# Patient Record
Sex: MALE | URBAN - METROPOLITAN AREA
[De-identification: names, ages, dates, MRNs, and addresses within clinical notes are randomized per-mention and may not be internally consistent; named-entity substitution may affect disease eponyms.]

---

## 2024-06-26 ENCOUNTER — TELEPHONE (OUTPATIENT)
Dept: HEMATOLOGY/ONCOLOGY | Facility: CLINIC | Age: 69
End: 2024-06-26

## 2024-06-26 NOTE — TELEPHONE ENCOUNTER
Received outside referral on 6/25, faxed on 6/21. Nurse contacted pt on 6/26, explained role in scheduling new pt appt with next available provider. Offered the first next available. Pt voiced having upcoming appts for scans and chemo. Nurse voiced understanding. Scheduled for 7/8 1030 with Darwin @ . Provided appt date, time, location, provider, and address. Informed pt to bring ID and insurance info. Pt verbalized understanding and confirmed appt. All questions and concerns addressed. Nurse provided clinic number if pt has any questions, concerns or needs rescheduling. Pt will need assistance setting up [facility].

## 2024-07-08 ENCOUNTER — OFFICE VISIT (OUTPATIENT)
Dept: HEMATOLOGY/ONCOLOGY | Facility: CLINIC | Age: 69
End: 2024-07-08
Payer: MEDICARE

## 2024-07-08 VITALS
HEART RATE: 103 BPM | BODY MASS INDEX: 25.02 KG/M2 | WEIGHT: 184.75 LBS | RESPIRATION RATE: 20 BRPM | HEIGHT: 72 IN | TEMPERATURE: 99 F | SYSTOLIC BLOOD PRESSURE: 128 MMHG | OXYGEN SATURATION: 97 % | DIASTOLIC BLOOD PRESSURE: 82 MMHG

## 2024-07-08 DIAGNOSIS — C79.51 SECONDARY MALIGNANT NEOPLASM OF BONE: ICD-10-CM

## 2024-07-08 DIAGNOSIS — C34.32 ADENOCARCINOMA OF LOWER LOBE OF LEFT LUNG: Primary | ICD-10-CM

## 2024-07-08 DIAGNOSIS — M34.9 SCLERODERMA: ICD-10-CM

## 2024-07-08 DIAGNOSIS — C77.1 SECONDARY MALIGNANT NEOPLASM OF INTRATHORACIC LYMPH NODES: ICD-10-CM

## 2024-07-08 PROCEDURE — 99205 OFFICE O/P NEW HI 60 MIN: CPT | Mod: S$PBB,,, | Performed by: HOSPITALIST

## 2024-07-08 PROCEDURE — 99999 PR PBB SHADOW E&M-EST. PATIENT-LVL IV: CPT | Mod: PBBFAC,,, | Performed by: HOSPITALIST

## 2024-07-08 PROCEDURE — 99214 OFFICE O/P EST MOD 30 MIN: CPT | Mod: PBBFAC | Performed by: HOSPITALIST

## 2024-07-08 RX ORDER — CEPHALEXIN 250 MG
180 CAPSULE ORAL
COMMUNITY

## 2024-07-08 RX ORDER — FERROUS SULFATE 325(65) MG
325 TABLET ORAL
COMMUNITY

## 2024-07-08 RX ORDER — SILDENAFIL CITRATE 20 MG/1
1 TABLET ORAL 3 TIMES DAILY
COMMUNITY

## 2024-07-08 RX ORDER — AMLODIPINE BESYLATE 5 MG/1
1 TABLET ORAL DAILY
COMMUNITY
Start: 2023-11-21

## 2024-07-08 RX ORDER — LISINOPRIL 10 MG/1
2 TABLET ORAL DAILY
COMMUNITY

## 2024-07-08 RX ORDER — MUPIROCIN 20 MG/G
1 OINTMENT TOPICAL
COMMUNITY

## 2024-07-08 RX ORDER — ONDANSETRON HYDROCHLORIDE 8 MG/1
8 TABLET, FILM COATED ORAL EVERY 8 HOURS PRN
COMMUNITY
Start: 2024-06-26 | End: 2024-09-24

## 2024-07-08 RX ORDER — ATORVASTATIN CALCIUM 40 MG/1
1 TABLET, FILM COATED ORAL DAILY
COMMUNITY

## 2024-07-08 RX ORDER — PHENAZOPYRIDINE HYDROCHLORIDE 200 MG/1
200 TABLET, FILM COATED ORAL 3 TIMES DAILY PRN
COMMUNITY

## 2024-07-08 RX ORDER — CHOLECALCIFEROL (VITAMIN D3) 25 MCG
TABLET ORAL DAILY
COMMUNITY

## 2024-07-08 RX ORDER — PREDNISONE
7.5 POWDER (GRAM) MISCELLANEOUS
COMMUNITY

## 2024-07-08 RX ORDER — HYDROCODONE BITARTRATE AND ACETAMINOPHEN 5; 325 MG/1; MG/1
1 TABLET ORAL EVERY 6 HOURS PRN
COMMUNITY

## 2024-07-08 RX ORDER — FLUTICASONE PROPIONATE 50 MCG
1 SPRAY, SUSPENSION (ML) NASAL DAILY
COMMUNITY

## 2024-07-08 RX ORDER — DICLOFENAC SODIUM 25 MG/1
25 TABLET, DELAYED RELEASE ORAL
COMMUNITY

## 2024-07-08 RX ORDER — RALOXIFENE HYDROCHLORIDE 60 MG/1
1 TABLET, FILM COATED ORAL DAILY
COMMUNITY

## 2024-07-08 RX ORDER — GLUCOSAM/CHONDRO/HERB 149/HYAL 750-100 MG
1000 TABLET ORAL
COMMUNITY

## 2024-07-08 RX ORDER — FOLIC ACID 100 %
666 POWDER (GRAM) MISCELLANEOUS
COMMUNITY

## 2024-07-08 RX ORDER — TRAMADOL HYDROCHLORIDE 50 MG/1
1 TABLET ORAL EVERY 6 HOURS PRN
COMMUNITY

## 2024-07-08 RX ORDER — VITAMIN E MIXED 400 UNIT
1 CAPSULE ORAL EVERY MORNING
COMMUNITY

## 2024-07-08 NOTE — PROGRESS NOTES
[facility] MEDICAL ONCOLOGY - NEW PATIENT VISIT    Reason for visit: Second opinion, Stage IV Adenocarcinoma of the Lung      Oncology History   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   5/15/2024 Imaging Significant Findings    CT C, Non-Con (f/u after imaging for scleroderma clinical trial workup)  - 2.5 x 2.4 x 1.8 cm LLL nodule (previously 2.2 x 2.2 x 1.8 cm, 3/25/24)  - Patchy ground-glass and nodular opacities adjacent to LLL nodule  - 1.4 cm left hilar lymph node  - Right lower paratracheal lymphadenopathy  - Lytic lesion with erosive changes in T2 vertebral body, new lytic lesions in manubrium, right fourth rib, T3, T7, and T11 vertebral bodies with pathologic fracture of superior endplate of T3 vertebral body     5/15/2024 Procedure    Bronch w/ EBUS  LLL TBBx  - Adenocarcinoma (Positive: TTF-1; Negative: p40)    LLL, Core Biopsy  - Adenocarcinoma    LN  - Positive: Station 11L  - Negative: Station 4R, 7, 4L    Caris NGS / PD-L1      - KEAP1, TP53, STK11 mutated  - ERBB2 negative / 1+  - TMB 7  - TORIE       6/5/2024 Imaging Significant Findings    PET CT  - 2.2 cm LLL mass, SUV 4.8  - 1.6 cm left hilar node, SUV 6.8  - 1.3 cm right lower paratracheal node, SUV 2.2  - Hypermetabolic lytic lesions in multiple bones (C7 transverse process, multiple thoracic, lumbar, and sacral segments, sternum, right scapular body, left scapular angle, several ribs, left ilium, left pubic body, bilateral ischia)     7/5/2024 Imaging Significant Findings    MRI Brain  - LANNY in brain or dura  - Mild enhancement within clivus, cannot rule out bony met     7/8/2024 Initial Diagnosis    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        Previous Workup:   - 6/5/24:  Medical oncology (Dr. Erik Clay), discussed carboplatin/paclitaxel, plan to proceed  - 6/5/24: C1D1 carboplatin/paclitaxel  - 6/26/24: C2D1 carboplatin/paclitaxel        HPI:     Ernie Walton is a 68 y.o. 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     Pt states that his chemotherapy is "making me weak". He describes chiefly fatigue. He describes stable SOB which he ascribes to his scleroderma (still able to walk the entirety of a [employer]). He is taking mucinex nightly for sinus and nighttime cough, but denies a daytime cough. He is able to complete all ADLs and iADLs without assistance; he notes that he has stopped weed eating since starting chemotherapy (has 4 acres).     Living Situation: He lives in [place], [place] with his wife, daughter, grandchild, and 96 year old mother in law. He does not have stairs in his house.     Tobacco Use: He smoked from age 19 until age 64. He smoked 1 pack daily at his heaviest.     EtOH Use: None.     Employment / Exposure History: He is retired. 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     Family Cancer History: A niece had leukemia, but otherwise denies a family history of cancer.     History has been obtained by chart review and discussion with the patient.      ROS:   As per HPI.       Physical Exam:       /82   Pulse 103   Temp 98.9 °F (37.2 °C) (Temporal)   Resp 20   Ht 6' (1.829 m)   Wt 83.8 kg (184 lb 11.9 oz)   SpO2 97%   BMI 25.06 kg/m²                Physical Exam      Labs:   No results found for this or any previous " visit (from the past 48 hour(s)).     Imaging:    See oncologic history above.     Path:  See oncologic history above.      Assessment and Plan:     Ernie Walton is a 68 y.o. male with pmh significant for systemic sclerosis/scleroderma (dx'd 2020, on mycophenolate mofetil, prednisone, and nintendinab), HTN, HLD, BPH, h/o nephrolithiasis w/ recurrent UTI, DDD, and Stage IVB ([id number]) adenocarcinoma of the LLL w/ diffuse osseous disease (PD-L1 TPS 1%, no targetable mutations), initially dx'd 5/15/24, currently on carboplatin/paclitaxel (6/5/24 - present), who presents for a second opinion.     Stage IVB Adenocarcinoma of the LLL. PD-L1 1%, No Targetable Mutations  ECOG PS 1.  Patient presents today as a second opinion.  Oncologic history as above, however briefly, patient was being evaluated for clinical trial for scleroderma when chest imaging demonstrated a LLL nodule.  Bronchoscopy revealed an adenocarcinoma, and a PET-CT unfortunately demonstrated multiple hypermetabolic lytic lesions throughout the axial skeleton.  NGS revealed no targetable mutations though did demonstrate a TP53, KEAP1, and STK11 mutation.  PD-L1 is 1%.  I discussed the patient's diagnosis, reviewed the reports of his imaging (images not available), and discussed his stage.  We discussed that treatment for stage IV lung cancer is systemic in nature and palliative in intent.  We discussed that, in the absence of targetable mutations and in the presence of actively treated scleroderma, treatment options are limited to chemotherapy alone.  He is currently on carboplatin/paclitaxel (may have also considered carboplatin/pemetrexed for maintenance per the PARAMOUNT trial), now mid-C2 and tolerating relatively well (c/b fatigue only).  At his request, we discussed second-line therapy options, including standard of care docetaxel +/- ramucirumab, clinical trial (noting that his scleroderma may disqualify him from this), or even rechallenge with platinum  based on time from last dose to progression.  The pt endorsed his understanding, and was provided with a written summary of the discussion.  He states that he would like to follow at [facility] after completion of his 6 planned cycles of carboplatin/paclitaxel.  Patient will reach out during cycle 5 or cycle 6 to arrange follow up.    PLAN:   -- Agree with cytotoxic therapy alone (currently on carboplatin/paclitaxel)  -- Mid-treatment imaging (including MRI spine) already scheduled for 8/9/24  -- Will request outside scans be uploaded into chart  -- Pt prefers f/u at [facility] as he lives in [place], [place], will reach out towards end of chemotherapy to arrange      Bone Mets  Patient with multiple hypermetabolic lytic lesions throughout the axial skeleton.  These have not been biopsy-proven.  Favor that this represents the non-small-cell lung cancer, however notably there is a relatively low burden of pulmonary disease and so will confirm that PSA testing has been previously sent noting patient has history of BPH. Pt states he is on a q6m injection for bone strength.   -- Agree with osteoclast inhibition, recommend zometa q12w or denosumab q4w  -- Message sent to navigation regarding PSA results      Scleroderma  Currently under the care of her rheumatologist in [place], on mycophenolate mofetil and prednisone.  Active treatment precludes safe utilization of immunotherapy.      The above information has been reviewed with the patient and all questions have been answered to their apparent satisfaction.  They understand that they can call the clinic with any questions.    Rudi Granados MD  Hematology/Oncology  [facility]      Route Chart for Scheduling    Med Onc Chart Routing      Follow up with physician . Pt will reach out to schedule f/u.   Follow up with ADRIANNA    Infusion scheduling note    Injection scheduling note    Labs    Imaging    Pharmacy appointment    Other referrals                       Disclaimer: This note was prepared using voice recognition system and is likely to have sound alike errors.  Please call me with any questions.

## 2024-07-08 NOTE — Clinical Note
Hi!  This patient came for a second opinion. Couple of things:   1) Possible to get the actual images of the PET, CT, and MRI brain uploaded into our system?   2) Can you see if he's every had a PSA drawn? I don't see it in the Care Everywhere, but may be missing it!  3) Is there a way to route the note to his primary oncologist? I have done this before and it usually generates a fax, just wanted to make sure that's the best way to do this?   Thanks! Lasha

## 2024-07-10 DIAGNOSIS — N40.0 BENIGN PROSTATIC HYPERPLASIA, UNSPECIFIED WHETHER LOWER URINARY TRACT SYMPTOMS PRESENT: Primary | ICD-10-CM

## 2024-07-12 ENCOUNTER — LAB VISIT (OUTPATIENT)
Dept: LAB | Facility: HOSPITAL | Age: 69
End: 2024-07-12
Attending: HOSPITALIST
Payer: MEDICARE

## 2024-07-12 DIAGNOSIS — N40.0 BENIGN PROSTATIC HYPERPLASIA, UNSPECIFIED WHETHER LOWER URINARY TRACT SYMPTOMS PRESENT: ICD-10-CM

## 2024-07-12 PROCEDURE — 84153 ASSAY OF PSA TOTAL: CPT | Performed by: HOSPITALIST

## 2024-07-12 PROCEDURE — 36415 COLL VENOUS BLD VENIPUNCTURE: CPT | Mod: PO | Performed by: HOSPITALIST

## 2024-07-13 LAB — COMPLEXED PSA SERPL-MCNC: 5.7 NG/ML (ref 0–4)

## 2024-08-21 ENCOUNTER — PATIENT MESSAGE (OUTPATIENT)
Dept: HEMATOLOGY/ONCOLOGY | Facility: CLINIC | Age: 69
End: 2024-08-21
Payer: MEDICARE

## 2024-09-23 ENCOUNTER — PATIENT MESSAGE (OUTPATIENT)
Dept: HEMATOLOGY/ONCOLOGY | Facility: CLINIC | Age: 69
End: 2024-09-23
Payer: MEDICARE

## 2024-10-07 ENCOUNTER — PATIENT MESSAGE (OUTPATIENT)
Dept: HEMATOLOGY/ONCOLOGY | Facility: CLINIC | Age: 69
End: 2024-10-07
Payer: MEDICARE

## 2024-10-28 ENCOUNTER — TELEPHONE (OUTPATIENT)
Dept: HEMATOLOGY/ONCOLOGY | Facility: CLINIC | Age: 69
End: 2024-10-28
Payer: MEDICARE

## 2024-11-11 ENCOUNTER — OFFICE VISIT (OUTPATIENT)
Dept: HEMATOLOGY/ONCOLOGY | Facility: CLINIC | Age: 69
End: 2024-11-11
Payer: MEDICARE

## 2024-11-11 VITALS
TEMPERATURE: 98 F | WEIGHT: 186.94 LBS | HEART RATE: 109 BPM | SYSTOLIC BLOOD PRESSURE: 129 MMHG | HEIGHT: 71 IN | BODY MASS INDEX: 26.17 KG/M2 | DIASTOLIC BLOOD PRESSURE: 73 MMHG

## 2024-11-11 DIAGNOSIS — C34.32 ADENOCARCINOMA OF LOWER LOBE OF LEFT LUNG: Primary | ICD-10-CM

## 2024-11-11 DIAGNOSIS — M34.9 SCLERODERMA: ICD-10-CM

## 2024-11-11 DIAGNOSIS — G62.0 CHEMOTHERAPY-INDUCED PERIPHERAL NEUROPATHY: ICD-10-CM

## 2024-11-11 DIAGNOSIS — C79.51 SECONDARY MALIGNANT NEOPLASM OF BONE: ICD-10-CM

## 2024-11-11 DIAGNOSIS — T45.1X5A CHEMOTHERAPY-INDUCED PERIPHERAL NEUROPATHY: ICD-10-CM

## 2024-11-11 DIAGNOSIS — C77.1 SECONDARY MALIGNANT NEOPLASM OF INTRATHORACIC LYMPH NODES: ICD-10-CM

## 2024-11-11 PROCEDURE — 99999 PR PBB SHADOW E&M-EST. PATIENT-LVL IV: CPT | Mod: PBBFAC,,, | Performed by: HOSPITALIST

## 2024-11-11 PROCEDURE — G2211 COMPLEX E/M VISIT ADD ON: HCPCS | Mod: S$PBB,,, | Performed by: HOSPITALIST

## 2024-11-11 PROCEDURE — 99215 OFFICE O/P EST HI 40 MIN: CPT | Mod: S$PBB,,, | Performed by: HOSPITALIST

## 2024-11-11 PROCEDURE — 99214 OFFICE O/P EST MOD 30 MIN: CPT | Mod: PBBFAC | Performed by: HOSPITALIST

## 2024-11-11 RX ORDER — PANTOPRAZOLE SODIUM 40 MG/1
40 TABLET, DELAYED RELEASE ORAL
COMMUNITY

## 2024-11-11 RX ORDER — TAMSULOSIN HYDROCHLORIDE 0.4 MG/1
1 CAPSULE ORAL DAILY
COMMUNITY

## 2024-11-11 RX ORDER — PREGABALIN 75 MG/1
75 CAPSULE ORAL 2 TIMES DAILY
COMMUNITY
Start: 2024-10-30

## 2024-11-11 RX ORDER — SENNOSIDES 25 MG/1
TABLET, FILM COATED ORAL
COMMUNITY
Start: 2024-10-30

## 2024-11-11 RX ORDER — ONDANSETRON 4 MG/1
4 TABLET, ORALLY DISINTEGRATING ORAL
COMMUNITY
Start: 2024-10-30

## 2024-11-11 NOTE — Clinical Note
Hi!  Can we get the Pet scan from 9/27/24 at [facility] for review? Also, the most recent notes from 9/17/24 from Dr. Erik Clay?

## 2024-11-11 NOTE — PROGRESS NOTES
[facility] MEDICAL ONCOLOGY - FOLLOW UP VISIT    Reason for visit: Second opinion, Stage IV Adenocarcinoma of the Lung      Oncology History   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   5/15/2024 Imaging Significant Findings    CT C, Non-Con (f/u after imaging for scleroderma clinical trial workup)  - 2.5 x 2.4 x 1.8 cm LLL nodule (previously 2.2 x 2.2 x 1.8 cm, 3/25/24)  - Patchy ground-glass and nodular opacities adjacent to LLL nodule  - 1.4 cm left hilar lymph node  - Right lower paratracheal lymphadenopathy  - Lytic lesion with erosive changes in T2 vertebral body, new lytic lesions in manubrium, right fourth rib, T3, T7, and T11 vertebral bodies with pathologic fracture of superior endplate of T3 vertebral body     5/15/2024 Procedure    Bronch w/ EBUS  LLL TBBx  - Adenocarcinoma (Positive: TTF-1; Negative: p40)    LLL, Core Biopsy  - Adenocarcinoma    LN  - Positive: Station 11L  - Negative: Station 4R, 7, 4L    Caris NGS / PD-L1      - KEAP1, TP53, STK11 mutated  - ERBB2 negative / 1+  - TMB 7  - TORIE       6/5/2024 Imaging Significant Findings    PET CT  - 2.2 cm LLL mass, SUV 4.8  - 1.6 cm left hilar node, SUV 6.8  - 1.3 cm right lower paratracheal node, SUV 2.2  - Hypermetabolic lytic lesions in multiple bones (C7 transverse process, multiple thoracic, lumbar, and sacral segments, sternum, right scapular body, left scapular angle, several ribs, left ilium, left pubic body, bilateral ischia)     7/5/2024 Imaging Significant Findings    MRI Brain  - LANNY in brain or dura  - Mild enhancement within clivus, cannot rule out bony met     7/8/2024 Initial Diagnosis    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     7/8/2024 Cancer Staged    Staging form: Lung, AJCC 8th Edition  - Clinical: Stage IVB (cT2, cN1, pM1c)     9/27/2024 Imaging Significant Findings    PET CT (compared against PET CT from 6/5/24)  1.4 x 1.0 cm LLL nodule (previously 2.0 x 1.3 cm), SUV 6.8  (similar)  New fairly defined opacity adjacent to the primary mass measuring up 1.8. The activity overlying the primary tumor is now confluent with the new opacity, leading to increased extent of activity.   1.2 x 2.0 cm L hilar LN (previously 1.4 x 2.2 cm), SUV 5.2  Numerous FDG-avid bone metastases are noted. Although previously visualized lesions show some areas of improvement, there are multiple new active bone metastases. The pattern suggests initial response to treatment with subsequent progressive disease         Previous Workup:   - 6/5/24:  Medical oncology (Dr. Erik Clay), discussed carboplatin/paclitaxel, plan to proceed  - 6/5/24: C1D1 carboplatin/paclitaxel  - 6/26/24: C2D1 carboplatin/paclitaxel    Oncology History    HPI:     Ernie Walton is a 69 y.o. male with pmh significant for systemic sclerosis/scleroderma (dx'd 2020, on mycophenolate mofetil, prednisone, and nintendinab), HTN, HLD, BPH, h/o nephrolithiasis w/ recurrent UTI, and DDD who presents for f/u of the below lung cancer. His primary oncologist has been Dr. Erik Clay, and he has established care with Dr. Holley ([facility]).    1)  Stage IVB ([id number]) adenocarcinoma of the LLL (PD-L1 TPS 1%, no targetable mutations, STK11/TP53/KEAP1 co-mutations) w/ diffuse osseous disease , initially dx'd 5/15/24, s/p carboplatin/paclitaxel (6/5/24 - 9/17/24), and on supportive denosumab (last dose 9/17/24)    Last clinic 7/8/24, new patient visit, already on carboplatin/paclitaxel and tolerating relatively well.  Patient would like to follow up after completion of 6 planned cycles of carboplatin/paclitaxel.  Outside scans requested.  Patient prefers follow up at [facility].  Recommend Zometa Q 12 weeks or denosumab q.4 weeks.    Interval History:  9/17/24: C6 carboplatin/paclitaxel  9/17/24: Last bone strengthening medicine was on 9/17/24 9/17/24: Last visit with oncology  9/23/24: [facility] initial visit (Dr. Holley), plan for PET CT. Recommend to continue  "pemetrexed maintenance. RTC in 3 months. Continue either zometa or denosumab.   9/27/24: PET CT, as above    The pt states that he has been feeling unwell since his PET scan on 9/27/24, which he relates to the "radioactive contrast". He says that he developed nausea/vomiting, and says that he was in bed for a week afterwards. He says that his peripheral neuropathy has significantly worsened since then. He says this is worse in his feet. He says they are cold sensitive. He describes pins and needles with walking. He says that he has been working with a podiatrist. He has since started on pregabalin and lidocaine cream. He feels that his energy is quite low.     He feels that all of his symptoms are related to his FDG contrast.       ROS:   As per HPI.       Physical Exam:       /73   Pulse 109   Temp 97.9 °F (36.6 °C) (Temporal)   Ht 5' 11" (1.803 m)   Wt 84.8 kg (186 lb 15.2 oz)   BMI 26.07 kg/m²                Physical Exam      Labs:   No results found for this or any previous visit (from the past 48 hours).     Imaging:    See oncologic history above.     Path:  See oncologic history above.      Assessment and Plan:     Ernie Walton is a 69 y.o. male with pmh significant for systemic sclerosis/scleroderma (dx'd 2020, on mycophenolate mofetil, prednisone, and nintendinab), HTN, HLD, BPH, h/o nephrolithiasis w/ recurrent UTI, and DDD who presents for f/u of the below lung cancer. His primary oncologist has been Dr. Erik Clay, and he has established care with Dr. Holley ([facility]).    1)  Stage IVB ([id number]) adenocarcinoma of the LLL (PD-L1 TPS 1%, no targetable mutations, STK11/TP53/KEAP1 co-mutations) w/ diffuse osseous disease , initially dx'd 5/15/24, s/p carboplatin/paclitaxel (6/5/24 - 9/17/24), and on supportive denosumab (last dose 9/17/24)    Stage IVB Adenocarcinoma of the LLL. PD-L1 1%, No Targetable Mutations  ECOG PS 1.  Patient presents for follow up.  Since last visit, he has completed his " "sixth and final cycle of carboplatin/paclitaxel (9/17/24), noting this is also in his last date of receipt of denosumab.  He establish care with [facility], where recommendation was to "continue pemetrexed maintenance"; of note, pt has not received pemetrexed, though switch maintenance dosing is a viable option as previously discussed with the pt (see note from 7/8/24 for considerations).  He received a PET-CT on 9/27/24 following completion of chemotherapy (on discussion with Dr. Clay's staff, it does not appear that he received mid-treatment imaging); this scan demonstrated decrement in the size of the primary LLL mass but with a new adjacent opacity, mild decrement in size of the left hilar lymph node, and mixed response in the bones (some areas of improvement with multiple new active bone metastases).  Per the patient, this result has not been discussed with him previously.  He also denies any further follow up at either Dr. Clay's or Dr. Holley's offices.     Today (11/11/24), we discussed that these images are concerning for disease progression despite recent cytotoxic chemotherapy.  Should this be the case, patient would be to transition to immunotherapy-sparing second-line therapy. As has been previously discussed with him, this would include either standard of care docetaxel/ramucirumab versus possibly clinical trial (noting that his actively treated scleroderma/systemic sclerosis may disqualify him for clinical trials). Should there not be clear evidence of progression (less favored based on radiology report), may consider switch maintenance with pemetrexed.     Of note, confirmed by telephone that pt did not receive imaging mid-treatment and was never given pemetrexed.     PLAN:   Obtain PET images from 9/27/24 for review  RTC on 11/25/24 to discuss the above, anticipating need to start new treatment (and new baseline imaging prior to this) pending the above      Bone Mets  Pt w/ diffuse osseous disease " involving the C/T/L/S spine, sternum, R scapular body, L scapular angle, several ribs, L ilium, L pubic body, and b/l ischia. Pt was previously on xgeva, last dose on 9/17/24. Given distance from [facility], will  transition to zometa q12w, noting pt is overdue by ~2 months.   Zometa ordered, will schedule w/ f/u      Peripheral Neuropathy  See HPI for description. Given timeline, most consistent with chemotherapy induced PN. Pt currently on pregabalin to minimal effect. Will transition to duloxetine.   Discuss pregabalin taper over 1 week and duloxetine start at next visit      Scleroderma  Currently under the care of her rheumatologist in [place], on mycophenolate mofetil and prednisone.  Active treatment precludes safe utilization of immunotherapy.      The above information has been reviewed with the patient and all questions have been answered to their apparent satisfaction.  They understand that they can call the clinic with any questions.    Rudi Granados MD  Hematology/Oncology  [facility]      Route Chart for Scheduling    Med Onc Chart Routing      Follow up with physician . RTC on 11/25/24, please arrange dose of zometa same day   Follow up with ADRIANNA    Infusion scheduling note    Injection scheduling note    Labs    Imaging    Pharmacy appointment    Other referrals                  Disclaimer: This note was prepared using voice recognition system and is likely to have sound alike errors.  Please call me with any questions.

## 2024-11-12 ENCOUNTER — PATIENT MESSAGE (OUTPATIENT)
Dept: HEMATOLOGY/ONCOLOGY | Facility: CLINIC | Age: 69
End: 2024-11-12
Payer: MEDICARE

## 2024-11-25 ENCOUNTER — OFFICE VISIT (OUTPATIENT)
Dept: HEMATOLOGY/ONCOLOGY | Facility: CLINIC | Age: 69
End: 2024-11-25
Payer: MEDICARE

## 2024-11-25 ENCOUNTER — INFUSION (OUTPATIENT)
Dept: INFUSION THERAPY | Facility: HOSPITAL | Age: 69
End: 2024-11-25
Attending: HOSPITALIST
Payer: MEDICARE

## 2024-11-25 ENCOUNTER — HOSPITAL ENCOUNTER (OUTPATIENT)
Dept: RADIOLOGY | Facility: HOSPITAL | Age: 69
Discharge: HOME | End: 2024-11-25
Attending: HOSPITALIST
Payer: MEDICARE

## 2024-11-25 ENCOUNTER — PATIENT MESSAGE (OUTPATIENT)
Dept: HEMATOLOGY/ONCOLOGY | Facility: CLINIC | Age: 69
End: 2024-11-25

## 2024-11-25 VITALS
HEART RATE: 76 BPM | RESPIRATION RATE: 18 BRPM | TEMPERATURE: 98 F | DIASTOLIC BLOOD PRESSURE: 76 MMHG | SYSTOLIC BLOOD PRESSURE: 132 MMHG | OXYGEN SATURATION: 98 %

## 2024-11-25 DIAGNOSIS — C34.32 ADENOCARCINOMA OF LOWER LOBE OF LEFT LUNG: ICD-10-CM

## 2024-11-25 DIAGNOSIS — G62.0 CHEMOTHERAPY-INDUCED PERIPHERAL NEUROPATHY: ICD-10-CM

## 2024-11-25 DIAGNOSIS — C34.32 ADENOCARCINOMA OF LOWER LOBE OF LEFT LUNG: Primary | ICD-10-CM

## 2024-11-25 DIAGNOSIS — D63.0 ANEMIA IN NEOPLASTIC DISEASE: ICD-10-CM

## 2024-11-25 DIAGNOSIS — C79.51 SECONDARY MALIGNANT NEOPLASM OF BONE: ICD-10-CM

## 2024-11-25 DIAGNOSIS — M34.9 SCLERODERMA: ICD-10-CM

## 2024-11-25 DIAGNOSIS — T45.1X5A CHEMOTHERAPY-INDUCED PERIPHERAL NEUROPATHY: ICD-10-CM

## 2024-11-25 DIAGNOSIS — C77.1 SECONDARY MALIGNANT NEOPLASM OF INTRATHORACIC LYMPH NODES: ICD-10-CM

## 2024-11-25 PROCEDURE — 71260 CT THORAX DX C+: CPT | Mod: TC

## 2024-11-25 PROCEDURE — 25500020 PHARM REV CODE 255: Performed by: HOSPITALIST

## 2024-11-25 PROCEDURE — 99999 PR PBB SHADOW E&M-EST. PATIENT-LVL II: CPT | Mod: PBBFAC,,, | Performed by: HOSPITALIST

## 2024-11-25 PROCEDURE — 99212 OFFICE O/P EST SF 10 MIN: CPT | Mod: PBBFAC,25 | Performed by: HOSPITALIST

## 2024-11-25 PROCEDURE — G2211 COMPLEX E/M VISIT ADD ON: HCPCS | Mod: S$PBB,,, | Performed by: HOSPITALIST

## 2024-11-25 PROCEDURE — 99215 OFFICE O/P EST HI 40 MIN: CPT | Mod: S$PBB,,, | Performed by: HOSPITALIST

## 2024-11-25 PROCEDURE — 74177 CT ABD & PELVIS W/CONTRAST: CPT | Mod: 26,,, | Performed by: RADIOLOGY

## 2024-11-25 PROCEDURE — 71260 CT THORAX DX C+: CPT | Mod: 26,,, | Performed by: RADIOLOGY

## 2024-11-25 RX ORDER — DULOXETIN HYDROCHLORIDE 30 MG/1
CAPSULE, DELAYED RELEASE ORAL
Qty: 67 CAPSULE | Refills: 0 | Status: SHIPPED | OUTPATIENT
Start: 2024-11-25 | End: 2025-01-01

## 2024-11-25 RX ADMIN — IOHEXOL 100 ML: 350 INJECTION, SOLUTION INTRAVENOUS at 01:11

## 2024-11-25 RX ADMIN — IOHEXOL 30 ML: 350 INJECTION, SOLUTION INTRAVENOUS at 11:11

## 2024-11-25 NOTE — PROGRESS NOTES
[facility] MEDICAL ONCOLOGY - FOLLOW UP VISIT    Reason for visit: Second opinion, Stage IV Adenocarcinoma of the Lung      Oncology History   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   5/15/2024 Imaging Significant Findings    CT C, Non-Con (f/u after imaging for scleroderma clinical trial workup)  - 2.5 x 2.4 x 1.8 cm LLL nodule (previously 2.2 x 2.2 x 1.8 cm, 3/25/24)  - Patchy ground-glass and nodular opacities adjacent to LLL nodule  - 1.4 cm left hilar lymph node  - Right lower paratracheal lymphadenopathy  - Lytic lesion with erosive changes in T2 vertebral body, new lytic lesions in manubrium, right fourth rib, T3, T7, and T11 vertebral bodies with pathologic fracture of superior endplate of T3 vertebral body     5/15/2024 Procedure    Bronch w/ EBUS  LLL TBBx  - Adenocarcinoma (Positive: TTF-1; Negative: p40)    LLL, Core Biopsy  - Adenocarcinoma    LN  - Positive: Station 11L  - Negative: Station 4R, 7, 4L    Caris NGS / PD-L1      - KEAP1, TP53, STK11 mutated  - ERBB2 negative / 1+  - TMB 7  - TORIE       6/5/2024 Imaging Significant Findings    PET CT  - 2.2 cm LLL mass, SUV 4.8  - 1.6 cm left hilar node, SUV 6.8  - 1.3 cm right lower paratracheal node, SUV 2.2  - Hypermetabolic lytic lesions in multiple bones (C7 transverse process, multiple thoracic, lumbar, and sacral segments, sternum, right scapular body, left scapular angle, several ribs, left ilium, left pubic body, bilateral ischia)     7/5/2024 Imaging Significant Findings    MRI Brain  - LANNY in brain or dura  - Mild enhancement within clivus, cannot rule out bony met     7/8/2024 Initial Diagnosis    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     7/8/2024 Cancer Staged    Staging form: Lung, AJCC 8th Edition  - Clinical: Stage IVB (cT2, cN1, pM1c)     8/9/2024 Imaging Significant Findings    MRI C/T/L Spine  Diffuse osseous metastasis throughout the visualized spine without any evidence of significant  compression fracture or retropulsion with involvement of the vertebral bodies and posterior elements at multiple levels.   No evidence of cervical/thoracic spinal cord compression with impingement of the ventral aspect of the cord from adjacent disc disease at multiple levels as discussed above.   Discal disease with foraminal stenosis at multiple levels throughout the spine with most prominent involvement seen at L4-5 and L5-S1 levels mostly degenerative in nature.   No abnormal intraspinal enhancement with no epidural enhancement noted        9/27/2024 Imaging Significant Findings    PET CT (compared against PET CT from 6/5/24)  1.4 x 1.0 cm LLL nodule (previously 2.0 x 1.3 cm), SUV 6.8 (similar)  New fairly defined opacity adjacent to the primary mass measuring up 1.8. The activity overlying the primary tumor is now confluent with the new opacity, leading to increased extent of activity.   1.2 x 2.0 cm L hilar LN (previously 1.4 x 2.2 cm), SUV 5.2  Numerous FDG-avid bone metastases are noted. Although previously visualized lesions show some areas of improvement, there are multiple new active bone metastases (L4, T8). The pattern suggests initial response to treatment with subsequent progressive disease         Previous Workup:   - 6/5/24:  Medical oncology (Dr. Erik Clay), discussed carboplatin/paclitaxel, plan to proceed  - 6/5/24: C1D1 carboplatin/paclitaxel  - 6/26/24: C2D1 carboplatin/paclitaxel    Oncology History    HPI:     Ernie Walton is a 69 y.o. male with pmh significant for systemic sclerosis/scleroderma (dx'd 2020, on mycophenolate mofetil, prednisone, and nintendinab), HTN, HLD, BPH, h/o nephrolithiasis w/ recurrent UTI, and DDD who presents for f/u of the below lung cancer. His primary oncologist has been Dr. Erik Clay, and he has established care with Dr. Holley ([facility]).    1)  Stage IVB (H2I4G3f) adenocarcinoma of the LLL (PD-L1 TPS 1%, no targetable mutations, STK11/TP53/KEAP1 co-mutations)  w/ diffuse osseous disease , initially dx'd 5/15/24, s/p carboplatin/paclitaxel (6/5/24 - 9/17/24), and on supportive denosumab (last dose 9/17/24)    Last clinic 11/11/24, obtain PET images from 9/27/24 for review, RTC on 11/25 to discuss, anticipating need to start new treatment.  Zometa ordered, schedule a follow up.  Discuss pregabalin taper over 1 week and duloxetine started next visit.    Interval History:  PET images from 9/27/24 uploaded    The patient denies any new or worsening symptoms today.  He presents today with his sister.    ROS:   As per HPI.       Physical Exam:       There were no vitals taken for this visit.               Physical Exam  Constitutional:       Appearance: Normal appearance.   HENT:      Head: Normocephalic and atraumatic.   Eyes:      Extraocular Movements: Extraocular movements intact.      Conjunctiva/sclera: Conjunctivae normal.      Pupils: Pupils are equal, round, and reactive to light.   Cardiovascular:      Rate and Rhythm: Normal rate and regular rhythm.      Heart sounds: No murmur heard.     No friction rub. No gallop.   Pulmonary:      Effort: Pulmonary effort is normal.      Breath sounds: No wheezing, rhonchi or rales.   Musculoskeletal:         General: Normal range of motion.      Right lower leg: No edema.      Left lower leg: No edema.      Comments: Sclerodactyly evident   Skin:     General: Skin is warm and dry.   Neurological:      Mental Status: He is alert and oriented to person, place, and time.   Psychiatric:         Mood and Affect: Mood normal.         Thought Content: Thought content normal.         Judgment: Judgment normal.           Labs:   No results found for this or any previous visit (from the past 48 hours).     Imaging:    See oncologic history above.     Path:  See oncologic history above.      Assessment and Plan:     Ernie Walton is a 69 y.o. male with pmh significant for systemic sclerosis/scleroderma (dx'd 2020, on mycophenolate mofetil,  prednisone, and nintendinab), HTN, HLD, BPH, h/o nephrolithiasis w/ recurrent UTI, and DDD who presents for f/u of the below lung cancer. His primary oncologist has been Dr. Erik Clay, and he has established care with Dr. Holley ([facility]).    1)  Stage IVB (S1M4R8w) adenocarcinoma of the LLL (PD-L1 TPS 1%, no targetable mutations, STK11/TP53/KEAP1 co-mutations) w/ diffuse osseous disease , initially dx'd 5/15/24, s/p carboplatin/paclitaxel (6/5/24 - 9/17/24), and on supportive denosumab (last dose 9/17/24)    Stage IVB Adenocarcinoma of the LLL. PD-L1 1%, No Targetable Mutations  ECOG PS 1.  Patient presents for follow up.  Since last visit, the PET imaging from 9/27/24 was uploaded.  Today (11/25/24), I reviewed these images as well as those from 6/5/24 with the patient (noting no mid-treatment imaging was obtained, as well as noting that the patient stated nobody had discussed the most recent PET imaging with him previously).  We reviewed that this scan demonstrated decrement in the size of the primary LLL mass but with worsening adjacent peripheral disease, mild decrement in size of the left hilar lymph node, and mixed response in the bones (some areas of improvement with multiple new active bone metastases). Given this, we discussed the need to resume active therapy.  Given his last dose of carboplatin/paclitaxel within the past 3 months, will proceed with second-line treatment in the form of docetaxel/ramucirumab (see note from 11/11/24).  We again reviewed the rationale for foregoing immunotherapy in the setting of actively treated scleroderma, however will discuss with the patient's rheumatologist to confirm inability to receive immunotherapy before treatment start. The pt confirms that he will be transitioning his care to [facility].     PLAN:   Plan to start docetaxel + ramucirumab, will discuss with patient's rheumatologist Dr. Yazan Cordero in advance of the ramucirumab (noting scelorderma) and regarding  immunotherapy in general  Baseline CBC and CMP today (11/25/24) notable for hgb 9.1 (see below)  New baseline CT C/A/P today (11/25/24). Pt states that he is NOT allergic to iodinated contrast, but rather felt poorly after FDG contrast  Pt provided handouts on docetaxel and ramucirumab, will also plan for a chemotherapy education session in advance of treatment start  Will enroll in [facility]  at next visit  Labs and RTC w/ C1D1, consent at this visit      Bone Mets  Pt w/ diffuse osseous disease involving the C/T/L/S spine, sternum, R scapular body, L scapular angle, several ribs, L ilium, L pubic body, and b/l ischia. Pt was previously on xgeva, last dose on 9/17/24. On discussion with Dr. Clay's staff, the patient did NOT receive dental clearance in advance of this. The pt confirms this, and notes that he is planning to undergo a dental extraction in 1/2025. Discussed need to hold on further osteoclast inhibition in advance of this, and the pt was provided with a dental clearance form. Upon dental clearance, will transition to zometa q12w due to distance from [place].   Dental clearance pending      Peripheral Neuropathy  See previous note for description. Given timeline, most consistent with chemotherapy induced PN. Pt currently on pregabalin to minimal effect. Will transition to duloxetine.   Decrease to pregabalin daily x4 days, then every other day x4 days, then off  Once pregabalin stopped, start duloxetine (30 mg daily x7 days, then 60 mg daily thereafter)  The above taper was sent to the patient via the patient portal      Anemia  Hgn 9.1 today (11/25/24), from 10 on 9/23/24, from 11/1 on 8/27/24. May still be some residual marrow suppression from cytotoxic therapy (though would anticipate recovery by this time) as well as anemia of chronic inflammation.   Repeat labs w/ next visit      Scleroderma  Currently under the care of her rheumatologist in [place], on mycophenolate mofetil and  prednisone.  Active treatment precludes safe utilization of immunotherapy.  Will contact Dr. Cordero as above in advance of ramucirumab (do not anticipate difficulties with this) and immunotherapy in genera      The above information has been reviewed with the patient and all questions have been answered to their apparent satisfaction.  They understand that they can call the clinic with any questions.    Rudi Granados MD  Hematology/Oncology  [facility]      Route Chart for Scheduling    Med Onc Chart Routing      Follow up with physician . New treatment start, will need chemo class (pt lives far away). Labs (CBC, CMP, UA) and RTC w/ C1D1.   Follow up with ADRIANNA    Infusion scheduling note    Injection scheduling note    Labs CBC, CMP and urinalysis   Scheduling:  Preferred lab:  Lab interval:     Imaging    Pharmacy appointment    Other referrals                      Disclaimer: This note was prepared using voice recognition system and is likely to have sound alike errors.  Please call me with any questions.

## 2024-11-25 NOTE — PLAN OF CARE
Discussed plan of care with pt. Addressed any and ongoing concerns. Pt denies    Problem: Adult Inpatient Plan of Care  Goal: Plan of Care Review  Outcome: Progressing  Flowsheets (Taken 11/25/2024 1333)  Plan of Care Reviewed With: patient  Goal: Absence of Hospital-Acquired Illness or Injury  Outcome: Progressing  Intervention: Identify and Manage Fall Risk  Flowsheets (Taken 11/25/2024 1334)  Safety Promotion/Fall Prevention:   room near unit station   in recliner, wheels locked   nonskid shoes/socks when out of bed  Intervention: Prevent Infection  Flowsheets (Taken 11/25/2024 1334)  Infection Prevention:   hand hygiene promoted   equipment surfaces disinfected  Goal: Optimal Comfort and Wellbeing  Outcome: Progressing  Intervention: Monitor Pain and Promote Comfort  Flowsheets (Taken 11/25/2024 1334)  Pain Management Interventions:   quiet environment facilitated   warm blanket provided   relaxation techniques promoted  Intervention: Provide Person-Centered Care  Flowsheets (Taken 11/25/2024 1334)  Trust Relationship/Rapport:   care explained   reassurance provided   thoughts/feelings acknowledged   choices provided   emotional support provided   empathic listening provided   questions answered   questions encouraged

## 2024-11-30 ENCOUNTER — PATIENT MESSAGE (OUTPATIENT)
Dept: HEMATOLOGY/ONCOLOGY | Facility: CLINIC | Age: 69
End: 2024-11-30
Payer: MEDICARE

## 2024-12-03 ENCOUNTER — TELEPHONE (OUTPATIENT)
Dept: HEMATOLOGY/ONCOLOGY | Facility: CLINIC | Age: 69
End: 2024-12-03
Payer: MEDICARE

## 2024-12-03 NOTE — TELEPHONE ENCOUNTER
"Call placed to pt to discuss/schedule needed appts to begin treatment plan per Dr. Granados; I introduced myself and my roll in pt's care and gave my contact information via [employer] message per pt request. All needed appts were scheduled with pt agreeable. Pt reports he is scheduled for a dental extraction in Jan 2025; per Dr. Granados ok to begin chemo/immunotherapy now, will hold Zometa until post extraction/dental clearance, pt agreed. He denies any further needs at this time though I encouraged him to reach out with any needs/concerns.       Oncology Navigation   Intake      Treatment  Current Status: Active       Medical Oncologist: Darwin  Chemotherapy: Planned  Chemotherapy Regimen: plan change to Docetaxel  Immunotherapy: Planned  Immunotherapy Name: ramucirumab  Start Date: 12/16/24                   Support Systems: Spouse/significant other; Family members  Barriers of Care: Barriers to Care "Assessment completed-no barriers noted"     Acuity      Follow Up  No follow-ups on file.       "

## 2024-12-05 DIAGNOSIS — M79.18 MUSCULOSKELETAL PAIN: Primary | ICD-10-CM

## 2024-12-05 RX ORDER — IBUPROFEN 800 MG/1
800 TABLET ORAL EVERY 8 HOURS PRN
Qty: 60 TABLET | Refills: 2 | Status: SHIPPED | OUTPATIENT
Start: 2024-12-05 | End: 2025-12-05

## 2024-12-11 ENCOUNTER — TELEPHONE (OUTPATIENT)
Dept: HEMATOLOGY/ONCOLOGY | Facility: CLINIC | Age: 69
End: 2024-12-11
Payer: MEDICARE

## 2024-12-11 NOTE — NURSING
HELENA Torres phoned patient to discuss benefits of acupuncture therapy for back pain. Patient declines but will contact me should his decision change, HELENA Nick.

## 2024-12-12 ENCOUNTER — TELEPHONE (OUTPATIENT)
Dept: HEMATOLOGY/ONCOLOGY | Facility: CLINIC | Age: 69
End: 2024-12-12
Payer: MEDICARE

## 2024-12-12 ENCOUNTER — CLINICAL SUPPORT (OUTPATIENT)
Dept: HEMATOLOGY/ONCOLOGY | Facility: CLINIC | Age: 69
End: 2024-12-12
Payer: MEDICARE

## 2024-12-12 ENCOUNTER — LAB VISIT (OUTPATIENT)
Dept: LAB | Facility: HOSPITAL | Age: 69
End: 2024-12-12
Attending: INTERNAL MEDICINE
Payer: MEDICARE

## 2024-12-12 DIAGNOSIS — C34.32 ADENOCARCINOMA OF LOWER LOBE OF LEFT LUNG: ICD-10-CM

## 2024-12-12 DIAGNOSIS — C34.32 ADENOCARCINOMA OF LOWER LOBE OF LEFT LUNG: Primary | ICD-10-CM

## 2024-12-12 LAB
ALBUMIN SERPL BCP-MCNC: 3 G/DL (ref 3.5–5.2)
ALP SERPL-CCNC: 93 U/L (ref 40–150)
ALT SERPL W/O P-5'-P-CCNC: 18 U/L (ref 10–44)
ANION GAP SERPL CALC-SCNC: 12 MMOL/L (ref 8–16)
AST SERPL-CCNC: 18 U/L (ref 10–40)
BILIRUB SERPL-MCNC: 0.4 MG/DL (ref 0.1–1)
BUN SERPL-MCNC: 10 MG/DL (ref 8–23)
CALCIUM SERPL-MCNC: 9.7 MG/DL (ref 8.7–10.5)
CHLORIDE SERPL-SCNC: 102 MMOL/L (ref 95–110)
CO2 SERPL-SCNC: 25 MMOL/L (ref 23–29)
CREAT SERPL-MCNC: 0.8 MG/DL (ref 0.5–1.4)
ERYTHROCYTE [DISTWIDTH] IN BLOOD BY AUTOMATED COUNT: 15.9 % (ref 11.5–14.5)
EST. GFR  (NO RACE VARIABLE): >60 ML/MIN/1.73 M^2
GLUCOSE SERPL-MCNC: 138 MG/DL (ref 70–110)
HCT VFR BLD AUTO: 31.7 % (ref 40–54)
HGB BLD-MCNC: 9.7 G/DL (ref 14–18)
IMM GRANULOCYTES # BLD AUTO: 0.02 K/UL (ref 0–0.04)
MCH RBC QN AUTO: 28.7 PG (ref 27–31)
MCHC RBC AUTO-ENTMCNC: 30.6 G/DL (ref 32–36)
MCV RBC AUTO: 94 FL (ref 82–98)
NEUTROPHILS # BLD AUTO: 5.7 K/UL (ref 1.8–7.7)
PLATELET # BLD AUTO: 259 K/UL (ref 150–450)
PMV BLD AUTO: 9 FL (ref 9.2–12.9)
POTASSIUM SERPL-SCNC: 3.4 MMOL/L (ref 3.5–5.1)
PROT SERPL-MCNC: 8.6 G/DL (ref 6–8.4)
RBC # BLD AUTO: 3.38 M/UL (ref 4.6–6.2)
SODIUM SERPL-SCNC: 139 MMOL/L (ref 136–145)
WBC # BLD AUTO: 7.46 K/UL (ref 3.9–12.7)

## 2024-12-12 PROCEDURE — 36415 COLL VENOUS BLD VENIPUNCTURE: CPT | Performed by: HOSPITALIST

## 2024-12-12 PROCEDURE — 99999 PR PBB SHADOW E&M-EST. PATIENT-LVL III: CPT | Mod: PBBFAC,,,

## 2024-12-12 PROCEDURE — 80053 COMPREHEN METABOLIC PANEL: CPT | Performed by: HOSPITALIST

## 2024-12-12 PROCEDURE — 99213 OFFICE O/P EST LOW 20 MIN: CPT | Mod: PBBFAC

## 2024-12-12 PROCEDURE — 85027 COMPLETE CBC AUTOMATED: CPT | Performed by: HOSPITALIST

## 2024-12-12 NOTE — PROGRESS NOTES
Met with patient in person_ to discuss upcoming systemic therapy and immunotherapy initiation. Discussed patient diagnosis including staging information. Also discussed that therapy regimen prescribed involves the following drugs: Docetaxel, Cyramza and Compazine prn , and timeline of therapy administration. Went over what to expect on first day of treatment, including what to bring with you, visitor policy, and infusion suite guidelines. Also discussed supportive and shared services available to patient, including social work, financial counseling, oncology nutrition, and oncology psychology.      Pt states that he is taking Ofev (Nintedinib) 100 mg BID which is not listed on his medication record.  I have notified Dr. Granados of this and am awaiting response as to whether pt should continue taking this medication .  Will follow up .    Reviewed dental information at length as pt is to have a tooth extracted in January 2025.  Pt is aware that he will need blood work done prior.        Covered with patient potential side effects and symptom management. Reviewed supportive medications that will be given before, during, and after treatment. Also stressed that other side effects are possible outside of those listed. Spent additional time on signs of infection, infection prevention, and proper nutrition/hydration.    Education provided to patient via (_chemotherapy education binder___). Also provided contact information for clinic and information related to [facility] communication. Discussed communication process for after-hours needs and some common scenarios in which patient should call provider for guidance vs. immediately report to the emergency room.     Finally, patient was given  contact information and role of oncology navigator was discussed. Encouraged patient to call with any questions, concerns, or needs. Patient verbalized understanding of all above information.

## 2024-12-12 NOTE — TELEPHONE ENCOUNTER
Added to medication list       ----- Message from Rudi Granados MD sent at 12/12/2024 11:54 AM CST -----  Regarding: RE: Nintedinib  Hi!    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 I cede to them regarding its use - if he reports he is currently using it, then I would have him continue on that.     Thanks!  ----- Message -----  From: Nicolle Cooper RN  Sent: 12/12/2024  11:08 AM CST  To: Rudi Granados IV, MD  Subject: Nintedinib                                       Hi Dr. Granados,    I just had an education session with this patient and he brought up the medication Ofev (Nintedinib).  It is not on his medication record , however I do see that you indicated he was on it since 2020 in your note.      Is he to continue this?  He is currently taking 100 mg BID     If so , I will add it to his medication list.     Thanks!  Nicolle

## 2024-12-16 ENCOUNTER — OFFICE VISIT (OUTPATIENT)
Dept: HEMATOLOGY/ONCOLOGY | Facility: CLINIC | Age: 69
End: 2024-12-16
Payer: MEDICARE

## 2024-12-16 ENCOUNTER — TELEPHONE (OUTPATIENT)
Dept: RADIOLOGY | Facility: HOSPITAL | Age: 69
End: 2024-12-16
Payer: MEDICARE

## 2024-12-16 VITALS
HEART RATE: 123 BPM | OXYGEN SATURATION: 97 % | HEIGHT: 71 IN | RESPIRATION RATE: 20 BRPM | BODY MASS INDEX: 25.28 KG/M2 | WEIGHT: 180.56 LBS | SYSTOLIC BLOOD PRESSURE: 121 MMHG | TEMPERATURE: 98 F | DIASTOLIC BLOOD PRESSURE: 82 MMHG

## 2024-12-16 DIAGNOSIS — C77.1 SECONDARY MALIGNANT NEOPLASM OF INTRATHORACIC LYMPH NODES: ICD-10-CM

## 2024-12-16 DIAGNOSIS — G62.0 CHEMOTHERAPY-INDUCED PERIPHERAL NEUROPATHY: ICD-10-CM

## 2024-12-16 DIAGNOSIS — T45.1X5A CHEMOTHERAPY-INDUCED PERIPHERAL NEUROPATHY: ICD-10-CM

## 2024-12-16 DIAGNOSIS — C34.32 ADENOCARCINOMA OF LOWER LOBE OF LEFT LUNG: Primary | ICD-10-CM

## 2024-12-16 DIAGNOSIS — C79.51 SECONDARY MALIGNANT NEOPLASM OF BONE: ICD-10-CM

## 2024-12-16 DIAGNOSIS — M34.9 SCLERODERMA: ICD-10-CM

## 2024-12-16 PROCEDURE — 99999 PR PBB SHADOW E&M-EST. PATIENT-LVL V: CPT | Mod: PBBFAC,,, | Performed by: HOSPITALIST

## 2024-12-16 PROCEDURE — 99215 OFFICE O/P EST HI 40 MIN: CPT | Mod: PBBFAC | Performed by: HOSPITALIST

## 2024-12-16 PROCEDURE — G2211 COMPLEX E/M VISIT ADD ON: HCPCS | Mod: S$PBB,,, | Performed by: HOSPITALIST

## 2024-12-16 PROCEDURE — 99215 OFFICE O/P EST HI 40 MIN: CPT | Mod: S$PBB,,, | Performed by: HOSPITALIST

## 2024-12-16 NOTE — PROGRESS NOTES
[facility] MEDICAL ONCOLOGY - FOLLOW UP VISIT    Reason for visit: Second opinion, Stage IV Adenocarcinoma of the Lung      Oncology History   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   5/15/2024 Imaging Significant Findings    CT C, Non-Con (f/u after imaging for scleroderma clinical trial workup)  - 2.5 x 2.4 x 1.8 cm LLL nodule (previously 2.2 x 2.2 x 1.8 cm, 3/25/24)  - Patchy ground-glass and nodular opacities adjacent to LLL nodule  - 1.4 cm left hilar lymph node  - Right lower paratracheal lymphadenopathy  - Lytic lesion with erosive changes in T2 vertebral body, new lytic lesions in manubrium, right fourth rib, T3, T7, and T11 vertebral bodies with pathologic fracture of superior endplate of T3 vertebral body     5/15/2024 Procedure    Bronch w/ EBUS  LLL TBBx  - Adenocarcinoma (Positive: TTF-1; Negative: p40)    LLL, Core Biopsy  - Adenocarcinoma    LN  - Positive: Station 11L  - Negative: Station 4R, 7, 4L    Caris NGS / PD-L1      - KEAP1, TP53, STK11 mutated  - ERBB2 negative / 1+  - TMB 7  - TORIE       6/5/2024 Imaging Significant Findings    PET CT  - 2.2 cm LLL mass, SUV 4.8  - 1.6 cm left hilar node, SUV 6.8  - 1.3 cm right lower paratracheal node, SUV 2.2  - Hypermetabolic lytic lesions in multiple bones (C7 transverse process, multiple thoracic, lumbar, and sacral segments, sternum, right scapular body, left scapular angle, several ribs, left ilium, left pubic body, bilateral ischia)     7/5/2024 Imaging Significant Findings    MRI Brain  - LANNY in brain or dura  - Mild enhancement within clivus, cannot rule out bony met     7/8/2024 Initial Diagnosis    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     7/8/2024 Cancer Staged    Staging form: Lung, AJCC 8th Edition  - Clinical: Stage IVB (cT2, cN1, pM1c)     8/9/2024 Imaging Significant Findings    MRI C/T/L Spine  Diffuse osseous metastasis throughout the visualized spine without any evidence of significant  compression fracture or retropulsion with involvement of the vertebral bodies and posterior elements at multiple levels.   No evidence of cervical/thoracic spinal cord compression with impingement of the ventral aspect of the cord from adjacent disc disease at multiple levels as discussed above.   Discal disease with foraminal stenosis at multiple levels throughout the spine with most prominent involvement seen at L4-5 and L5-S1 levels mostly degenerative in nature.   No abnormal intraspinal enhancement with no epidural enhancement noted        9/27/2024 Imaging Significant Findings    PET CT (compared against PET CT from 6/5/24)  1.4 x 1.0 cm LLL nodule (previously 2.0 x 1.3 cm), SUV 6.8 (similar)  New fairly defined opacity adjacent to the primary mass measuring up 1.8. The activity overlying the primary tumor is now confluent with the new opacity, leading to increased extent of activity.   1.2 x 2.0 cm L hilar LN (previously 1.4 x 2.2 cm), SUV 5.2  Numerous FDG-avid bone metastases are noted. Although previously visualized lesions show some areas of improvement, there are multiple new active bone metastases (L4, T8). The pattern suggests initial response to treatment with subsequent progressive disease      12/16/2024 -  Chemotherapy    Treatment Summary   Plan Name: OP NSCLC ramucirumab DOCEtaxel Q3W   Treatment Goal: Palliative  Status: Active  Start Date: 12/16/2024 (Planned)  End Date: 8/4/2025 (Planned)  Provider: Rudi Granados IV, MD  Chemotherapy: DOCEtaxel (TAXOTERE) 75 mg/m2 = 152 mg in 0.9% NaCl 257.6 mL chemo infusion, 75 mg/m2, Intravenous, Clinic/HOD 1 time, 0 of 12 cycles  ramucirumab (CYRAMZA) 819 mg in 0.9% NaCl 250 mL chemo infusion, 10 mg/kg, Intravenous, Clinic/HOD 1 time, 0 of 12 cycles      Chemotherapy    Treatment Summary   Plan Name: OP NSCLC ramucirumab DOCEtaxel Q3W   Treatment Goal: Palliative  Status: Active  Start Date: 12/2/2024 (Planned)  End Date: 7/21/2025 (Planned)  Provider:  "Rudi Granados IV, MD  Chemotherapy: DOCEtaxel (TAXOTERE) 75 mg/m2 = 154 mg in 0.9% NaCl 257.7 mL chemo infusion, 75 mg/m2 = 154 mg, Intravenous, Clinic/HOD 1 time, 0 of 12 cycles    ramucirumab (CYRAMZA) 848 mg in 0.9% NaCl 250 mL chemo infusion, 10 mg/kg = 848 mg, Intravenous, Clinic/HOD 1 time, 0 of 12 cycles          Previous Workup:   - 6/5/24:  Medical oncology (Dr. Erik Clay), discussed carboplatin/paclitaxel, plan to proceed  - 6/5/24: C1D1 carboplatin/paclitaxel  - 6/26/24: C2D1 carboplatin/paclitaxel    Oncology History    HPI:     Ernie Walton is a 69 y.o. male with pmh significant for systemic sclerosis/scleroderma (dx'd 2020, on mycophenolate mofetil, prednisone, and nintendinab), HTN, HLD, BPH, h/o nephrolithiasis w/ recurrent UTI, and DDD who presents for f/u of the below lung cancer. His primary oncologist has been Dr. Erik Clay, and he has established care with Dr. Holley ([facility]).    1)  Stage IVB ([id number]) adenocarcinoma of the LLL (PD-L1 TPS 1%, no targetable mutations, STK11/TP53/KEAP1 co-mutations) w/ diffuse osseous disease , initially dx'd 5/15/24, s/p carboplatin/paclitaxel (6/5/24 - 9/17/24), and on supportive denosumab (last dose 9/17/24)    Last clinic 11/25/24, plan to start docetaxel plus ramucirumab, baseline CT C/A/P pending.  Enroll in chemocare  at next visit.  Consent with C1D1.    Interval History:  11/25/24: CT C/A/P, as above  12/12/24: Chemotherapy education    The pt states he is taking 800 mg ibuprofen twice daily, for his "skeletal pain". He feels this is managing his pain well.     Besides the pain, he says "I'm functional, I guess. I don't have much energy. My appetite is poor. I'm short of breath a lot".     Regarding his appetite, he says that he simply does not have much of one. He says that he doesn't have the taste for food. He says that milkshakes and instant breakfast work, but he has a hard time keeping food down if he's forcing to eat.     Regarding " "his SOB, he feels that it is stable and relates this to his scleroderma.     The pt states that he is interested with acupuncture and says that this is currently being arranged. He hopes to address loss of appetite, nausea, and bone pain.     The pt states that he had night sweats, but attributes this to sleeping with heavy blankets because his feet have been cold. He attributes the cold feet to his peripheral neuropathy.     He confirms that he received the chemotherapy class on 12/12/24, and says that he does not have any questions.     The patient denies any new or worsening symptoms today.  He presents today with his sister.    ROS:   As per HPI.       Physical Exam:       /82   Pulse (!) 123   Temp 98.1 °F (36.7 °C) (Temporal)   Resp 20   Ht 5' 11" (1.803 m)   Wt 81.9 kg (180 lb 8.9 oz)   SpO2 97%   BMI 25.18 kg/m²                Physical Exam  Constitutional:       Appearance: Normal appearance.   HENT:      Head: Normocephalic and atraumatic.   Eyes:      Extraocular Movements: Extraocular movements intact.      Conjunctiva/sclera: Conjunctivae normal.      Pupils: Pupils are equal, round, and reactive to light.   Cardiovascular:      Rate and Rhythm: Normal rate and regular rhythm.      Heart sounds: No murmur heard.     No friction rub. No gallop.   Pulmonary:      Effort: Pulmonary effort is normal.      Breath sounds: No wheezing, rhonchi or rales.   Musculoskeletal:         General: Normal range of motion.      Right lower leg: No edema.      Left lower leg: No edema.      Comments: Sclerodactyly evident   Skin:     General: Skin is warm and dry.   Neurological:      Mental Status: He is alert and oriented to person, place, and time.   Psychiatric:         Mood and Affect: Mood normal.         Thought Content: Thought content normal.         Judgment: Judgment normal.           Labs:   No results found for this or any previous visit (from the past 48 hours).     Imaging:    See oncologic " history above.     Path:  See oncologic history above.      Assessment and Plan:     Ernie Walton is a 69 y.o. male with pmh significant for systemic sclerosis/scleroderma (dx'd 2020, on mycophenolate mofetil, prednisone, and nintendinab), HTN, HLD, BPH, h/o nephrolithiasis w/ recurrent UTI, and DDD who presents for f/u of the below lung cancer. His primary oncologist has been Dr. Erik Clay, and he has established care with Dr. Holley ([facility]).    1)  Stage IVB (Q9P7H2m) adenocarcinoma of the LLL (PD-L1 TPS 1%, no targetable mutations, STK11/TP53/KEAP1 co-mutations) w/ diffuse osseous disease , initially dx'd 5/15/24, s/p carboplatin/paclitaxel (6/5/24 - 9/17/24), and on supportive denosumab (last dose 9/17/24)    Stage IVB Adenocarcinoma of the LLL. PD-L1 1%, No Targetable Mutations  ECOG PS 1.  Patient presents in advance of C1D1 docetaxel/ramucirumab. Most recent imaging (CT C/A/P, 11/25/24) demonstrates the known LLL mass, L hilar adenopathy, and extensive osseous metastatic disease (scattered lytic and sclerotic lesions). This is compared against a PET CT from 9/27/24 which demonstrated decrement in the size of the primary LLL mass but with worsening adjacent peripheral disease, mild decrement in size of the left hilar lymph node, and mixed response in the bones (some areas of improvement with multiple new active bone metastases).  Today (12/16/24), I reiterated the general logistics, mechanism of action, and potential side effects (up to and including death) of docetaxel/ramucirumab.  We also discussed that treatment may be complicated by/exacerbate the patient's underlying scleroderma.  Pt endorsed his understanding and signed consent today (12/16/24). Given prior difficulty with IV access as well as scleroderma, and given that docetaxel is an irritant with vesicant like properties, discussed option to proceed with cycle 1 without port and close monitoring with port placement prior to cycle 2, versus obtaining  a port prior to cycle 1.  Patient prefers the latter, which will be placed tomorrow (12/17/24).    PLAN:   Port placement on 12/17/24  Labs, RTC w/ ADRIANNA, and C1D1 docetaxel/ramucirumab week of 12/23/24, consent signed. Discuss chemocare  at this visit.   Repeat images 12 weeks from last  Awaiting call back from patient's rheumatologist (Dr. Yazan Cordero) regarding both ramucirumab as well as immunotherapy in general      Bone Mets  Pt w/ diffuse osseous disease involving the C/T/L/S spine, sternum, R scapular body, L scapular angle, several ribs, L ilium, L pubic body, and b/l ischia. Pt was previously on xgeva, last dose on 9/17/24. On discussion with Dr. Clay's staff, the patient did NOT receive dental clearance in advance of this. The pt confirms this, and notes that he is planning to undergo a dental extraction in 1/2025. Discussed need to hold on further osteoclast inhibition in advance of this, and the pt was provided with a dental clearance form. Upon dental clearance, will transition to zometa q12w due to distance from [place].   Dental clearance pending      Peripheral Neuropathy  See previous note for description. Given timeline, most consistent with chemotherapy induced PN. Pt previously on pregabalin to minimal effect, transitioned to duloxetine.   Continue duloxetine 60 mg daily      Anemia  Hgn 9.1 (11/25/24), from 10 on 9/23/24, from 11.1 on 8/27/24. May still be some residual marrow suppression from cytotoxic therapy (though would anticipate recovery by this time) as well as anemia of chronic inflammation.   Repeat labs w/ next visit      Scleroderma  Currently under the care of her rheumatologist in [place], on mycophenolate mofetil, nintendinab, and prednisone.  Active treatment precludes safe utilization of immunotherapy.  Awaiting response from Dr. Cordero as above in advance of ramucirumab (do not anticipate difficulties with this) and immunotherapy in general      The above information  has been reviewed with the patient and all questions have been answered to their apparent satisfaction.  They understand that they can call the clinic with any questions.    Rudi Granados MD  Hematology/Oncology  [facility] [place]      Route Chart for Scheduling    Med Onc Chart Routing      Follow up with physician . Labs, RTC w/ ADRIANNA, and C1D1 docetaxel/ramucirumab week of 12/23/24   Follow up with ADRIANNA    Infusion scheduling note    Injection scheduling note    Labs Urinalysis, CBC and CMP   Scheduling:  Preferred lab:  Lab interval:     Imaging    Pharmacy appointment    Other referrals                      Disclaimer: This note was prepared using voice recognition system and is likely to have sound alike errors.  Please call me with any questions.

## 2024-12-16 NOTE — TELEPHONE ENCOUNTER
Interventional Radiology  Scheduled 1:00PM Mediport placement for 12/17/24 yara/[place] at Dr. Wood' office and [place] at Dr. Granados's office.  Instructed that pt. should arrive by 11:30AM to the hospital ([address]/ Entr[address]) off [address] in [place] and check in at Patient Registration on the first floor.  Informed that pt. must have a ride and be NPO after 4:00AM the day of procedure.  Pt. does not take blood thinners that need to be stopped prior to this procedure.

## 2024-12-16 NOTE — H&P (VIEW-ONLY)
[facility] MEDICAL ONCOLOGY - FOLLOW UP VISIT    Reason for visit: Second opinion, Stage IV Adenocarcinoma of the Lung      Oncology History   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   5/15/2024 Imaging Significant Findings    CT C, Non-Con (f/u after imaging for scleroderma clinical trial workup)  - 2.5 x 2.4 x 1.8 cm LLL nodule (previously 2.2 x 2.2 x 1.8 cm, 3/25/24)  - Patchy ground-glass and nodular opacities adjacent to LLL nodule  - 1.4 cm left hilar lymph node  - Right lower paratracheal lymphadenopathy  - Lytic lesion with erosive changes in T2 vertebral body, new lytic lesions in manubrium, right fourth rib, T3, T7, and T11 vertebral bodies with pathologic fracture of superior endplate of T3 vertebral body     5/15/2024 Procedure    Bronch w/ EBUS  LLL TBBx  - Adenocarcinoma (Positive: TTF-1; Negative: p40)    LLL, Core Biopsy  - Adenocarcinoma    LN  - Positive: Station 11L  - Negative: Station 4R, 7, 4L    Caris NGS / PD-L1      - KEAP1, TP53, STK11 mutated  - ERBB2 negative / 1+  - TMB 7  - TORIE       6/5/2024 Imaging Significant Findings    PET CT  - 2.2 cm LLL mass, SUV 4.8  - 1.6 cm left hilar node, SUV 6.8  - 1.3 cm right lower paratracheal node, SUV 2.2  - Hypermetabolic lytic lesions in multiple bones (C7 transverse process, multiple thoracic, lumbar, and sacral segments, sternum, right scapular body, left scapular angle, several ribs, left ilium, left pubic body, bilateral ischia)     7/5/2024 Imaging Significant Findings    MRI Brain  - LANNY in brain or dura  - Mild enhancement within clivus, cannot rule out bony met     7/8/2024 Initial Diagnosis    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     7/8/2024 Cancer Staged    Staging form: Lung, AJCC 8th Edition  - Clinical: Stage IVB (cT2, cN1, pM1c)     8/9/2024 Imaging Significant Findings    MRI C/T/L Spine  Diffuse osseous metastasis throughout the visualized spine without any evidence of significant  compression fracture or retropulsion with involvement of the vertebral bodies and posterior elements at multiple levels.   No evidence of cervical/thoracic spinal cord compression with impingement of the ventral aspect of the cord from adjacent disc disease at multiple levels as discussed above.   Discal disease with foraminal stenosis at multiple levels throughout the spine with most prominent involvement seen at L4-5 and L5-S1 levels mostly degenerative in nature.   No abnormal intraspinal enhancement with no epidural enhancement noted        9/27/2024 Imaging Significant Findings    PET CT (compared against PET CT from 6/5/24)  1.4 x 1.0 cm LLL nodule (previously 2.0 x 1.3 cm), SUV 6.8 (similar)  New fairly defined opacity adjacent to the primary mass measuring up 1.8. The activity overlying the primary tumor is now confluent with the new opacity, leading to increased extent of activity.   1.2 x 2.0 cm L hilar LN (previously 1.4 x 2.2 cm), SUV 5.2  Numerous FDG-avid bone metastases are noted. Although previously visualized lesions show some areas of improvement, there are multiple new active bone metastases (L4, T8). The pattern suggests initial response to treatment with subsequent progressive disease      12/16/2024 -  Chemotherapy    Treatment Summary   Plan Name: OP NSCLC ramucirumab DOCEtaxel Q3W   Treatment Goal: Palliative  Status: Active  Start Date: 12/16/2024 (Planned)  End Date: 8/4/2025 (Planned)  Provider: Rudi Granados IV, MD  Chemotherapy: DOCEtaxel (TAXOTERE) 75 mg/m2 = 152 mg in 0.9% NaCl 257.6 mL chemo infusion, 75 mg/m2, Intravenous, Clinic/HOD 1 time, 0 of 12 cycles  ramucirumab (CYRAMZA) 819 mg in 0.9% NaCl 250 mL chemo infusion, 10 mg/kg, Intravenous, Clinic/HOD 1 time, 0 of 12 cycles      Chemotherapy    Treatment Summary   Plan Name: OP NSCLC ramucirumab DOCEtaxel Q3W   Treatment Goal: Palliative  Status: Active  Start Date: 12/2/2024 (Planned)  End Date: 7/21/2025 (Planned)  Provider:  "Rudi Granados IV, MD  Chemotherapy: DOCEtaxel (TAXOTERE) 75 mg/m2 = 154 mg in 0.9% NaCl 257.7 mL chemo infusion, 75 mg/m2 = 154 mg, Intravenous, Clinic/HOD 1 time, 0 of 12 cycles    ramucirumab (CYRAMZA) 848 mg in 0.9% NaCl 250 mL chemo infusion, 10 mg/kg = 848 mg, Intravenous, Clinic/HOD 1 time, 0 of 12 cycles          Previous Workup:   - 6/5/24:  Medical oncology (Dr. Erik Clay), discussed carboplatin/paclitaxel, plan to proceed  - 6/5/24: C1D1 carboplatin/paclitaxel  - 6/26/24: C2D1 carboplatin/paclitaxel    Oncology History    HPI:     Ernie Walton is a 69 y.o. male with pmh significant for systemic sclerosis/scleroderma (dx'd 2020, on mycophenolate mofetil, prednisone, and nintendinab), HTN, HLD, BPH, h/o nephrolithiasis w/ recurrent UTI, and DDD who presents for f/u of the below lung cancer. His primary oncologist has been Dr. Erik Clay, and he has established care with Dr. Holley ([facility]).    1)  Stage IVB (L6S1F1y) adenocarcinoma of the LLL (PD-L1 TPS 1%, no targetable mutations, STK11/TP53/KEAP1 co-mutations) w/ diffuse osseous disease , initially dx'd 5/15/24, s/p carboplatin/paclitaxel (6/5/24 - 9/17/24), and on supportive denosumab (last dose 9/17/24)    Last clinic 11/25/24, plan to start docetaxel plus ramucirumab, baseline CT C/A/P pending.  Enroll in chemocare  at next visit.  Consent with C1D1.    Interval History:  11/25/24: CT C/A/P, as above  12/12/24: Chemotherapy education    The pt states he is taking 800 mg ibuprofen twice daily, for his "skeletal pain". He feels this is managing his pain well.     Besides the pain, he says "I'm functional, I guess. I don't have much energy. My appetite is poor. I'm short of breath a lot".     Regarding his appetite, he says that he simply does not have much of one. He says that he doesn't have the taste for food. He says that milkshakes and instant breakfast work, but he has a hard time keeping food down if he's forcing to eat.     Regarding " "his SOB, he feels that it is stable and relates this to his scleroderma.     The pt states that he is interested with acupuncture and says that this is currently being arranged. He hopes to address loss of appetite, nausea, and bone pain.     The pt states that he had night sweats, but attributes this to sleeping with heavy blankets because his feet have been cold. He attributes the cold feet to his peripheral neuropathy.     He confirms that he received the chemotherapy class on 12/12/24, and says that he does not have any questions.     The patient denies any new or worsening symptoms today.  He presents today with his sister.    ROS:   As per HPI.       Physical Exam:       /82   Pulse (!) 123   Temp 98.1 °F (36.7 °C) (Temporal)   Resp 20   Ht 5' 11" (1.803 m)   Wt 81.9 kg (180 lb 8.9 oz)   SpO2 97%   BMI 25.18 kg/m²                Physical Exam  Constitutional:       Appearance: Normal appearance.   HENT:      Head: Normocephalic and atraumatic.   Eyes:      Extraocular Movements: Extraocular movements intact.      Conjunctiva/sclera: Conjunctivae normal.      Pupils: Pupils are equal, round, and reactive to light.   Cardiovascular:      Rate and Rhythm: Normal rate and regular rhythm.      Heart sounds: No murmur heard.     No friction rub. No gallop.   Pulmonary:      Effort: Pulmonary effort is normal.      Breath sounds: No wheezing, rhonchi or rales.   Musculoskeletal:         General: Normal range of motion.      Right lower leg: No edema.      Left lower leg: No edema.      Comments: Sclerodactyly evident   Skin:     General: Skin is warm and dry.   Neurological:      Mental Status: He is alert and oriented to person, place, and time.   Psychiatric:         Mood and Affect: Mood normal.         Thought Content: Thought content normal.         Judgment: Judgment normal.           Labs:   No results found for this or any previous visit (from the past 48 hours).     Imaging:    See oncologic " history above.     Path:  See oncologic history above.      Assessment and Plan:     Ernie Walton is a 69 y.o. male with pmh significant for systemic sclerosis/scleroderma (dx'd 2020, on mycophenolate mofetil, prednisone, and nintendinab), HTN, HLD, BPH, h/o nephrolithiasis w/ recurrent UTI, and DDD who presents for f/u of the below lung cancer. His primary oncologist has been Dr. Erik Clay, and he has established care with Dr. Holley ([facility] [place] [facility]).    1)  Stage IVB ([id number]) adenocarcinoma of the LLL (PD-L1 TPS 1%, no targetable mutations, STK11/TP53/KEAP1 co-mutations) w/ diffuse osseous disease , initially dx'd 5/15/24, s/p carboplatin/paclitaxel (6/5/24 - 9/17/24), and on supportive denosumab (last dose 9/17/24)    Stage IVB Adenocarcinoma of the LLL. PD-L1 1%, No Targetable Mutations  ECOG PS 1.  Patient presents in advance of C1D1 docetaxel/ramucirumab. Most recent imaging (CT C/A/P, 11/25/24) demonstrates the known LLL mass, L hilar adenopathy, and extensive osseous metastatic disease (scattered lytic and sclerotic lesions). This is compared against a PET CT from 9/27/24 which demonstrated decrement in the size of the primary LLL mass but with worsening adjacent peripheral disease, mild decrement in size of the left hilar lymph node, and mixed response in the bones (some areas of improvement with multiple new active bone metastases).  Today (12/16/24), I reiterated the general logistics, mechanism of action, and potential side effects (up to and including death) of docetaxel/ramucirumab.  We also discussed that treatment may be complicated by/exacerbate the patient's underlying scleroderma.  Pt endorsed his understanding and signed consent today (12/16/24). Given prior difficulty with IV access as well as scleroderma, and given that docetaxel is an irritant with vesicant like properties, discussed option to proceed with cycle 1 without port and close monitoring with port placement prior to cycle 2, versus obtaining  a port prior to cycle 1.  Patient prefers the latter, which will be placed tomorrow (12/17/24).    PLAN:   Port placement on 12/17/24  Labs, RTC w/ ADRIANNA, and C1D1 docetaxel/ramucirumab week of 12/23/24, consent signed. Discuss chemocare  at this visit.   Repeat images 12 weeks from last  Awaiting call back from patient's rheumatologist (Dr. Yazan Cordero) regarding both ramucirumab as well as immunotherapy in general      Bone Mets  Pt w/ diffuse osseous disease involving the C/T/L/S spine, sternum, R scapular body, L scapular angle, several ribs, L ilium, L pubic body, and b/l ischia. Pt was previously on xgeva, last dose on 9/17/24. On discussion with Dr. Clay's staff, the patient did NOT receive dental clearance in advance of this. The pt confirms this, and notes that he is planning to undergo a dental extraction in 1/2025. Discussed need to hold on further osteoclast inhibition in advance of this, and the pt was provided with a dental clearance form. Upon dental clearance, will transition to zometa q12w due to distance from [place].   Dental clearance pending      Peripheral Neuropathy  See previous note for description. Given timeline, most consistent with chemotherapy induced PN. Pt previously on pregabalin to minimal effect, transitioned to duloxetine.   Continue duloxetine 60 mg daily      Anemia  Hgn 9.1 (11/25/24), from 10 on 9/23/24, from 11.1 on 8/27/24. May still be some residual marrow suppression from cytotoxic therapy (though would anticipate recovery by this time) as well as anemia of chronic inflammation.   Repeat labs w/ next visit      Scleroderma  Currently under the care of her rheumatologist in [place], on mycophenolate mofetil, nintendinab, and prednisone.  Active treatment precludes safe utilization of immunotherapy.  Awaiting response from Dr. Cordero as above in advance of ramucirumab (do not anticipate difficulties with this) and immunotherapy in general      The above information  has been reviewed with the patient and all questions have been answered to their apparent satisfaction.  They understand that they can call the clinic with any questions.    Rudi Granados MD  Hematology/Oncology  [facility] [place]      Route Chart for Scheduling    Med Onc Chart Routing      Follow up with physician . Labs, RTC w/ ADRIANNA, and C1D1 docetaxel/ramucirumab week of 12/23/24   Follow up with ADRIANNA    Infusion scheduling note    Injection scheduling note    Labs Urinalysis, CBC and CMP   Scheduling:  Preferred lab:  Lab interval:     Imaging    Pharmacy appointment    Other referrals                      Disclaimer: This note was prepared using voice recognition system and is likely to have sound alike errors.  Please call me with any questions.

## 2024-12-17 ENCOUNTER — HOSPITAL ENCOUNTER (OUTPATIENT)
Dept: RADIOLOGY | Facility: HOSPITAL | Age: 69
Discharge: HOME | End: 2024-12-17
Attending: PHYSICIAN ASSISTANT
Payer: MEDICARE

## 2024-12-17 VITALS
RESPIRATION RATE: 16 BRPM | DIASTOLIC BLOOD PRESSURE: 68 MMHG | WEIGHT: 180 LBS | HEART RATE: 83 BPM | OXYGEN SATURATION: 99 % | BODY MASS INDEX: 25.2 KG/M2 | HEIGHT: 71 IN | SYSTOLIC BLOOD PRESSURE: 111 MMHG

## 2024-12-17 DIAGNOSIS — C34.32 ADENOCARCINOMA OF LOWER LOBE OF LEFT LUNG: ICD-10-CM

## 2024-12-17 LAB
INR PPP: 1.1 (ref 0.8–1.2)
PROTHROMBIN TIME: 12.1 SEC (ref 9–12.5)

## 2024-12-17 PROCEDURE — 63600175 PHARM REV CODE 636 W HCPCS: Performed by: RADIOLOGY

## 2024-12-17 PROCEDURE — 99152 MOD SED SAME PHYS/QHP 5/>YRS: CPT | Mod: ,,, | Performed by: RADIOLOGY

## 2024-12-17 PROCEDURE — 36561 INSERT TUNNELED CV CATH: CPT | Mod: ,,, | Performed by: RADIOLOGY

## 2024-12-17 PROCEDURE — 76937 US GUIDE VASCULAR ACCESS: CPT | Mod: 26,,, | Performed by: RADIOLOGY

## 2024-12-17 PROCEDURE — 99153 MOD SED SAME PHYS/QHP EA: CPT | Performed by: RADIOLOGY

## 2024-12-17 PROCEDURE — 77001 FLUOROGUIDE FOR VEIN DEVICE: CPT | Mod: TC | Performed by: RADIOLOGY

## 2024-12-17 PROCEDURE — 99152 MOD SED SAME PHYS/QHP 5/>YRS: CPT | Performed by: RADIOLOGY

## 2024-12-17 PROCEDURE — C1894 INTRO/SHEATH, NON-LASER: HCPCS

## 2024-12-17 PROCEDURE — 76937 US GUIDE VASCULAR ACCESS: CPT | Mod: TC | Performed by: RADIOLOGY

## 2024-12-17 PROCEDURE — 85610 PROTHROMBIN TIME: CPT | Performed by: RADIOLOGY

## 2024-12-17 RX ORDER — CEFAZOLIN SODIUM 500 MG/2.2ML
INJECTION, POWDER, FOR SOLUTION INTRAMUSCULAR; INTRAVENOUS CODE/TRAUMA/SEDATION MEDICATION
Status: COMPLETED | OUTPATIENT
Start: 2024-12-17 | End: 2024-12-17

## 2024-12-17 RX ORDER — FENTANYL CITRATE 50 UG/ML
INJECTION, SOLUTION INTRAMUSCULAR; INTRAVENOUS CODE/TRAUMA/SEDATION MEDICATION
Status: COMPLETED | OUTPATIENT
Start: 2024-12-17 | End: 2024-12-17

## 2024-12-17 RX ORDER — HEPARIN 100 UNIT/ML
SYRINGE INTRAVENOUS CODE/TRAUMA/SEDATION MEDICATION
Status: COMPLETED | OUTPATIENT
Start: 2024-12-17 | End: 2024-12-17

## 2024-12-17 RX ORDER — LIDOCAINE HYDROCHLORIDE 20 MG/ML
INJECTION, SOLUTION EPIDURAL; INFILTRATION; INTRACAUDAL; PERINEURAL CODE/TRAUMA/SEDATION MEDICATION
Status: COMPLETED | OUTPATIENT
Start: 2024-12-17 | End: 2024-12-17

## 2024-12-17 RX ORDER — DIPHENHYDRAMINE HYDROCHLORIDE 50 MG/ML
INJECTION INTRAMUSCULAR; INTRAVENOUS CODE/TRAUMA/SEDATION MEDICATION
Status: COMPLETED | OUTPATIENT
Start: 2024-12-17 | End: 2024-12-17

## 2024-12-17 RX ADMIN — LIDOCAINE HYDROCHLORIDE 5 ML: 20 INJECTION, SOLUTION EPIDURAL; INFILTRATION; INTRACAUDAL; PERINEURAL at 01:12

## 2024-12-17 RX ADMIN — FENTANYL CITRATE 50 MCG: 50 INJECTION, SOLUTION INTRAMUSCULAR; INTRAVENOUS at 01:12

## 2024-12-17 RX ADMIN — DIPHENHYDRAMINE HYDROCHLORIDE 25 MG: 50 INJECTION INTRAMUSCULAR; INTRAVENOUS at 01:12

## 2024-12-17 RX ADMIN — CEFAZOLIN 2 G: 225 INJECTION, POWDER, FOR SOLUTION INTRAMUSCULAR; INTRAVENOUS at 01:12

## 2024-12-17 RX ADMIN — HEPARIN 300 UNITS: 100 SYRINGE at 01:12

## 2024-12-17 NOTE — PLAN OF CARE
Aquacel to right upper chest wall mediport insertion site C/D/I with no bleeding/redness/swelling noted. VSS, NADN, and pt meets criteria for discharge. Discharge instructions given to and reviewed with pt, and pt verbalized understanding of all. Pt discharged to home, taken out via wheelchair and driven home by brother.

## 2024-12-17 NOTE — DISCHARGE SUMMARY
[facility] Lab [facility] (Hospital)  Discharge Note  Short Stay    IR Tunneled Cath Placement With Port (XPD)      OUTCOME: Patient tolerated treatment/procedure well without complication and is now ready for discharge.    DISPOSITION: Home or Self Care    FINAL DIAGNOSIS:  <principal problem not specified>    FOLLOWUP: With primary care provider    DISCHARGE INSTRUCTIONS:  No discharge procedures on file.      Clinical Reference Documents Added to Patient Instructions         Document    [facility] DISCHARGE INSTRUCTIONS (ENGLISH)    PROCEDURAL SEDATION, ADULT ED (ENGLISH)            TIME SPENT ON DISCHARGE: 10 minutes

## 2024-12-17 NOTE — DISCHARGE INSTRUCTIONS
Please return to ER if any of these symptoms occur:  Fever over 101 degrees,  Any purulent drainage from site (pus, yellow or has foul odor), or any redness or swelling to site  Bleeding from the puncture site not controlled, If bleeding occurs at site hold pressure for 5 mins.  If bleeding continues go to ER  Pain not controlled with Aleve or Tylenol,     No driving for 24 hours after procedure due to sedation given during procedure.      Do not submerge in standing water for 2 days after biopsy but you may shower.     May change bandage if it becomes soiled and bandage may be removed in 2 days.     Rest for the next couple of days. Do not lift any thing heavier than a gallon of milk.  Increase activity as tolerated.     Resume home medications and diet     Please follow up with Dr. Granados with any other questions or concerns that you may have.

## 2024-12-20 ENCOUNTER — TELEPHONE (OUTPATIENT)
Dept: RADIOLOGY | Facility: HOSPITAL | Age: 69
End: 2024-12-20
Payer: MEDICARE

## 2024-12-23 ENCOUNTER — LAB VISIT (OUTPATIENT)
Dept: LAB | Facility: HOSPITAL | Age: 69
End: 2024-12-23
Attending: HOSPITALIST
Payer: MEDICARE

## 2024-12-23 ENCOUNTER — INFUSION (OUTPATIENT)
Dept: INFUSION THERAPY | Facility: HOSPITAL | Age: 69
End: 2024-12-23
Attending: HOSPITALIST
Payer: MEDICARE

## 2024-12-23 ENCOUNTER — OFFICE VISIT (OUTPATIENT)
Dept: HEMATOLOGY/ONCOLOGY | Facility: CLINIC | Age: 69
End: 2024-12-23
Payer: MEDICARE

## 2024-12-23 VITALS
DIASTOLIC BLOOD PRESSURE: 83 MMHG | TEMPERATURE: 98 F | OXYGEN SATURATION: 98 % | BODY MASS INDEX: 25.52 KG/M2 | WEIGHT: 183 LBS | HEART RATE: 104 BPM | SYSTOLIC BLOOD PRESSURE: 134 MMHG | RESPIRATION RATE: 18 BRPM

## 2024-12-23 DIAGNOSIS — C34.32 ADENOCARCINOMA OF LOWER LOBE OF LEFT LUNG: ICD-10-CM

## 2024-12-23 DIAGNOSIS — D53.9 NUTRITIONAL ANEMIA, UNSPECIFIED: ICD-10-CM

## 2024-12-23 DIAGNOSIS — D63.0 ANEMIA IN NEOPLASTIC DISEASE: ICD-10-CM

## 2024-12-23 DIAGNOSIS — C34.32 ADENOCARCINOMA OF LOWER LOBE OF LEFT LUNG: Primary | ICD-10-CM

## 2024-12-23 LAB
ALBUMIN SERPL BCP-MCNC: 2.7 G/DL (ref 3.5–5.2)
ALP SERPL-CCNC: 88 U/L (ref 40–150)
ALT SERPL W/O P-5'-P-CCNC: 23 U/L (ref 10–44)
ANION GAP SERPL CALC-SCNC: 15 MMOL/L (ref 8–16)
AST SERPL-CCNC: 21 U/L (ref 10–40)
BILIRUB SERPL-MCNC: 0.3 MG/DL (ref 0.1–1)
BUN SERPL-MCNC: 10 MG/DL (ref 8–23)
CALCIUM SERPL-MCNC: 9.8 MG/DL (ref 8.7–10.5)
CHLORIDE SERPL-SCNC: 101 MMOL/L (ref 95–110)
CO2 SERPL-SCNC: 23 MMOL/L (ref 23–29)
CREAT SERPL-MCNC: 0.8 MG/DL (ref 0.5–1.4)
ERYTHROCYTE [DISTWIDTH] IN BLOOD BY AUTOMATED COUNT: 16.2 % (ref 11.5–14.5)
EST. GFR  (NO RACE VARIABLE): >60 ML/MIN/1.73 M^2
FERRITIN SERPL-MCNC: 876 NG/ML (ref 20–300)
FOLATE SERPL-MCNC: 14.1 NG/ML (ref 4–24)
GLUCOSE SERPL-MCNC: 137 MG/DL (ref 70–110)
HCT VFR BLD AUTO: 28.9 % (ref 40–54)
HGB BLD-MCNC: 8.9 G/DL (ref 14–18)
IMM GRANULOCYTES # BLD AUTO: 0.02 K/UL (ref 0–0.04)
IRON SERPL-MCNC: 28 UG/DL (ref 45–160)
MCH RBC QN AUTO: 27.6 PG (ref 27–31)
MCHC RBC AUTO-ENTMCNC: 30.8 G/DL (ref 32–36)
MCV RBC AUTO: 90 FL (ref 82–98)
NEUTROPHILS # BLD AUTO: 5.8 K/UL (ref 1.8–7.7)
PLATELET # BLD AUTO: 334 K/UL (ref 150–450)
PMV BLD AUTO: 9.4 FL (ref 9.2–12.9)
POTASSIUM SERPL-SCNC: 3.4 MMOL/L (ref 3.5–5.1)
PROT SERPL-MCNC: 8.6 G/DL (ref 6–8.4)
RBC # BLD AUTO: 3.22 M/UL (ref 4.6–6.2)
SATURATED IRON: 12 % (ref 20–50)
SODIUM SERPL-SCNC: 139 MMOL/L (ref 136–145)
TOTAL IRON BINDING CAPACITY: 225 UG/DL (ref 250–450)
TRANSFERRIN SERPL-MCNC: 152 MG/DL (ref 200–375)
VIT B12 SERPL-MCNC: >2000 PG/ML (ref 210–950)
WBC # BLD AUTO: 7.66 K/UL (ref 3.9–12.7)

## 2024-12-23 PROCEDURE — 96417 CHEMO IV INFUS EACH ADDL SEQ: CPT

## 2024-12-23 PROCEDURE — 84466 ASSAY OF TRANSFERRIN: CPT | Performed by: NURSE PRACTITIONER

## 2024-12-23 PROCEDURE — 80053 COMPREHEN METABOLIC PANEL: CPT | Performed by: HOSPITALIST

## 2024-12-23 PROCEDURE — 96367 TX/PROPH/DG ADDL SEQ IV INF: CPT

## 2024-12-23 PROCEDURE — 25000003 PHARM REV CODE 250: Performed by: INTERNAL MEDICINE

## 2024-12-23 PROCEDURE — 85027 COMPLETE CBC AUTOMATED: CPT | Performed by: HOSPITALIST

## 2024-12-23 PROCEDURE — 96413 CHEMO IV INFUSION 1 HR: CPT

## 2024-12-23 PROCEDURE — 99215 OFFICE O/P EST HI 40 MIN: CPT | Mod: 95,,, | Performed by: NURSE PRACTITIONER

## 2024-12-23 PROCEDURE — 82728 ASSAY OF FERRITIN: CPT | Performed by: NURSE PRACTITIONER

## 2024-12-23 PROCEDURE — 36415 COLL VENOUS BLD VENIPUNCTURE: CPT | Performed by: NURSE PRACTITIONER

## 2024-12-23 PROCEDURE — A4216 STERILE WATER/SALINE, 10 ML: HCPCS | Performed by: INTERNAL MEDICINE

## 2024-12-23 PROCEDURE — 96375 TX/PRO/DX INJ NEW DRUG ADDON: CPT

## 2024-12-23 PROCEDURE — 82607 VITAMIN B-12: CPT | Performed by: NURSE PRACTITIONER

## 2024-12-23 PROCEDURE — 82746 ASSAY OF FOLIC ACID SERUM: CPT | Performed by: NURSE PRACTITIONER

## 2024-12-23 PROCEDURE — 63600175 PHARM REV CODE 636 W HCPCS: Mod: JZ,JG | Performed by: INTERNAL MEDICINE

## 2024-12-23 PROCEDURE — 36415 COLL VENOUS BLD VENIPUNCTURE: CPT | Performed by: HOSPITALIST

## 2024-12-23 RX ORDER — PROCHLORPERAZINE EDISYLATE 5 MG/ML
5 INJECTION INTRAMUSCULAR; INTRAVENOUS ONCE AS NEEDED
Status: DISCONTINUED | OUTPATIENT
Start: 2024-12-23 | End: 2024-12-23 | Stop reason: HOSPADM

## 2024-12-23 RX ORDER — SODIUM CHLORIDE 0.9 % (FLUSH) 0.9 %
10 SYRINGE (ML) INJECTION
Status: DISCONTINUED | OUTPATIENT
Start: 2024-12-23 | End: 2024-12-23 | Stop reason: HOSPADM

## 2024-12-23 RX ORDER — DIPHENHYDRAMINE HYDROCHLORIDE 50 MG/ML
50 INJECTION INTRAMUSCULAR; INTRAVENOUS ONCE AS NEEDED
Status: DISCONTINUED | OUTPATIENT
Start: 2024-12-23 | End: 2024-12-23 | Stop reason: HOSPADM

## 2024-12-23 RX ORDER — EPINEPHRINE 0.3 MG/.3ML
0.3 INJECTION SUBCUTANEOUS ONCE AS NEEDED
Status: DISCONTINUED | OUTPATIENT
Start: 2024-12-23 | End: 2024-12-23 | Stop reason: HOSPADM

## 2024-12-23 RX ORDER — DIPHENHYDRAMINE HYDROCHLORIDE 50 MG/ML
25 INJECTION INTRAMUSCULAR; INTRAVENOUS
Status: CANCELLED
Start: 2024-12-23

## 2024-12-23 RX ORDER — PROCHLORPERAZINE EDISYLATE 5 MG/ML
5 INJECTION INTRAMUSCULAR; INTRAVENOUS ONCE AS NEEDED
Status: CANCELLED | OUTPATIENT
Start: 2024-12-23

## 2024-12-23 RX ORDER — SODIUM CHLORIDE 0.9 % (FLUSH) 0.9 %
10 SYRINGE (ML) INJECTION
Status: CANCELLED | OUTPATIENT
Start: 2024-12-23

## 2024-12-23 RX ORDER — DIPHENHYDRAMINE HYDROCHLORIDE 50 MG/ML
50 INJECTION INTRAMUSCULAR; INTRAVENOUS ONCE AS NEEDED
Status: CANCELLED | OUTPATIENT
Start: 2024-12-23

## 2024-12-23 RX ORDER — EPINEPHRINE 0.3 MG/.3ML
0.3 INJECTION SUBCUTANEOUS ONCE AS NEEDED
Status: CANCELLED | OUTPATIENT
Start: 2024-12-23

## 2024-12-23 RX ORDER — HEPARIN 100 UNIT/ML
500 SYRINGE INTRAVENOUS
Status: CANCELLED | OUTPATIENT
Start: 2024-12-23

## 2024-12-23 RX ORDER — HEPARIN 100 UNIT/ML
500 SYRINGE INTRAVENOUS
Status: DISCONTINUED | OUTPATIENT
Start: 2024-12-23 | End: 2024-12-23 | Stop reason: HOSPADM

## 2024-12-23 RX ORDER — DIPHENHYDRAMINE HYDROCHLORIDE 50 MG/ML
25 INJECTION INTRAMUSCULAR; INTRAVENOUS
Status: COMPLETED | OUTPATIENT
Start: 2024-12-23 | End: 2024-12-23

## 2024-12-23 RX ADMIN — SODIUM CHLORIDE, PRESERVATIVE FREE 10 ML: 5 INJECTION INTRAVENOUS at 12:12

## 2024-12-23 RX ADMIN — DOCETAXEL 154 MG: 20 INJECTION, SOLUTION, CONCENTRATE INTRAVENOUS at 11:12

## 2024-12-23 RX ADMIN — SODIUM CHLORIDE 800 MG: 9 INJECTION, SOLUTION INTRAVENOUS at 10:12

## 2024-12-23 RX ADMIN — SODIUM CHLORIDE 20 MG: 9 INJECTION, SOLUTION INTRAVENOUS at 09:12

## 2024-12-23 RX ADMIN — SODIUM CHLORIDE: 9 INJECTION, SOLUTION INTRAVENOUS at 09:12

## 2024-12-23 RX ADMIN — HEPARIN 500 UNITS: 100 SYRINGE at 12:12

## 2024-12-23 RX ADMIN — DIPHENHYDRAMINE HYDROCHLORIDE 25 MG: 50 INJECTION INTRAMUSCULAR; INTRAVENOUS at 10:12

## 2024-12-23 NOTE — PROGRESS NOTES
Subjective:       Patient ID: Ernie Walton is a 69 y.o. male.    Chief Complaint: Review labs. Chemo     The patient location is: the [place]  The chief complaint leading to consultation is: chemo    Visit type: audiovisual    Face to Face time with patient: 10 minutes  30 minutes of total time spent on the encounter, which includes face to face time and non-face to face time preparing to see the patient (eg, review of tests), Obtaining and/or reviewing separately obtained history, Documenting clinical information in the electronic or other health record, Independently interpreting results (not separately reported) and communicating results to the patient/family/caregiver, or Care coordination (not separately reported).         Each patient to whom he or she provides medical services by telemedicine is:  (1) informed of the relationship between the physician and patient and the respective role of any other health care provider with respect to management of the patient; and (2) notified that he or she may decline to receive medical services by telemedicine and may withdraw from such care at any time.    Notes:       HPI: 69 y.o. male with systemic sclerosis/scleroderma (dx'd 2020, on mycophenolate mofetil, prednisone, and nintendinab), HTN, HLD, BPH, h/o nephrolithiasis w/ recurrent UTI, and DDD who presents for f/u of the below lung cancer. His primary oncologist has been Dr. Erik Clay, and he has established care with Dr. Holley ([facility]).     1)  Stage IVB (T4B7S4o) adenocarcinoma of the LLL (PD-L1 TPS 1%, no targetable mutations, STK11/TP53/KEAP1 co-mutations) w/ diffuse osseous disease , initially dx'd 5/15/24, s/p carboplatin/paclitaxel (6/5/24 - 9/17/24), and on supportive denosumab (last dose 9/17/24). Planning to switch to Zometa pending dental clearance. Dental work planned 1/2025 11/25/24: CT C/A/P with contrast---1.  Spiculated pulmonary nodule in the superior segment left lower lobe measuring 2.9 x  2.0 cm, presumably the patient's primary malignancy. 2. Metastatic left hilar lymphadenopathy. 3. Extensive osseous metastatic disease as detailed above. 4. No soft tissue metastatic disease in the abdomen or pelvis.    Today he presents for lab review and consideration of C1D1 Taxotere/Cyramza. Per discussions with patient's Rheumatologist and Dr. Bailey it was decided to hold immunotherapy. Patient notes feeling well overall today. Has been taking CBD supplement and notes resolution of N/V and increased appetite.   Social History     Socioeconomic History    Marital status:    Tobacco Use    Smoking status: Never     Passive exposure: Never    Smokeless tobacco: Never     Social Drivers of Health     Financial Resource Strain: Low Risk  (11/4/2024)    Overall Financial Resource Strain (CARDIA)     Difficulty of Paying Living Expenses: Not very hard   Food Insecurity: No Food Insecurity (11/4/2024)    Hunger Vital Sign     Worried About Running Out of Food in the Last Year: Never true     Ran Out of Food in the Last Year: Never true   Physical Activity: Inactive (11/4/2024)    Exercise Vital Sign     Days of Exercise per Week: 0 days     Minutes of Exercise per Session: 0 min   Stress: Stress Concern Present (11/4/2024)    [employer] [place] [employer] - Occupational Stress Questionnaire     Feeling of Stress : To some extent   Housing Stability: Unknown (11/4/2024)    Housing Stability Vital Sign     Unable to Pay for Housing in the Last Year: No       No past medical history on file.    No family history on file.    No past surgical history on file.    Review of Systems   Constitutional:  Positive for appetite change. Negative for chills, fatigue, fever and unexpected weight change.   HENT:  Negative for congestion, mouth sores, nosebleeds, sore throat, trouble swallowing and voice change.    Respiratory:  Negative for cough, chest tightness, shortness of breath and wheezing.    Cardiovascular:   Negative for chest pain and leg swelling.   Gastrointestinal:  Negative for abdominal distention, abdominal pain, blood in stool, constipation, diarrhea, nausea and vomiting.   Genitourinary:  Negative for difficulty urinating, dysuria and hematuria.   Musculoskeletal:  Negative for arthralgias, back pain and myalgias.   Skin:  Negative for pallor, rash and wound.   Neurological:  Negative for dizziness, syncope, weakness and headaches.   Hematological:  Negative for adenopathy. Does not bruise/bleed easily.   Psychiatric/Behavioral:  The patient is nervous/anxious.        Medication List with Changes/Refills   Current Medications    AMLODIPINE (NORVASC) 5 MG TABLET    Take 1 tablet by mouth once daily.    ATORVASTATIN (LIPITOR) 40 MG TABLET    Take 1 tablet by mouth once daily.    DULOXETINE (CYMBALTA) 30 MG CAPSULE    Take 1 capsule (30 mg total) by mouth once daily for 7 days, THEN 2 capsules (60 mg total) once daily.    FERROUS SULFATE (FEOSOL) 325 MG (65 MG IRON) TAB TABLET    Take 325 mg by mouth with breakfast.    FLUTICASONE PROPIONATE (FLONASE) 50 MCG/ACTUATION NASAL SPRAY    1 spray by Each Nostril route once daily.    FOLIC ACID, BULK, 100 % POWD    Take 666 mcg by mouth.    IBUPROFEN (ADVIL,MOTRIN) 800 MG TABLET    Take 1 tablet (800 mg total) by mouth every 8 (eight) hours as needed for Pain.    LACTOBACILLUS RHAMNOSUS GG (CULTURELLE) 10 BILLION CELL CAPSULE    Take 1 capsule by mouth every morning.    LIDOCAINE 5 % CREA    SMARTSIG:Sparingly Topical 3 Times Daily    LISINOPRIL 10 MG TABLET    Take 2 tablets by mouth once daily.    MUPIROCIN (BACTROBAN) 2 % OINTMENT    Apply 1 Application topically.    MYCOPHENOLATE SODIUM ORAL    Take 1,500 mg by mouth.    NINTEDANIB (OFEV) 100 MG CAP    Take 100 mg by mouth 2 (two) times daily.    OMEGA 3-DHA-EPA-FISH OIL 1,000 MG (120 MG-180 MG) CAP    Take 1,000 mg by mouth.    ONDANSETRON (ZOFRAN-ODT) 4 MG TBDL    Take 4 mg by mouth.    PANTOPRAZOLE (PROTONIX) 40  MG TABLET    Take 40 mg by mouth.    PHENAZOPYRIDINE (PYRIDIUM) 200 MG TABLET    Take 200 mg by mouth 3 (three) times daily as needed.    PREDNISONE, BULK, POWD    Take 7.5 mg by mouth.    PREGABALIN (LYRICA) 75 MG CAPSULE    Take 75 mg by mouth 2 (two) times daily.    RALOXIFENE (EVISTA) 60 MG TABLET    Take 1 tablet by mouth once daily.    SILDENAFIL (REVATIO) 20 MG TAB    Take 1 tablet by mouth 3 (three) times daily.    TAMSULOSIN (FLOMAX) 0.4 MG CAP    Take 1 capsule by mouth once daily.    TRAMADOL (ULTRAM) 50 MG TABLET    Take 1 tablet by mouth every 6 (six) hours as needed.    VITAMIN D (VITAMIN D3) 1000 UNITS TAB    Take by mouth once daily.    VITAMIN E 100 UNIT/0.25 ML DROP    Take 180 Units by mouth.    VITAMIN E, DL, ACETATE, 90 MG (200 UNIT) CAP    Take 1 capsule by mouth every morning.     Objective:   There were no vitals filed for this visit.    Physical Exam  Pulmonary:      Effort: Pulmonary effort is normal. No respiratory distress.   Neurological:      Mental Status: He is alert and oriented to person, place, and time.        Assessment:     Problem List Items Addressed This Visit          Oncology    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 - Primary      Cancer Staging   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  Staging form: Lung, AJCC 8th Edition  - Clinical: Stage IVB (cT2, cN1, pM1c) - Signed by Rudi Granados IV, MD on 7/8/2024    Labs reviewed stable. Note anemia. Will r/o nutritional deficiencies. K+3.4. encouraged potassium rich foods. He notes recently taking CBD supplement with increased appetite and resolution of N/V    Proceed with C1D1 Taxotere/Cyramza. F/u 7-10 days for labs/tolerance check. F/u 3 weeks with labs for consideration of C2D1 Taxotere/Cyramza         Relevant Orders    Urinalysis     Other Visit Diagnoses       Anemia in neoplastic disease        Relevant Orders    Folate    Ferritin    Iron and TIBC    Vitamin B12    Nutritional anemia, unspecified         Relevant Orders    Folate    Vitamin B12              Plan:     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  -     Urinalysis; Future; Expected date: 12/23/2024    Anemia in neoplastic disease  -     Folate; Future; Expected date: 12/23/2024  -     Ferritin; Future; Expected date: 12/23/2024  -     Iron and TIBC; Future; Expected date: 12/23/2024  -     Vitamin B12; Future; Expected date: 12/23/2024    Nutritional anemia, unspecified  -     Folate; Future; Expected date: 12/23/2024  -     Vitamin B12; Future; Expected date: 12/23/2024          Med Onc Chart Routing      Follow up with physician 3 weeks. Dr. Bailey cbc/cmp/mag/UA  chemo   Follow up with ADRIANNA Other. 7-10 days cbc cmp mag   Infusion scheduling note   Taxol/cyramza 3 weeks   Injection scheduling note    Labs CBC, CMP, magnesium and urinalysis   Scheduling:  Preferred lab:  Lab interval:     Imaging None      Pharmacy appointment No pharmacy appointment needed      Other referrals       No additional referrals needed           CLARITZA Nino

## 2024-12-23 NOTE — PLAN OF CARE
Pt tolerated all well. Will rtc in 1 wk. For lab check with np.  Pt instructed to call/contact office with any concerns at any time and aware of when to seek urgent/emergent care.

## 2024-12-23 NOTE — ASSESSMENT & PLAN NOTE
Cancer Staging   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  Staging form: Lung, AJCC 8th Edition  - Clinical: Stage IVB (cT2, cN1, pM1c) - Signed by Rudi Granados IV, MD on 7/8/2024    Labs reviewed stable. Note anemia. Will r/o nutritional deficiencies. K+3.4. encouraged potassium rich foods. He notes recently taking CBD supplement with increased appetite and resolution of N/V    Proceed with C1D1 Taxotere/Cyramza. F/u 7-10 days for labs/tolerance check. F/u 3 weeks with labs for consideration of C2D1 Taxotere/Cyramza

## 2024-12-26 ENCOUNTER — TELEPHONE (OUTPATIENT)
Dept: HEMATOLOGY/ONCOLOGY | Facility: CLINIC | Age: 69
End: 2024-12-26
Payer: MEDICARE

## 2024-12-26 NOTE — TELEPHONE ENCOUNTER
"Call placed to check in on pt post infusion, spoke to pt who reports he is doing well overall. He reports poor sleep the past 2 nights however reports that is his normal effects post chemo and he is not concerned. He reports fair oral intake and normal BMs with no other symptoms to report. Pt denies any further needs at this time though I encouraged him to reach out with any need/concerns. Reviewed upcoming appts with pt VU.     Oncology Navigation   Intake      Treatment  Current Status: Active       Medical Oncologist: Darwin  Chemotherapy: Planned  Chemotherapy Regimen: plan change to Docetaxel  Immunotherapy: Planned  Immunotherapy Name: ramucirumab  Start Date: 24                   Support Systems: Spouse/significant other; Family members  Barriers of Care: Barriers to Care "Assessment completed-no barriers noted"     Acuity  Stage: 2  Systemic Treatment - predicted or initiated: Chemotherapy Regimen with Multiple drugs (+1)  Treatment Tolerability: Minimal symptoms  ECO  Comorbidities in Medical History: 0  Hospitalization Within the Past Month: 0   Needed: 0  Support: 0  Verbalizes Financial Concerns: 0  Transportation: 0  History of noncompliance/frequent no shows and cancellations: 0  Verbalizes the need for more education: 0  Navigation Acuity: 2     Follow Up  No follow-ups on file.       "

## 2025-01-02 ENCOUNTER — OFFICE VISIT (OUTPATIENT)
Dept: HEMATOLOGY/ONCOLOGY | Facility: CLINIC | Age: 70
End: 2025-01-02
Payer: MEDICARE

## 2025-01-02 ENCOUNTER — DOCUMENTATION ONLY (OUTPATIENT)
Dept: HEMATOLOGY/ONCOLOGY | Facility: CLINIC | Age: 70
End: 2025-01-02
Payer: MEDICARE

## 2025-01-02 ENCOUNTER — LAB VISIT (OUTPATIENT)
Dept: LAB | Facility: HOSPITAL | Age: 70
End: 2025-01-02
Attending: HOSPITALIST
Payer: MEDICARE

## 2025-01-02 VITALS
HEART RATE: 123 BPM | BODY MASS INDEX: 24.61 KG/M2 | DIASTOLIC BLOOD PRESSURE: 73 MMHG | HEIGHT: 71 IN | TEMPERATURE: 98 F | SYSTOLIC BLOOD PRESSURE: 112 MMHG | OXYGEN SATURATION: 96 % | WEIGHT: 175.81 LBS

## 2025-01-02 DIAGNOSIS — C34.32 ADENOCARCINOMA OF LOWER LOBE OF LEFT LUNG: ICD-10-CM

## 2025-01-02 DIAGNOSIS — K12.30 MUCOSITIS: Primary | ICD-10-CM

## 2025-01-02 LAB
ALBUMIN SERPL BCP-MCNC: 2.6 G/DL (ref 3.5–5.2)
ALP SERPL-CCNC: 75 U/L (ref 40–150)
ALT SERPL W/O P-5'-P-CCNC: 16 U/L (ref 10–44)
ANION GAP SERPL CALC-SCNC: 12 MMOL/L (ref 8–16)
AST SERPL-CCNC: 18 U/L (ref 10–40)
BILIRUB SERPL-MCNC: 0.3 MG/DL (ref 0.1–1)
BUN SERPL-MCNC: 8 MG/DL (ref 8–23)
CALCIUM SERPL-MCNC: 9.7 MG/DL (ref 8.7–10.5)
CHLORIDE SERPL-SCNC: 102 MMOL/L (ref 95–110)
CO2 SERPL-SCNC: 25 MMOL/L (ref 23–29)
CREAT SERPL-MCNC: 0.8 MG/DL (ref 0.5–1.4)
ERYTHROCYTE [DISTWIDTH] IN BLOOD BY AUTOMATED COUNT: 16.5 % (ref 11.5–14.5)
EST. GFR  (NO RACE VARIABLE): >60 ML/MIN/1.73 M^2
GLUCOSE SERPL-MCNC: 134 MG/DL (ref 70–110)
HCT VFR BLD AUTO: 30.2 % (ref 40–54)
HGB BLD-MCNC: 8.9 G/DL (ref 14–18)
IMM GRANULOCYTES # BLD AUTO: 0.01 K/UL (ref 0–0.04)
MAGNESIUM SERPL-MCNC: 1.5 MG/DL (ref 1.6–2.6)
MCH RBC QN AUTO: 26.8 PG (ref 27–31)
MCHC RBC AUTO-ENTMCNC: 29.5 G/DL (ref 32–36)
MCV RBC AUTO: 91 FL (ref 82–98)
NEUTROPHILS # BLD AUTO: 1.1 K/UL (ref 1.8–7.7)
PLATELET # BLD AUTO: 320 K/UL (ref 150–450)
PMV BLD AUTO: 10.1 FL (ref 9.2–12.9)
POTASSIUM SERPL-SCNC: 3.3 MMOL/L (ref 3.5–5.1)
PROT SERPL-MCNC: 7.9 G/DL (ref 6–8.4)
RBC # BLD AUTO: 3.32 M/UL (ref 4.6–6.2)
SODIUM SERPL-SCNC: 139 MMOL/L (ref 136–145)
WBC # BLD AUTO: 3.05 K/UL (ref 3.9–12.7)

## 2025-01-02 PROCEDURE — 36415 COLL VENOUS BLD VENIPUNCTURE: CPT | Performed by: HOSPITALIST

## 2025-01-02 PROCEDURE — 80053 COMPREHEN METABOLIC PANEL: CPT | Performed by: HOSPITALIST

## 2025-01-02 PROCEDURE — 85027 COMPLETE CBC AUTOMATED: CPT | Performed by: HOSPITALIST

## 2025-01-02 PROCEDURE — 99999 PR PBB SHADOW E&M-EST. PATIENT-LVL III: CPT | Mod: PBBFAC,,, | Performed by: NURSE PRACTITIONER

## 2025-01-02 PROCEDURE — 99213 OFFICE O/P EST LOW 20 MIN: CPT | Mod: PBBFAC | Performed by: NURSE PRACTITIONER

## 2025-01-02 PROCEDURE — 99214 OFFICE O/P EST MOD 30 MIN: CPT | Mod: S$PBB,,, | Performed by: NURSE PRACTITIONER

## 2025-01-02 PROCEDURE — 83735 ASSAY OF MAGNESIUM: CPT | Performed by: HOSPITALIST

## 2025-01-02 RX ORDER — CYANOCOBALAMIN 1000 UG/ML
1000 INJECTION, SOLUTION INTRAMUSCULAR; SUBCUTANEOUS
COMMUNITY
Start: 2024-12-18

## 2025-01-02 NOTE — PROGRESS NOTES
CATHERINE provided pt with 1 case of chocolate glucose control Boost per provider/pt request. No other needs expressed. SW provided pt with contact info to call CATHERINE if future needs arise. SW will remain available.

## 2025-01-02 NOTE — ASSESSMENT & PLAN NOTE
Cancer Staging   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  Staging form: Lung, AJCC 8th Edition  - Clinical: Stage IVB (cT2, cN1, pM1c) - Signed by Rudi Granados IV, MD on 7/8/2024    Labs reviewed overall stable. Anemia unchanged from prior. Note K+ 3.3. Mag 1.5. Saturated iron 12%. He notes tolerating cycle 1 well overall but did develop mouth sores/tenderness improving. He notes fatigue    Patient taking OTC potassium/magnesium/iron supplement. Ok to continue    Encouraged healthy nutrition and adequate PO Hydration daily. Trial magic mouthwash. F/u as previously scheduled for cycle 2 chemotherapy     Note interval weight loss. He admits to limited appetite the few days following chemo but notes improvement in appetite. Boost given to patient. Monitor

## 2025-01-02 NOTE — PROGRESS NOTES
Subjective:       Patient ID: Ernie Walton is a 69 y.o. male.    Chief Complaint: Review labs. S/p Chemo f/u. Symptom/tolerance check     Cancer Staging   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  Staging form: Lung, AJCC 8th Edition  - Clinical: Stage IVB (cT2, cN1, pM1c) - Signed by Rudi Granados IV, MD on 7/8/2024    HPI: 69 y.o. male with systemic sclerosis/scleroderma (dx'd 2020, on mycophenolate mofetil, prednisone, and nintendinab), HTN, HLD, BPH, h/o nephrolithiasis w/ recurrent UTI, and DDD who presents for f/u of the below lung cancer. His primary oncologist has been Dr. Erik Clay, and he has established care with Dr. Holley ([facility]).     1)  Stage IVB (L4A0C3a) adenocarcinoma of the LLL (PD-L1 TPS 1%, no targetable mutations, STK11/TP53/KEAP1 co-mutations) w/ diffuse osseous disease , initially dx'd 5/15/24, s/p carboplatin/paclitaxel (6/5/24 - 9/17/24), and on supportive denosumab (last dose 9/17/24). Planning to switch to Zometa pending dental clearance. Dental work planned 1/2025 11/25/24: CT C/A/P with contrast---1.  Spiculated pulmonary nodule in the superior segment left lower lobe measuring 2.9 x 2.0 cm, presumably the patient's primary malignancy. 2. Metastatic left hilar lymphadenopathy. 3. Extensive osseous metastatic disease as detailed above. 4. No soft tissue metastatic disease in the abdomen or pelvis.    S/p C1D1 Taxotere/Cyramza 12/23/2024.    Presents today for symptom and tolerance check. He notes feeling better overall. He endorses fatigue and weakness predating chemotherapy. Feels fatigue somewhat better. He notes loss of appetite few days following chemotherapy but feels improving. He developed mouth sores and tenderness now improved with peroxide mouth washes. He has lost about 7 pounds but notes increasing appetite. Denies N/V/D, constipation, fever, or any other concerns.     Oncology History   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   5/15/2024 Imaging Significant  Findings    CT C, Non-Con (f/u after imaging for scleroderma clinical trial workup)  - 2.5 x 2.4 x 1.8 cm LLL nodule (previously 2.2 x 2.2 x 1.8 cm, 3/25/24)  - Patchy ground-glass and nodular opacities adjacent to LLL nodule  - 1.4 cm left hilar lymph node  - Right lower paratracheal lymphadenopathy  - Lytic lesion with erosive changes in T2 vertebral body, new lytic lesions in manubrium, right fourth rib, T3, T7, and T11 vertebral bodies with pathologic fracture of superior endplate of T3 vertebral body     5/15/2024 Procedure    Bronch w/ EBUS  LLL TBBx  - Adenocarcinoma (Positive: TTF-1; Negative: p40)    LLL, Core Biopsy  - Adenocarcinoma    LN  - Positive: Station 11L  - Negative: Station 4R, 7, 4L    Caris NGS / PD-L1      - KEAP1, TP53, STK11 mutated  - ERBB2 negative / 1+  - TMB 7  - TORIE       6/5/2024 Imaging Significant Findings    PET CT  - 2.2 cm LLL mass, SUV 4.8  - 1.6 cm left hilar node, SUV 6.8  - 1.3 cm right lower paratracheal node, SUV 2.2  - Hypermetabolic lytic lesions in multiple bones (C7 transverse process, multiple thoracic, lumbar, and sacral segments, sternum, right scapular body, left scapular angle, several ribs, left ilium, left pubic body, bilateral ischia)     7/5/2024 Imaging Significant Findings    MRI Brain  - LANNY in brain or dura  - Mild enhancement within clivus, cannot rule out bony met     7/8/2024 Initial Diagnosis    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     7/8/2024 Cancer Staged    Staging form: Lung, AJCC 8th Edition  - Clinical: Stage IVB (cT2, cN1, pM1c)     8/9/2024 Imaging Significant Findings    MRI C/T/L Spine  Diffuse osseous metastasis throughout the visualized spine without any evidence of significant compression fracture or retropulsion with involvement of the vertebral bodies and posterior elements at multiple levels.   No evidence of cervical/thoracic spinal cord compression with impingement of the ventral aspect of the cord from adjacent disc disease at  multiple levels as discussed above.   Discal disease with foraminal stenosis at multiple levels throughout the spine with most prominent involvement seen at L4-5 and L5-S1 levels mostly degenerative in nature.   No abnormal intraspinal enhancement with no epidural enhancement noted        9/27/2024 Imaging Significant Findings    PET CT (compared against PET CT from 6/5/24)  1.4 x 1.0 cm LLL nodule (previously 2.0 x 1.3 cm), SUV 6.8 (similar)  New fairly defined opacity adjacent to the primary mass measuring up 1.8. The activity overlying the primary tumor is now confluent with the new opacity, leading to increased extent of activity.   1.2 x 2.0 cm L hilar LN (previously 1.4 x 2.2 cm), SUV 5.2  Numerous FDG-avid bone metastases are noted. Although previously visualized lesions show some areas of improvement, there are multiple new active bone metastases (L4, T8). The pattern suggests initial response to treatment with subsequent progressive disease      11/25/2024 Imaging Significant Findings    11/25/24 -- CT C/A/P  2.9 x 2.0 cm spiculated LLL pulmonary nodule  2.2 x 1.9 cm left hilar lymph node  Dependent pulmonary infiltrates  Extensive osseous metastatic disease (scattered lytic and sclerotic lesions throughout thoracic spine, manubrium, left scapula, pelvis, sacrum, and lumbar spine)     12/23/2024 -  Chemotherapy    Treatment Summary   Plan Name: OP NSCLC ramucirumab DOCEtaxel Q3W   Treatment Goal: Palliative  Status: Active  Start Date: 12/23/2024  End Date: 8/4/2025 (Planned)  Provider: Rudi Granados IV, MD  Chemotherapy: DOCEtaxel (TAXOTERE) 75 mg/m2 = 154 mg in 0.9% NaCl 292.7 mL chemo infusion, 75 mg/m2 = 154 mg, Intravenous, Clinic/HOD 1 time, 1 of 12 cycles  Administration: 154 mg (12/23/2024)  ramucirumab (CYRAMZA) 800 mg in 0.9% NaCl 250 mL chemo infusion, 830 mg, Intravenous, Clinic/HOD 1 time, 1 of 12 cycles  Administration: 800 mg (12/23/2024)         Social History     Socioeconomic History     Marital status:    Tobacco Use    Smoking status: Never     Passive exposure: Never    Smokeless tobacco: Never     Social Drivers of Health     Financial Resource Strain: Low Risk  (11/4/2024)    Overall Financial Resource Strain (CARDIA)     Difficulty of Paying Living Expenses: Not very hard   Food Insecurity: No Food Insecurity (11/4/2024)    Hunger Vital Sign     Worried About Running Out of Food in the Last Year: Never true     Ran Out of Food in the Last Year: Never true   Physical Activity: Inactive (11/4/2024)    Exercise Vital Sign     Days of Exercise per Week: 0 days     Minutes of Exercise per Session: 0 min   Stress: Stress Concern Present (11/4/2024)    Citizen of the [place] [employer] - Occupational Stress Questionnaire     Feeling of Stress : To some extent   Housing Stability: Unknown (11/4/2024)    Housing Stability Vital Sign     Unable to Pay for Housing in the Last Year: No       No past medical history on file.    No family history on file.    No past surgical history on file.    Review of Systems   Constitutional:  Positive for appetite change and fatigue. Negative for chills, fever and unexpected weight change.   HENT:  Negative for congestion, mouth sores, nosebleeds, sore throat, trouble swallowing and voice change.    Respiratory:  Negative for cough, chest tightness, shortness of breath and wheezing.    Cardiovascular:  Negative for chest pain and leg swelling.   Gastrointestinal:  Negative for abdominal distention, abdominal pain, blood in stool, constipation, diarrhea, nausea and vomiting.   Genitourinary:  Negative for difficulty urinating, dysuria and hematuria.   Musculoskeletal:  Negative for arthralgias, back pain and myalgias.   Skin:  Negative for pallor, rash and wound.   Neurological:  Positive for weakness. Negative for dizziness, syncope and headaches.   Hematological:  Negative for adenopathy. Does not bruise/bleed easily.   Psychiatric/Behavioral:  The  patient is nervous/anxious.        Medication List with Changes/Refills   New Medications    DIPHENHYDRAMINE-ALUMINUM-MAGNESIUM HYDROXIDE-SIMETHICONE-LIDOCAINE VISCOUS HCL 2%    Swish and spit 15 mLs every 4 (four) hours as needed.    MAGIC MOUTHWASH DIPHEN/ANTAC/LIDOC    Swish and spit 15 mLs every 4 (four) hours as needed.   Current Medications    AMLODIPINE (NORVASC) 5 MG TABLET    Take 1 tablet by mouth once daily.    ATORVASTATIN (LIPITOR) 40 MG TABLET    Take 1 tablet by mouth once daily.    CYANOCOBALAMIN 1,000 MCG/ML INJECTION    Inject 1,000 mcg into the muscle every 30 days.    DULOXETINE (CYMBALTA) 30 MG CAPSULE    Take 1 capsule (30 mg total) by mouth once daily for 7 days, THEN 2 capsules (60 mg total) once daily.    FERROUS SULFATE (FEOSOL) 325 MG (65 MG IRON) TAB TABLET    Take 325 mg by mouth with breakfast.    FLUTICASONE PROPIONATE (FLONASE) 50 MCG/ACTUATION NASAL SPRAY    1 spray by Each Nostril route once daily.    FOLIC ACID, BULK, 100 % POWD    Take 666 mcg by mouth.    IBUPROFEN (ADVIL,MOTRIN) 800 MG TABLET    Take 1 tablet (800 mg total) by mouth every 8 (eight) hours as needed for Pain.    LACTOBACILLUS RHAMNOSUS GG (CULTURELLE) 10 BILLION CELL CAPSULE    Take 1 capsule by mouth every morning.    LIDOCAINE 5 % CREA    SMARTSIG:Sparingly Topical 3 Times Daily    LISINOPRIL 10 MG TABLET    Take 2 tablets by mouth once daily.    MUPIROCIN (BACTROBAN) 2 % OINTMENT    Apply 1 Application topically.    MYCOPHENOLATE SODIUM ORAL    Take 1,500 mg by mouth.    NINTEDANIB (OFEV) 100 MG CAP    Take 100 mg by mouth 2 (two) times daily.    OMEGA 3-DHA-EPA-FISH OIL 1,000 MG (120 MG-180 MG) CAP    Take 1,000 mg by mouth.    ONDANSETRON (ZOFRAN-ODT) 4 MG TBDL    Take 4 mg by mouth.    PANTOPRAZOLE (PROTONIX) 40 MG TABLET    Take 40 mg by mouth.    PHENAZOPYRIDINE (PYRIDIUM) 200 MG TABLET    Take 200 mg by mouth 3 (three) times daily as needed.    PREDNISONE, BULK, POWD    Take 7.5 mg by mouth.    PREGABALIN  (LYRICA) 75 MG CAPSULE    Take 75 mg by mouth 2 (two) times daily.    RALOXIFENE (EVISTA) 60 MG TABLET    Take 1 tablet by mouth once daily.    SILDENAFIL (REVATIO) 20 MG TAB    Take 1 tablet by mouth 3 (three) times daily.    TAMSULOSIN (FLOMAX) 0.4 MG CAP    Take 1 capsule by mouth once daily.    TRAMADOL (ULTRAM) 50 MG TABLET    Take 1 tablet by mouth every 6 (six) hours as needed.    VITAMIN D (VITAMIN D3) 1000 UNITS TAB    Take by mouth once daily.    VITAMIN E 100 UNIT/0.25 ML DROP    Take 180 Units by mouth.    VITAMIN E, DL, ACETATE, 90 MG (200 UNIT) CAP    Take 1 capsule by mouth every morning.     Objective:     Vitals:    01/02/25 0948   BP: 112/73   Pulse: (!) 123   Temp: 97.9 °F (36.6 °C)     Lab Results   Component Value Date    WBC 3.05 (L) 01/02/2025    HGB 8.9 (L) 01/02/2025    HCT 30.2 (L) 01/02/2025    MCV 91 01/02/2025     01/02/2025       BMP  Lab Results   Component Value Date     01/02/2025    K 3.3 (L) 01/02/2025     01/02/2025    CO2 25 01/02/2025    BUN 8 01/02/2025    CREATININE 0.8 01/02/2025    CALCIUM 9.7 01/02/2025    ANIONGAP 12 01/02/2025    EGFRNORACEVR >60 01/02/2025     Lab Results   Component Value Date    ALT 16 01/02/2025    AST 18 01/02/2025    ALKPHOS 75 01/02/2025    BILITOT 0.3 01/02/2025         Physical Exam  HENT:      Right Ear: External ear normal.      Left Ear: External ear normal.   Cardiovascular:      Rate and Rhythm: Tachycardia present.   Pulmonary:      Effort: Pulmonary effort is normal. No respiratory distress.      Breath sounds: Normal breath sounds.   Abdominal:      General: There is no distension.   Musculoskeletal:         General: Normal range of motion.   Neurological:      Mental Status: He is alert and oriented to person, place, and time.        Assessment:     Problem List Items Addressed This Visit          Oncology    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      Cancer Staging   Adenocarcinoma of lower lobe of left  lung  Staging form: Lung, AJCC 8th Edition  - Clinical: Stage IVB (cT2, cN1, pM1c) - Signed by Rudi Granados IV, MD on 7/8/2024    Labs reviewed overall stable. Anemia unchanged from prior. Note K+ 3.3. Mag 1.5. Saturated iron 12%. He notes tolerating cycle 1 well overall but did develop mouth sores/tenderness improving. He notes fatigue    Patient taking OTC potassium/magnesium/iron supplement. Ok to continue    Encouraged healthy nutrition and adequate PO Hydration daily. Trial magic mouthwash. F/u as previously scheduled for cycle 2 chemotherapy     Note interval weight loss. He admits to limited appetite the few days following chemo but notes improvement in appetite. Boost given to patient. Monitor           Other Visit Diagnoses       Mucositis    -  Primary    Relevant Medications    diphenhydrAMINE-aluminum-magnesium hydroxide-simethicone-LIDOcaine viscous HCl 2%              Plan:     Mucositis  -     diphenhydrAMINE-aluminum-magnesium hydroxide-simethicone-LIDOcaine viscous HCl 2%; Swish and spit 15 mLs every 4 (four) hours as needed.  Dispense: 1 each; Refill: 2    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          Med Onc Chart Routing      Follow up with physician    Follow up with ADRIANNA . Already scheduled   Infusion scheduling note    Injection scheduling note    Labs    Imaging None      Pharmacy appointment No pharmacy appointment needed      Other referrals       No additional referrals needed           CLARITZA Nino

## 2025-01-03 ENCOUNTER — TELEPHONE (OUTPATIENT)
Dept: NUTRITION | Facility: CLINIC | Age: 70
End: 2025-01-03
Payer: MEDICARE

## 2025-01-03 ENCOUNTER — PATIENT MESSAGE (OUTPATIENT)
Dept: NUTRITION | Facility: CLINIC | Age: 70
End: 2025-01-03
Payer: MEDICARE

## 2025-01-03 NOTE — TELEPHONE ENCOUNTER
Introduced myself to new oncology patient over the phone. Gave my phone number along with guidance on when to contact me about an appointment, for concerns like significant loss of appetite and weight loss.   Signature: Laura Shell, MPH, RD, LDN

## 2025-01-09 ENCOUNTER — TELEPHONE (OUTPATIENT)
Dept: HEMATOLOGY/ONCOLOGY | Facility: CLINIC | Age: 70
End: 2025-01-09
Payer: MEDICARE

## 2025-01-09 DIAGNOSIS — C34.32 ADENOCARCINOMA OF LOWER LOBE OF LEFT LUNG: ICD-10-CM

## 2025-01-09 DIAGNOSIS — M54.59 OTHER LOW BACK PAIN: Primary | ICD-10-CM

## 2025-01-09 NOTE — NURSING
Discussed Integrative Oncology referral including program goals, objectives and benefits to patient's clinical history. Reviewed the role of our ancillary support and group classes. Educated patient regarding acupuncture benefit. Desires to proceed. Virtual visit with Dr. Malik confirmed with access verified 2/14/25 at 1300, HELENA Nick.

## 2025-01-13 ENCOUNTER — OFFICE VISIT (OUTPATIENT)
Dept: HEMATOLOGY/ONCOLOGY | Facility: CLINIC | Age: 70
End: 2025-01-13
Payer: MEDICARE

## 2025-01-13 ENCOUNTER — INFUSION (OUTPATIENT)
Dept: INFUSION THERAPY | Facility: HOSPITAL | Age: 70
End: 2025-01-13
Attending: HOSPITALIST
Payer: MEDICARE

## 2025-01-13 ENCOUNTER — CLINICAL SUPPORT (OUTPATIENT)
Facility: HOSPITAL | Age: 70
End: 2025-01-13
Attending: INTERNAL MEDICINE
Payer: MEDICARE

## 2025-01-13 VITALS
HEIGHT: 71 IN | SYSTOLIC BLOOD PRESSURE: 106 MMHG | HEART RATE: 92 BPM | TEMPERATURE: 97 F | WEIGHT: 179.44 LBS | BODY MASS INDEX: 25.12 KG/M2 | OXYGEN SATURATION: 95 % | DIASTOLIC BLOOD PRESSURE: 68 MMHG | RESPIRATION RATE: 16 BRPM

## 2025-01-13 DIAGNOSIS — C34.32 ADENOCARCINOMA OF LOWER LOBE OF LEFT LUNG: Primary | ICD-10-CM

## 2025-01-13 DIAGNOSIS — M54.59 OTHER LOW BACK PAIN: Primary | ICD-10-CM

## 2025-01-13 DIAGNOSIS — C79.51 METASTASIS TO BONE: ICD-10-CM

## 2025-01-13 PROCEDURE — 97813 ACUP 1/> W/ESTIM 1ST 15 MIN: CPT

## 2025-01-13 PROCEDURE — 97814 ACUP 1/> W/ESTIM EA ADDL 15: CPT

## 2025-01-13 PROCEDURE — 96367 TX/PROPH/DG ADDL SEQ IV INF: CPT

## 2025-01-13 PROCEDURE — 96417 CHEMO IV INFUS EACH ADDL SEQ: CPT

## 2025-01-13 PROCEDURE — A4216 STERILE WATER/SALINE, 10 ML: HCPCS | Performed by: NURSE PRACTITIONER

## 2025-01-13 PROCEDURE — 63600175 PHARM REV CODE 636 W HCPCS: Performed by: NURSE PRACTITIONER

## 2025-01-13 PROCEDURE — 25000003 PHARM REV CODE 250: Performed by: NURSE PRACTITIONER

## 2025-01-13 PROCEDURE — 96375 TX/PRO/DX INJ NEW DRUG ADDON: CPT

## 2025-01-13 PROCEDURE — 96413 CHEMO IV INFUSION 1 HR: CPT

## 2025-01-13 PROCEDURE — 98007 SYNCH AUDIO-VIDEO EST HI 40: CPT | Mod: 95,,, | Performed by: NURSE PRACTITIONER

## 2025-01-13 RX ORDER — SODIUM CHLORIDE 0.9 % (FLUSH) 0.9 %
10 SYRINGE (ML) INJECTION
Status: DISCONTINUED | OUTPATIENT
Start: 2025-01-13 | End: 2025-01-13 | Stop reason: HOSPADM

## 2025-01-13 RX ORDER — SODIUM CHLORIDE 0.9 % (FLUSH) 0.9 %
10 SYRINGE (ML) INJECTION
Status: CANCELLED | OUTPATIENT
Start: 2025-01-13

## 2025-01-13 RX ORDER — HEPARIN 100 UNIT/ML
500 SYRINGE INTRAVENOUS
Status: CANCELLED | OUTPATIENT
Start: 2025-01-13

## 2025-01-13 RX ORDER — DIPHENHYDRAMINE HYDROCHLORIDE 50 MG/ML
50 INJECTION INTRAMUSCULAR; INTRAVENOUS ONCE AS NEEDED
Status: CANCELLED | OUTPATIENT
Start: 2025-01-13

## 2025-01-13 RX ORDER — HEPARIN 100 UNIT/ML
500 SYRINGE INTRAVENOUS
Status: DISCONTINUED | OUTPATIENT
Start: 2025-01-13 | End: 2025-01-13 | Stop reason: HOSPADM

## 2025-01-13 RX ORDER — PROCHLORPERAZINE EDISYLATE 5 MG/ML
5 INJECTION INTRAMUSCULAR; INTRAVENOUS ONCE AS NEEDED
Status: DISCONTINUED | OUTPATIENT
Start: 2025-01-13 | End: 2025-01-13 | Stop reason: HOSPADM

## 2025-01-13 RX ORDER — DIPHENHYDRAMINE HYDROCHLORIDE 50 MG/ML
25 INJECTION INTRAMUSCULAR; INTRAVENOUS
Status: CANCELLED
Start: 2025-01-13

## 2025-01-13 RX ORDER — DIPHENHYDRAMINE HYDROCHLORIDE 50 MG/ML
25 INJECTION INTRAMUSCULAR; INTRAVENOUS
Status: COMPLETED | OUTPATIENT
Start: 2025-01-13 | End: 2025-01-13

## 2025-01-13 RX ORDER — EPINEPHRINE 0.3 MG/.3ML
0.3 INJECTION SUBCUTANEOUS ONCE AS NEEDED
Status: CANCELLED | OUTPATIENT
Start: 2025-01-13

## 2025-01-13 RX ORDER — PROCHLORPERAZINE EDISYLATE 5 MG/ML
5 INJECTION INTRAMUSCULAR; INTRAVENOUS ONCE AS NEEDED
Status: CANCELLED | OUTPATIENT
Start: 2025-01-13

## 2025-01-13 RX ADMIN — SODIUM CHLORIDE: 9 INJECTION, SOLUTION INTRAVENOUS at 11:01

## 2025-01-13 RX ADMIN — HEPARIN 500 UNITS: 100 SYRINGE at 01:01

## 2025-01-13 RX ADMIN — SODIUM CHLORIDE 20 MG: 9 INJECTION, SOLUTION INTRAVENOUS at 11:01

## 2025-01-13 RX ADMIN — Medication 10 ML: at 01:01

## 2025-01-13 RX ADMIN — DOCETAXEL 152 MG: 20 INJECTION, SOLUTION, CONCENTRATE INTRAVENOUS at 12:01

## 2025-01-13 RX ADMIN — DIPHENHYDRAMINE HYDROCHLORIDE 25 MG: 50 INJECTION INTRAMUSCULAR; INTRAVENOUS at 11:01

## 2025-01-13 RX ADMIN — SODIUM CHLORIDE 800 MG: 9 INJECTION, SOLUTION INTRAVENOUS at 12:01

## 2025-01-13 NOTE — ASSESSMENT & PLAN NOTE
Cancer Staging   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  Staging form: Lung, AJCC 8th Edition  - Clinical: Stage IVB (cT2, cN1, pM1c) - Signed by Rudi Granados IV, MD on 7/8/2024    Labs reviewed overall stable. No concerning cytopenias. Patient denies interval clinical concerns. Initiated acupuncture today    Patient taking OTC potassium/magnesium/iron supplement. Ok to continue    Encouraged healthy nutrition and adequate PO Hydration daily. He notes improvement in appetite. Weight +4lbs    Proceed with cycle 2 chemo today. F/u 3 weeks for cycle 3 chemo    Per primary oncologist repeat imaging 12 weeks from last (due around 2/25/2025)

## 2025-01-13 NOTE — PROGRESS NOTES
Subjective:       Patient ID: Ernie Walton is a 69 y.o. male.    Chief Complaint: Review labs. Consideration of cycle 2 Taxotere/Cyramza    The patient location is: [facility]  The chief complaint leading to consultation is: Chemo    Visit type: audiovisual    Face to Face time with patient: 10 minutes  40 minutes of total time spent on the encounter, which includes face to face time and non-face to face time preparing to see the patient (eg, review of tests), Obtaining and/or reviewing separately obtained history, Documenting clinical information in the electronic or other health record, Independently interpreting results (not separately reported) and communicating results to the patient/family/caregiver, or Care coordination (not separately reported).         Each patient to whom he or she provides medical services by telemedicine is:  (1) informed of the relationship between the physician and patient and the respective role of any other health care provider with respect to management of the patient; and (2) notified that he or she may decline to receive medical services by telemedicine and may withdraw from such care at any time.    Notes:        Cancer Staging   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  Staging form: Lung, AJCC 8th Edition  - Clinical: Stage IVB (cT2, cN1, pM1c) - Signed by Rudi Granados IV, MD on 7/8/2024    HPI: 69 y.o. male with systemic sclerosis/scleroderma (dx'd 2020, on mycophenolate mofetil, prednisone, and nintendinab), HTN, HLD, BPH, h/o nephrolithiasis w/ recurrent UTI, and DDD who presents for f/u of the below lung cancer. His primary oncologist has been Dr. Erik Clay, and he has established care with Dr. Holley ([facility]).     1)  Stage IVB (K6D5S5d) adenocarcinoma of the LLL (PD-L1 TPS 1%, no targetable mutations, STK11/TP53/KEAP1 co-mutations) w/ diffuse osseous disease , initially dx'd 5/15/24, s/p carboplatin/paclitaxel (6/5/24 - 9/17/24), and on supportive denosumab (last dose  9/17/24). Planning to switch to Zometa pending dental clearance. Dental work planned 1/15/2025        11/25/24: CT C/A/P with contrast---1.  Spiculated pulmonary nodule in the superior segment left lower lobe measuring 2.9 x 2.0 cm, presumably the patient's primary malignancy. 2. Metastatic left hilar lymphadenopathy. 3. Extensive osseous metastatic disease as detailed above. 4. No soft tissue metastatic disease in the abdomen or pelvis.    S/p C1D1 Taxotere/Cyramza 12/23/2024.    Presents today for consideration of cycle 2 chemotherapy. He notes feeling well today. He notes increasing strength. Utilizing cane for walking. He notes increased appetite this past week. Initiated acupuncture today which he enjoyed. He denies interval clinical concerns.    Oncology History   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   5/15/2024 Imaging Significant Findings    CT C, Non-Con (f/u after imaging for scleroderma clinical trial workup)  - 2.5 x 2.4 x 1.8 cm LLL nodule (previously 2.2 x 2.2 x 1.8 cm, 3/25/24)  - Patchy ground-glass and nodular opacities adjacent to LLL nodule  - 1.4 cm left hilar lymph node  - Right lower paratracheal lymphadenopathy  - Lytic lesion with erosive changes in T2 vertebral body, new lytic lesions in manubrium, right fourth rib, T3, T7, and T11 vertebral bodies with pathologic fracture of superior endplate of T3 vertebral body     5/15/2024 Procedure    Bronch w/ EBUS  LLL TBBx  - Adenocarcinoma (Positive: TTF-1; Negative: p40)    LLL, Core Biopsy  - Adenocarcinoma    LN  - Positive: Station 11L  - Negative: Station 4R, 7, 4L    Caris NGS / PD-L1      - KEAP1, TP53, STK11 mutated  - ERBB2 negative / 1+  - TMB 7  - TORIE       6/5/2024 Imaging Significant Findings    PET CT  - 2.2 cm LLL mass, SUV 4.8  - 1.6 cm left hilar node, SUV 6.8  - 1.3 cm right lower paratracheal node, SUV 2.2  - Hypermetabolic lytic lesions in multiple bones (C7 transverse process, multiple thoracic, lumbar, and sacral  segments, sternum, right scapular body, left scapular angle, several ribs, left ilium, left pubic body, bilateral ischia)     7/5/2024 Imaging Significant Findings    MRI Brain  - LANNY in brain or dura  - Mild enhancement within clivus, cannot rule out bony met     7/8/2024 Initial Diagnosis    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     7/8/2024 Cancer Staged    Staging form: Lung, AJCC 8th Edition  - Clinical: Stage IVB (cT2, cN1, pM1c)     8/9/2024 Imaging Significant Findings    MRI C/T/L Spine  Diffuse osseous metastasis throughout the visualized spine without any evidence of significant compression fracture or retropulsion with involvement of the vertebral bodies and posterior elements at multiple levels.   No evidence of cervical/thoracic spinal cord compression with impingement of the ventral aspect of the cord from adjacent disc disease at multiple levels as discussed above.   Discal disease with foraminal stenosis at multiple levels throughout the spine with most prominent involvement seen at L4-5 and L5-S1 levels mostly degenerative in nature.   No abnormal intraspinal enhancement with no epidural enhancement noted        9/27/2024 Imaging Significant Findings    PET CT (compared against PET CT from 6/5/24)  1.4 x 1.0 cm LLL nodule (previously 2.0 x 1.3 cm), SUV 6.8 (similar)  New fairly defined opacity adjacent to the primary mass measuring up 1.8. The activity overlying the primary tumor is now confluent with the new opacity, leading to increased extent of activity.   1.2 x 2.0 cm L hilar LN (previously 1.4 x 2.2 cm), SUV 5.2  Numerous FDG-avid bone metastases are noted. Although previously visualized lesions show some areas of improvement, there are multiple new active bone metastases (L4, T8). The pattern suggests initial response to treatment with subsequent progressive disease      11/25/2024 Imaging Significant Findings    11/25/24 -- CT C/A/P  2.9 x 2.0 cm spiculated LLL pulmonary nodule  2.2 x  1.9 cm left hilar lymph node  Dependent pulmonary infiltrates  Extensive osseous metastatic disease (scattered lytic and sclerotic lesions throughout thoracic spine, manubrium, left scapula, pelvis, sacrum, and lumbar spine)     12/23/2024 -  Chemotherapy    Treatment Summary   Plan Name: OP NSCLC ramucirumab DOCEtaxel Q3W   Treatment Goal: Palliative  Status: Active  Start Date: 12/23/2024  End Date: 8/4/2025 (Planned)  Provider: Rudi Granados IV, MD  Chemotherapy: DOCEtaxel (TAXOTERE) 75 mg/m2 = 154 mg in 0.9% NaCl 292.7 mL chemo infusion, 75 mg/m2 = 154 mg, Intravenous, Clinic/HOD 1 time, 2 of 12 cycles  Administration: 154 mg (12/23/2024)  ramucirumab (CYRAMZA) 800 mg in 0.9% NaCl 250 mL chemo infusion, 830 mg, Intravenous, Clinic/HOD 1 time, 2 of 12 cycles  Administration: 800 mg (12/23/2024)         Social History     Socioeconomic History    Marital status:    Tobacco Use    Smoking status: Never     Passive exposure: Never    Smokeless tobacco: Never     Social Drivers of Health     Financial Resource Strain: Low Risk  (11/4/2024)    Overall Financial Resource Strain (CARDIA)     Difficulty of Paying Living Expenses: Not very hard   Food Insecurity: No Food Insecurity (11/4/2024)    Hunger Vital Sign     Worried About Running Out of Food in the Last Year: Never true     Ran Out of Food in the Last Year: Never true   Physical Activity: Inactive (11/4/2024)    Exercise Vital Sign     Days of Exercise per Week: 0 days     Minutes of Exercise per Session: 0 min   Stress: Stress Concern Present (11/4/2024)    [employer] - Occupational Stress Questionnaire     Feeling of Stress : To some extent   Housing Stability: Unknown (11/4/2024)    Housing Stability Vital Sign     Unable to Pay for Housing in the Last Year: No       Past Medical History:   Diagnosis Date    DDD (degenerative disc disease), lumbosacral     Low back pain     Lung cancer        No family  history on file.    No past surgical history on file.    Review of Systems   Constitutional:  Positive for appetite change and fatigue. Negative for chills, fever and unexpected weight change.   HENT:  Negative for congestion, mouth sores, nosebleeds, sore throat, trouble swallowing and voice change.    Respiratory:  Negative for cough, chest tightness, shortness of breath and wheezing.    Cardiovascular:  Negative for chest pain and leg swelling.   Gastrointestinal:  Negative for abdominal distention, abdominal pain, blood in stool, constipation, diarrhea, nausea and vomiting.   Genitourinary:  Negative for difficulty urinating, dysuria and hematuria.   Musculoskeletal:  Negative for arthralgias, back pain and myalgias.   Skin:  Negative for pallor, rash and wound.   Neurological:  Positive for weakness. Negative for dizziness, syncope and headaches.   Hematological:  Negative for adenopathy. Does not bruise/bleed easily.   Psychiatric/Behavioral:  The patient is nervous/anxious.          Medication List with Changes/Refills   Current Medications    AMLODIPINE (NORVASC) 5 MG TABLET    Take 1 tablet by mouth once daily.    ATORVASTATIN (LIPITOR) 40 MG TABLET    Take 1 tablet by mouth once daily.    CYANOCOBALAMIN 1,000 MCG/ML INJECTION    Inject 1,000 mcg into the muscle every 30 days.    DIPHENHYDRAMINE-ALUMINUM-MAGNESIUM HYDROXIDE-SIMETHICONE-LIDOCAINE VISCOUS HCL 2%    Swish and spit 15 mLs every 4 (four) hours as needed.    DULOXETINE (CYMBALTA) 30 MG CAPSULE    Take 1 capsule (30 mg total) by mouth once daily for 7 days, THEN 2 capsules (60 mg total) once daily.    FERROUS SULFATE (FEOSOL) 325 MG (65 MG IRON) TAB TABLET    Take 325 mg by mouth with breakfast.    FLUTICASONE PROPIONATE (FLONASE) 50 MCG/ACTUATION NASAL SPRAY    1 spray by Each Nostril route once daily.    FOLIC ACID, BULK, 100 % POWD    Take 666 mcg by mouth.    IBUPROFEN (ADVIL,MOTRIN) 800 MG TABLET    Take 1 tablet (800 mg total) by mouth every  8 (eight) hours as needed for Pain.    LACTOBACILLUS RHAMNOSUS GG (CULTURELLE) 10 BILLION CELL CAPSULE    Take 1 capsule by mouth every morning.    LIDOCAINE 5 % CREA    SMARTSIG:Sparingly Topical 3 Times Daily    LISINOPRIL 10 MG TABLET    Take 2 tablets by mouth once daily.    MAGIC MOUTHWASH DIPHEN/ANTAC/LIDOC    Swish and spit 15 mLs every 4 (four) hours as needed.    MUPIROCIN (BACTROBAN) 2 % OINTMENT    Apply 1 Application topically.    MYCOPHENOLATE SODIUM ORAL    Take 1,500 mg by mouth.    NINTEDANIB (OFEV) 100 MG CAP    Take 100 mg by mouth 2 (two) times daily.    OMEGA 3-DHA-EPA-FISH OIL 1,000 MG (120 MG-180 MG) CAP    Take 1,000 mg by mouth.    ONDANSETRON (ZOFRAN-ODT) 4 MG TBDL    Take 4 mg by mouth.    PANTOPRAZOLE (PROTONIX) 40 MG TABLET    Take 40 mg by mouth.    PHENAZOPYRIDINE (PYRIDIUM) 200 MG TABLET    Take 200 mg by mouth 3 (three) times daily as needed.    PREDNISONE, BULK, POWD    Take 7.5 mg by mouth.    PREGABALIN (LYRICA) 75 MG CAPSULE    Take 75 mg by mouth 2 (two) times daily.    RALOXIFENE (EVISTA) 60 MG TABLET    Take 1 tablet by mouth once daily.    SILDENAFIL (REVATIO) 20 MG TAB    Take 1 tablet by mouth 3 (three) times daily.    TAMSULOSIN (FLOMAX) 0.4 MG CAP    Take 1 capsule by mouth once daily.    TRAMADOL (ULTRAM) 50 MG TABLET    Take 1 tablet by mouth every 6 (six) hours as needed.    VITAMIN D (VITAMIN D3) 1000 UNITS TAB    Take by mouth once daily.    VITAMIN E 100 UNIT/0.25 ML DROP    Take 180 Units by mouth.    VITAMIN E, DL, ACETATE, 90 MG (200 UNIT) CAP    Take 1 capsule by mouth every morning.     Objective:     There were no vitals filed for this visit.    Lab Results   Component Value Date    WBC 10.78 01/13/2025    HGB 10.2 (L) 01/13/2025    HCT 34.3 (L) 01/13/2025    MCV 89 01/13/2025     01/13/2025       BMP  Lab Results   Component Value Date     01/13/2025    K 3.5 01/13/2025     01/13/2025    CO2 23 01/13/2025    BUN 12 01/13/2025    CREATININE  0.9 01/13/2025    CALCIUM 10.0 01/13/2025    ANIONGAP 15 01/13/2025    EGFRNORACEVR >60 01/13/2025     Lab Results   Component Value Date    ALT 30 01/13/2025    AST 24 01/13/2025    ALKPHOS 95 01/13/2025    BILITOT 0.2 01/13/2025         Physical Exam  HENT:      Right Ear: External ear normal.      Left Ear: External ear normal.   Cardiovascular:      Rate and Rhythm: Tachycardia present.   Pulmonary:      Effort: Pulmonary effort is normal. No respiratory distress.      Breath sounds: Normal breath sounds.   Abdominal:      General: There is no distension.   Musculoskeletal:         General: Normal range of motion.   Neurological:      Mental Status: He is alert and oriented to person, place, and time.        Assessment:     Problem List Items Addressed This Visit          Oncology    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 - Primary      Cancer Staging   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  Staging form: Lung, AJCC 8th Edition  - Clinical: Stage IVB (cT2, cN1, pM1c) - Signed by Rudi Granados IV, MD on 7/8/2024    Labs reviewed overall stable. No concerning cytopenias. Patient denies interval clinical concerns. Initiated acupuncture today    Patient taking OTC potassium/magnesium/iron supplement. Ok to continue    Encouraged healthy nutrition and adequate PO Hydration daily. He notes improvement in appetite. Weight +4lbs    Proceed with cycle 2 chemo today. F/u 3 weeks for cycle 3 chemo    Per primary oncologist repeat imaging 12 weeks from last (due around 2/25/2025)             Relevant Orders    Magnesium (Completed)    Magnesium    Metastasis to bone     Planned to initiate Zometa Q 3 months pending dental clearance. His is planning to have dental work 1/15/2025              Plan:     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  -     Magnesium; Future; Expected date: 01/13/2025  -     Magnesium; Future; Expected date: 01/13/2025    Metastasis to bone    Other orders  -     Cancel: dexAMETHasone 20  mg in 0.9% NaCl 50 mL IVPB  -     Cancel: diphenhydrAMINE injection 25 mg  -     Cancel: ramucirumab (CYRAMZA) 798 mg in 0.9% NaCl 250 mL chemo infusion  -     Cancel: DOCEtaxel (TAXOTERE) 75 mg/m2 = 150 mg in 0.9% NaCl 257.5 mL chemo infusion  -     Cancel: prochlorperazine injection Soln 5 mg  -     EPINEPHrine (EPIPEN) 0.3 mg/0.3 mL pen injection 0.3 mg  -     diphenhydrAMINE injection 50 mg  -     hydrocortisone sodium succinate injection 100 mg  -     Cancel: 0.9% NaCl 250 mL flush bag  -     Cancel: sodium chloride 0.9% flush 10 mL  -     Cancel: heparin, porcine (PF) 100 unit/mL injection flush 500 Units  -     alteplase injection 2 mg          Med Onc Chart Routing      Follow up with physician 3 weeks. Dr. Bailey (also please cancel 2/17 lab/visit/zometa appt.) pt not cleared from dentist   Follow up with ADRIANNA    Infusion scheduling note   cyramza/taxotere ([facility] location per patient request)   Injection scheduling note    Labs CBC, CMP and magnesium   Scheduling:  Preferred lab:  Lab interval:     Imaging None      Pharmacy appointment No pharmacy appointment needed      Other referrals       No additional referrals needed         CLARITZA Nino

## 2025-01-13 NOTE — PROGRESS NOTES
"  Acupuncture Evaluation Note     Name: Ernie Walton  Clinic Number: [id number]    Traditional Chinese Medicine (TCM) Diagnosis: Qi Stagnation and Blood Stasis  Medical Diagnosis:   Encounter Diagnosis   Name Primary?    Other low back pain Yes        Evaluation Date: 1/13/2025    Visit #/Visits authorized: 1/12    Precautions: Standard and cancer    Subjective     Chief Concern:   New blend of chemo - had 6 rounds in [place]    Flares some times - bad with lots of walking  Lumbar pain on (R) side - can't walk, puts pressure on that spot  Hasn't tried heat, no topicals -     1st round of new chemo blend was 3 weeks ago   Seeing Dr. Leo Ng every 3 weeks  Sleeplessness    Woke on Thursday and couldn't walk out to [place] from [place]   Stayed like that on Thursday until New Years day -   Sores in mouth    Felt weak, hard to move, when appetite came back his mobility came back   Didn't feel nauseous and didn't throw up   But appetite came back  Lost appetite     Saw neurologist in [place], degenerative discs in low back   Right leg is worse than Left leg    Left foot feels "frozen" - cold  Sleeps with a blanket and a snuggie    In September - end of of Sept.   Contrast in [place] - the contrast made him sick - ever since the neuropathy has been worse.    Hands also have neuropathy   Hx of sclerodoma    Was 189, now 175 -   Drinking protein drinks, gave him a case of them -       Medical necessity is demonstrated by the following IMPAIRMENTS: Medical Necessity: Cancer related pain, Decreased mobility limits day to day activities, social, and emergent situations, and Decreased quality of life              Aggravating Factors:  movement and prolonged walking    Relieving Factors:  rest    Symptom Description:     Quality:  Aching  Severity:  10  Frequency:  when active    Previous Treatments Tried:  Imaging    HEENT:      Chest:      Digestion:     Diet: not asked   Fluids:   Taste/Appetite: okay at the moment, but poor " after chemo infusion   Symptoms:     Sleep: improving    Energy Levels:  poor after chemo    Psychological Symptoms:      Other Symptoms:     GYN Symptoms:     Objective     Observation:     Tongue:      Body:     Color:     Coating:      Pulse:        wiry       New Findings:  none    Treatment     Treatment Principles:  move qi and blood stasis    Acupuncture points used:  4 GLYNN and BA GUSTAVO    Bilateral points: LI11, SP9, Sp6, Ki3, UB60, SJ5, SI3  Unilateral points:  Auricular Treatment:  apple men    Needles In: 28  Needles Out: 28  [place] W/ STIM placed: 8:30  Needles W/ STIM removed: 9:00      Other Traditional Chinese Medicine Modalities -  heat lamp    Assessment     After treatment, patient felt good     Patient prognosis is Good.     Patient will continue to benefit from acupuncture treatment to address the deficits listed in the problem list box on initial evaluation, provide patient family education and to maximize pt's level of independence in the home and community environment.     Patient's spiritual, cultural and educational needs considered and pt agreeable to plan of care and goals.     Anticipated barriers to treatment: time between treatments (3 weeks), Mr. Walton lives in [place].    Plan     Recommend 1 /week for 6 sessions then re-assess.      Education:  Patient is aware of cumulative benefit of acupuncture

## 2025-01-13 NOTE — ASSESSMENT & PLAN NOTE
Planned to initiate Zometa Q 3 months pending dental clearance. His is planning to have dental work 1/15/2025

## 2025-01-17 ENCOUNTER — CLINICAL SUPPORT (OUTPATIENT)
Dept: NUTRITION | Facility: CLINIC | Age: 70
End: 2025-01-17
Payer: MEDICARE

## 2025-01-17 DIAGNOSIS — T45.1X5A CHEMOTHERAPY-INDUCED PERIPHERAL NEUROPATHY: ICD-10-CM

## 2025-01-17 DIAGNOSIS — C34.32 ADENOCARCINOMA OF LOWER LOBE OF LEFT LUNG: ICD-10-CM

## 2025-01-17 DIAGNOSIS — G62.0 CHEMOTHERAPY-INDUCED PERIPHERAL NEUROPATHY: ICD-10-CM

## 2025-01-17 PROCEDURE — 97802 MEDICAL NUTRITION INDIV IN: CPT | Mod: 95,,,

## 2025-01-17 RX ORDER — DULOXETIN HYDROCHLORIDE 60 MG/1
60 CAPSULE, DELAYED RELEASE ORAL DAILY
Qty: 30 CAPSULE | Refills: 3 | Status: SHIPPED | OUTPATIENT
Start: 2025-01-17 | End: 2025-05-17

## 2025-01-17 NOTE — PROGRESS NOTES
"Oncology Nutrition Assessment for Medical Nutrition Therapy Initial Visit  Consultation Time: 30 Minutes  Referring Provider: Rudi Granados MD  Reason for Nutrition Consult: New Patient - Nutrition Counseling and Education  and Nutrition related questions  The patient location is: home. The chief complaint leading to consultation is: lung cancer.   Visit type: audiovisual   Face to Face time with patient: 20 minutes 30 minutes of total time spent on the encounter, which includes face to face time and non-face to face time preparing to see the patient (eg, review of tests), Obtaining and/or reviewing separately obtained history, Documenting clinical information in the electronic or other health record, Independently interpreting results (not separately reported) and communicating results to the patient/family/caregiver, or Care coordination (not separately reported).    Each patient to whom he or she provides medical services by telemedicine is:  (1) informed of the relationship between the physician and patient and the respective role of any other health care provider with respect to management of the patient; and (2) notified that he or she may decline to receive medical services by telemedicine and may withdraw from such care at any time. Note below:     Nutrition Assessment      Patient Information:    Ernie Walton  : 1955   69 y.o. male    Allergies/Intolerances: No known food allergies  Social Data: lives with spouse/significant Other.   Anthropometrics:     Weight:   81.4 kg (179 lb 7 oz)                                Height:   5'11" (1.8 m)    BMI: 25            Usual BW: 180-185 lb  Weight Change: none      Supplements/Vitamins:    MVI/Supp: yes - vitamin D and E, iron, folate   Drug/Nutrient interactions: No Activity Level:     Sedentary     Form of Activity: activities of daily living , No exercise routine      Malnutrition Assessment:   Nutrition Risk: Patient does not meet at least 2 " characteristics of the ASPEN/AND criteria at this time.     Food/Nutrition-related history:    Diet/PO Recall:   Appetite: Fair  Fluid Intake: Adequate  Diet Recall:  Breakfast: strawberry banana instant oatmeal, blueberry yogurt   Lunch: pureed Stevenson[employer] soup   Dinner: Boost   Snacks: 1-2x/day: Jello, fruit cup  Drinks: water  ONS: Boost Plus and Ensure Complete 1-2x/day  Servings of F/V per day: 1-2x/day  Eating out: 0-1x/week.   Cultural/Spiritual/Personal Preferences: Texture specific     GI Symptoms: No GI symptoms   Oncology Nutrition Symptoms: dental problems              Difficulty chewing or swallowing?  yes - teeth extracted recently, on edentulous diet for 1 week    Patient Notes/Reports: Pt referred for a Nutrition Consultation related to New Patient - Nutrition Counseling and Education  and Nutrition related questions. Patient has a medical diagnosis of lung cancer and went through dental extractions recently. Currently on OP NSCLC ramucirumab DOCEtaxel Q3W chemotherapy. Mr. Walton shared he has plenty of Boost and Ensure at home right now and has a variety of soft and pureed foods to eat while his mouth is healing from dental work. He has not started chemo yet but will call if his diet and weight worsen.   Medical Tests and Procedures:  Patient Active Problem List   Diagnosis    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    Metastasis to bone      Past Medical History:   Diagnosis Date    DDD (degenerative disc disease), lumbosacral     Low back pain     Lung cancer      No past surgical history on file.    Current Outpatient Medications   Medication Instructions    amLODIPine (NORVASC) 5 MG tablet 1 tablet, Daily    atorvastatin (LIPITOR) 40 MG tablet 1 tablet, Daily    cyanocobalamin 1,000 mcg, Every 30 days    diphenhydrAMINE-aluminum-magnesium hydroxide-simethicone-LIDOcaine viscous HCl 2% 15 mLs, Swish & Spit, Every 4 hours PRN    DULoxetine (CYMBALTA) 30 MG capsule Take 1 capsule (30 mg total) by  mouth once daily for 7 days, THEN 2 capsules (60 mg total) once daily.    ferrous sulfate (FEOSOL) 325 mg, with breakfast    fluticasone propionate (FLONASE) 50 mcg/actuation nasal spray 1 spray, Daily    folic acid (bulk) 666 mcg    ibuprofen (ADVIL,MOTRIN) 800 mg, Oral, Every 8 hours PRN    Lactobacillus rhamnosus GG (CULTURELLE) 10 billion cell capsule 1 capsule, Every morning    LIDOcaine 5 % Crea SMARTSIG:Sparingly Topical 3 Times Daily    lisinopriL 10 MG tablet 2 tablets, Daily    magic mouthwash diphen/antac/lidoc 15 mLs, Swish & Spit, Every 4 hours PRN    mupirocin (BACTROBAN) 2 % ointment 1 Application    MYCOPHENOLATE SODIUM ORAL 1,500 mg    nintedanib (OFEV) 100 mg, 2 times daily    omega 3-dha-epa-fish oil 1,000 mg (120 mg-180 mg) Cap 1,000 mg    ondansetron (ZOFRAN-ODT) 4 mg    pantoprazole (PROTONIX) 40 mg    phenazopyridine (PYRIDIUM) 200 mg, 3 times daily PRN    predniSONE, bulk, Powd 7.5 mg    pregabalin (LYRICA) 75 mg, 2 times daily    raloxifene (EVISTA) 60 mg tablet 1 tablet, Daily    sildenafil (REVATIO) 20 mg Tab 1 tablet, 3 times daily    tamsulosin (FLOMAX) 0.4 mg Cap 1 capsule, Daily    traMADoL (ULTRAM) 50 mg tablet 1 tablet, Every 6 hours PRN    vitamin D (VITAMIN D3) 1000 units Tab Daily    vitamin E, dl, acetate, 90 mg (200 unit) Cap 1 capsule, Every morning    vitamin E 180 Units      Labs:  Reviewed - followed by MD leon       Nutrition Diagnosis    Nutrition Problem: inadequate protein/energy intake  Etiology (related to): tumor location and dental work  Signs/Symptoms (as evidenced by): new cancer diagnosis and self reported diet questions/concerns     Nutrition Intervention      Estimated Energy/Fluid Requirements:   Weight used: CBW 81 kg  Calories: 8187-3854 kcal/day (25-28 kcal/kg)  Protein: 81 g/day (1.0 g/kg)  Fluid:  2025-226 mL/day (1 mL/kcal)   Recommendations:   Make meals/snacks high in calories and protein (chicken/tuna/egg salad, avocado, nuts/peanut butter, fatty fish,  jessica, flax, oils )    Aim to eat a small meal or supplement every 2-3 hours     Ensure balanced eating pattern of 3 meals, 1-2 snacks daily. Avoid skipped meals.    Track weight and food intake, and call dietitian if these worsen.     Education Needs Satisfied: yes   Education Materials Provided / Reviewed:   High-Calorie, High-Protein Diet  and High-Calorie Food List and Snack Ideas   Foods That Are Easy to Chew and Swallow   Nutrition During Your Cancer Treatment  Nutrition Plan  Barriers to Learning: none identified  Patient and/ or Family Verbalizes understanding: yes      Nutrition Monitoring and Evaluation    Monitor: energy intake, diet tolerance , weight, and symptom management     Goals:   1: Adequate intake of Calories and protein to promote weight maintenance  Indicator: Weight/BMI    2: Adherence to nutrition recommendations for improved symptom management  Indicator: Diet Recall     Follow up Patient provided with dietitian contact number and advised to call with questions or make future appointment if further intervention is needed.    Communication to referring provider/care team: note available in chart  Signature: Laura Alfredo, MPH, RD, LDN

## 2025-01-17 NOTE — PATIENT INSTRUCTIONS
Recommendations:   Make meals/snacks high in calories and protein (chicken/tuna/egg salad, avocado, nuts/peanut butter, fatty fish, jessica, flax, oils)    Aim to eat a small meal or supplement every 2-3 hours     Ensure balanced eating pattern of 3 meals, 1-2 snacks daily. Avoid skipped meals.    Track weight and food intake, and call dietitian if these worsen.

## 2025-01-31 ENCOUNTER — PATIENT MESSAGE (OUTPATIENT)
Dept: HEMATOLOGY/ONCOLOGY | Facility: CLINIC | Age: 70
End: 2025-01-31
Payer: MEDICARE

## 2025-02-03 ENCOUNTER — CLINICAL SUPPORT (OUTPATIENT)
Facility: HOSPITAL | Age: 70
End: 2025-02-03
Payer: MEDICARE

## 2025-02-03 DIAGNOSIS — M54.59 OTHER LOW BACK PAIN: Primary | ICD-10-CM

## 2025-02-03 PROCEDURE — 97814 ACUP 1/> W/ESTIM EA ADDL 15: CPT

## 2025-02-03 PROCEDURE — 97813 ACUP 1/> W/ESTIM 1ST 15 MIN: CPT

## 2025-02-03 NOTE — PROGRESS NOTES
"  Acupuncture Evaluation Note     Name: Ernie Walton  Clinic Number: [id number]    Traditional Chinese Medicine (TCM) Diagnosis: Qi Stagnation and Blood Stasis  Medical Diagnosis:   Encounter Diagnosis   Name Primary?    Other low back pain Yes        Evaluation Date: 2/3/2025    Visit #/Visits authorized: 2/12    Precautions: Standard and cancer    Subjective     Chief Concern:   Mr. Walton noticed a difference after his first acupuncture treatment. "Strutted out of here."  He is currently using a cane to help him walk for back pain and neuropathy. He also has compression socks now that he is trying.    New blend of chemo - had 6 rounds in [place]    Flares some times - bad with lots of walking  Lumbar pain on (R) side - can't walk, puts pressure on that spot  Hasn't tried heat, no topicals -     1st round of new chemo blend was 3 weeks ago   Seeing Dr. Leo Ng every 3 weeks  Sleeplessness    Woke on Thursday and couldn't walk out to lobby - from [place]   Stayed like that on Thursday until New Years day -   Sores in mouth    Felt weak, hard to move, when appetite came back his mobility came back   Didn't feel nauseous and didn't throw up   But appetite came back  Lost appetite     Saw neurologist in [place], degenerative discs in low back   Right leg is worse than Left leg    Left foot feels "frozen" - cold  Sleeps with a blanket and a snuggie    In September - end of of Sept.   Contrast in [place] - the contrast made him sick - ever since the neuropathy has been worse.    Hands also have neuropathy   Hx of sclerodoma    Was 189, now 175 -   Drinking protein drinks, gave him a case of them -       Medical necessity is demonstrated by the following IMPAIRMENTS: Medical Necessity: Cancer related pain, Decreased mobility limits day to day activities, social, and emergent situations, and Decreased quality of life              Aggravating Factors:  movement and prolonged walking    Relieving Factors:  rest    Symptom " Description:     Quality:  Aching  Severity:  10  Frequency:  when active    Previous Treatments Tried:  Imaging    HEENT:      Chest:      Digestion:     Diet: not asked   Fluids:   Taste/Appetite: okay at the moment, but poor after chemo infusion   Symptoms:     Sleep: improving    Energy Levels:  poor after chemo    Psychological Symptoms:      Other Symptoms:     GYN Symptoms:     Objective     Observation:     Tongue:      Body:     Color:     Coating:      Pulse:        wiry       New Findings:  none    Treatment     Treatment Principles:  move qi and blood stasis    Acupuncture points used:  4 GLYNN and BA JOSE    Bilateral points: SP9, ST36, Sp6, Ki3, UB60,  (Ba Jose with stim)   Unilateral points:  Auricular Treatment:  apple men    Needles In: 28  Needles Out: 28  [place] W/ STIM placed: 10:15  Needles W/ STIM removed: 10:55      Other Traditional Chinese Medicine Modalities -  heat lamp    Assessment     After treatment, patient felt good     Patient prognosis is Good.     Patient will continue to benefit from acupuncture treatment to address the deficits listed in the problem list box on initial evaluation, provide patient family education and to maximize pt's level of independence in the home and community environment.     Patient's spiritual, cultural and educational needs considered and pt agreeable to plan of care and goals.     Anticipated barriers to treatment: time between treatments (3 weeks), Mr. Walton lives in [place].    Plan     Recommend 1 /week for 6 sessions then re-assess.      Education:  Patient is aware of cumulative benefit of acupuncture

## 2025-02-04 ENCOUNTER — INFUSION (OUTPATIENT)
Dept: INFUSION THERAPY | Facility: HOSPITAL | Age: 70
End: 2025-02-04
Attending: HOSPITALIST
Payer: MEDICARE

## 2025-02-04 ENCOUNTER — OFFICE VISIT (OUTPATIENT)
Dept: HEMATOLOGY/ONCOLOGY | Facility: CLINIC | Age: 70
End: 2025-02-04
Payer: MEDICARE

## 2025-02-04 VITALS
BODY MASS INDEX: 25.14 KG/M2 | OXYGEN SATURATION: 95 % | DIASTOLIC BLOOD PRESSURE: 75 MMHG | HEIGHT: 71 IN | WEIGHT: 179.56 LBS | RESPIRATION RATE: 18 BRPM | TEMPERATURE: 97 F | SYSTOLIC BLOOD PRESSURE: 118 MMHG | HEART RATE: 97 BPM

## 2025-02-04 VITALS
BODY MASS INDEX: 25.14 KG/M2 | HEART RATE: 119 BPM | TEMPERATURE: 97 F | OXYGEN SATURATION: 100 % | DIASTOLIC BLOOD PRESSURE: 76 MMHG | SYSTOLIC BLOOD PRESSURE: 122 MMHG | HEIGHT: 71 IN | WEIGHT: 179.56 LBS

## 2025-02-04 DIAGNOSIS — C79.51 SECONDARY MALIGNANT NEOPLASM OF BONE: ICD-10-CM

## 2025-02-04 DIAGNOSIS — C34.32 ADENOCARCINOMA OF LOWER LOBE OF LEFT LUNG: Primary | ICD-10-CM

## 2025-02-04 DIAGNOSIS — M34.9 SCLERODERMA: ICD-10-CM

## 2025-02-04 DIAGNOSIS — K12.30 MUCOSITIS: ICD-10-CM

## 2025-02-04 DIAGNOSIS — C77.1 SECONDARY MALIGNANT NEOPLASM OF INTRATHORACIC LYMPH NODES: ICD-10-CM

## 2025-02-04 DIAGNOSIS — R05.9 COUGH, UNSPECIFIED TYPE: ICD-10-CM

## 2025-02-04 DIAGNOSIS — G62.0 CHEMOTHERAPY-INDUCED PERIPHERAL NEUROPATHY: ICD-10-CM

## 2025-02-04 DIAGNOSIS — T45.1X5A CHEMOTHERAPY-INDUCED PERIPHERAL NEUROPATHY: ICD-10-CM

## 2025-02-04 PROCEDURE — 63600175 PHARM REV CODE 636 W HCPCS: Performed by: HOSPITALIST

## 2025-02-04 PROCEDURE — 99999 PR PBB SHADOW E&M-EST. PATIENT-LVL V: CPT | Mod: PBBFAC,,, | Performed by: HOSPITALIST

## 2025-02-04 PROCEDURE — 99215 OFFICE O/P EST HI 40 MIN: CPT | Mod: PBBFAC,25 | Performed by: HOSPITALIST

## 2025-02-04 PROCEDURE — 99215 OFFICE O/P EST HI 40 MIN: CPT | Mod: S$PBB,,, | Performed by: HOSPITALIST

## 2025-02-04 PROCEDURE — G2211 COMPLEX E/M VISIT ADD ON: HCPCS | Mod: S$PBB,,, | Performed by: HOSPITALIST

## 2025-02-04 PROCEDURE — 96367 TX/PROPH/DG ADDL SEQ IV INF: CPT

## 2025-02-04 PROCEDURE — 25000003 PHARM REV CODE 250: Performed by: HOSPITALIST

## 2025-02-04 PROCEDURE — 96413 CHEMO IV INFUSION 1 HR: CPT

## 2025-02-04 PROCEDURE — 96417 CHEMO IV INFUS EACH ADDL SEQ: CPT

## 2025-02-04 PROCEDURE — 96375 TX/PRO/DX INJ NEW DRUG ADDON: CPT

## 2025-02-04 RX ORDER — EPINEPHRINE 0.3 MG/.3ML
0.3 INJECTION SUBCUTANEOUS ONCE AS NEEDED
Status: DISCONTINUED | OUTPATIENT
Start: 2025-02-04 | End: 2025-02-04 | Stop reason: HOSPADM

## 2025-02-04 RX ORDER — HEPARIN 100 UNIT/ML
500 SYRINGE INTRAVENOUS
Status: CANCELLED | OUTPATIENT
Start: 2025-02-04

## 2025-02-04 RX ORDER — TRIAMCINOLONE ACETONIDE 1 MG/G
OINTMENT TOPICAL
COMMUNITY
Start: 2024-07-16

## 2025-02-04 RX ORDER — DIPHENHYDRAMINE HYDROCHLORIDE 50 MG/ML
25 INJECTION INTRAMUSCULAR; INTRAVENOUS
Status: COMPLETED | OUTPATIENT
Start: 2025-02-04 | End: 2025-02-04

## 2025-02-04 RX ORDER — DIPHENHYDRAMINE HYDROCHLORIDE 50 MG/ML
50 INJECTION INTRAMUSCULAR; INTRAVENOUS ONCE AS NEEDED
Status: DISCONTINUED | OUTPATIENT
Start: 2025-02-04 | End: 2025-02-04 | Stop reason: HOSPADM

## 2025-02-04 RX ORDER — BENZONATATE 100 MG/1
100 CAPSULE ORAL 3 TIMES DAILY PRN
Qty: 90 CAPSULE | Refills: 0 | Status: SHIPPED | OUTPATIENT
Start: 2025-02-04 | End: 2025-03-06

## 2025-02-04 RX ORDER — DIPHENHYDRAMINE HCL 50 MG
CAPSULE ORAL
Qty: 1 CAPSULE | Refills: 0 | Status: SHIPPED | OUTPATIENT
Start: 2025-02-04

## 2025-02-04 RX ORDER — PROCHLORPERAZINE EDISYLATE 5 MG/ML
5 INJECTION INTRAMUSCULAR; INTRAVENOUS ONCE AS NEEDED
Status: CANCELLED | OUTPATIENT
Start: 2025-02-04

## 2025-02-04 RX ORDER — HEPARIN 100 UNIT/ML
500 SYRINGE INTRAVENOUS
Status: DISCONTINUED | OUTPATIENT
Start: 2025-02-04 | End: 2025-02-04 | Stop reason: HOSPADM

## 2025-02-04 RX ORDER — SODIUM CHLORIDE 0.9 % (FLUSH) 0.9 %
10 SYRINGE (ML) INJECTION
Status: CANCELLED | OUTPATIENT
Start: 2025-02-04

## 2025-02-04 RX ORDER — LIDOCAINE HYDROCHLORIDE 20 MG/ML
SOLUTION OROPHARYNGEAL
COMMUNITY
Start: 2025-01-02

## 2025-02-04 RX ORDER — DIPHENHYDRAMINE HYDROCHLORIDE 50 MG/ML
50 INJECTION INTRAMUSCULAR; INTRAVENOUS ONCE AS NEEDED
Status: CANCELLED | OUTPATIENT
Start: 2025-02-04

## 2025-02-04 RX ORDER — PREDNISONE 50 MG/1
TABLET ORAL
Qty: 3 TABLET | Refills: 0 | Status: SHIPPED | OUTPATIENT
Start: 2025-02-04

## 2025-02-04 RX ORDER — EPINEPHRINE 0.3 MG/.3ML
0.3 INJECTION SUBCUTANEOUS ONCE AS NEEDED
Status: CANCELLED | OUTPATIENT
Start: 2025-02-04

## 2025-02-04 RX ORDER — DIPHENHYDRAMINE HYDROCHLORIDE 50 MG/ML
25 INJECTION INTRAMUSCULAR; INTRAVENOUS
Status: CANCELLED
Start: 2025-02-04

## 2025-02-04 RX ADMIN — SODIUM CHLORIDE 800 MG: 9 INJECTION, SOLUTION INTRAVENOUS at 10:02

## 2025-02-04 RX ADMIN — HEPARIN 500 UNITS: 100 SYRINGE at 11:02

## 2025-02-04 RX ADMIN — DIPHENHYDRAMINE HYDROCHLORIDE 25 MG: 50 INJECTION INTRAMUSCULAR; INTRAVENOUS at 09:02

## 2025-02-04 RX ADMIN — SODIUM CHLORIDE 20 MG: 9 INJECTION, SOLUTION INTRAVENOUS at 09:02

## 2025-02-04 RX ADMIN — DOCETAXEL 152 MG: 20 INJECTION, SOLUTION, CONCENTRATE INTRAVENOUS at 10:02

## 2025-02-04 NOTE — PLAN OF CARE
Problem: Adult Inpatient Plan of Care  Goal: Plan of Care Review  Outcome: Progressing  Flowsheets (Taken 2/4/2025 0847)  Plan of Care Reviewed With: patient  Goal: Optimal Comfort and Wellbeing  Outcome: Progressing  Intervention: Provide Person-Centered Care  Flowsheets (Taken 2/4/2025 0847)  Trust Relationship/Rapport:   care explained   choices provided   empathic listening provided   emotional support provided   questions answered   reassurance provided   questions encouraged   thoughts/feelings acknowledged     Problem: Chemotherapy Effects  Goal: Absence of Infection  Outcome: Progressing  Intervention: Prevent Infection and Maximize Resistance  Flowsheets (Taken 2/4/2025 0847)  Infection Prevention:   equipment surfaces disinfected   hand hygiene promoted   personal protective equipment utilized   rest/sleep promoted

## 2025-02-04 NOTE — PROGRESS NOTES
[facility] MEDICAL ONCOLOGY - FOLLOW UP VISIT    Reason for visit: Second opinion, Stage IV Adenocarcinoma of the Lung      Oncology History   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   5/15/2024 Imaging Significant Findings    CT C, Non-Con (f/u after imaging for scleroderma clinical trial workup)  - 2.5 x 2.4 x 1.8 cm LLL nodule (previously 2.2 x 2.2 x 1.8 cm, 3/25/24)  - Patchy ground-glass and nodular opacities adjacent to LLL nodule  - 1.4 cm left hilar lymph node  - Right lower paratracheal lymphadenopathy  - Lytic lesion with erosive changes in T2 vertebral body, new lytic lesions in manubrium, right fourth rib, T3, T7, and T11 vertebral bodies with pathologic fracture of superior endplate of T3 vertebral body     5/15/2024 Procedure    Bronch w/ EBUS  LLL TBBx  - Adenocarcinoma (Positive: TTF-1; Negative: p40)    LLL, Core Biopsy  - Adenocarcinoma    LN  - Positive: Station 11L  - Negative: Station 4R, 7, 4L    Caris NGS / PD-L1      - KEAP1, TP53, STK11 mutated  - ERBB2 negative / 1+  - TMB 7  - TORIE       6/5/2024 Imaging Significant Findings    PET CT  - 2.2 cm LLL mass, SUV 4.8  - 1.6 cm left hilar node, SUV 6.8  - 1.3 cm right lower paratracheal node, SUV 2.2  - Hypermetabolic lytic lesions in multiple bones (C7 transverse process, multiple thoracic, lumbar, and sacral segments, sternum, right scapular body, left scapular angle, several ribs, left ilium, left pubic body, bilateral ischia)     7/5/2024 Imaging Significant Findings    MRI Brain  - LANNY in brain or dura  - Mild enhancement within clivus, cannot rule out bony met     7/8/2024 Initial Diagnosis    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     7/8/2024 Cancer Staged    Staging form: Lung, AJCC 8th Edition  - Clinical: Stage IVB (cT2, cN1, pM1c)     8/9/2024 Imaging Significant Findings    MRI C/T/L Spine  Diffuse osseous metastasis throughout the visualized spine without any evidence of significant  compression fracture or retropulsion with involvement of the vertebral bodies and posterior elements at multiple levels.   No evidence of cervical/thoracic spinal cord compression with impingement of the ventral aspect of the cord from adjacent disc disease at multiple levels as discussed above.   Discal disease with foraminal stenosis at multiple levels throughout the spine with most prominent involvement seen at L4-5 and L5-S1 levels mostly degenerative in nature.   No abnormal intraspinal enhancement with no epidural enhancement noted        9/27/2024 Imaging Significant Findings    PET CT (compared against PET CT from 6/5/24)  1.4 x 1.0 cm LLL nodule (previously 2.0 x 1.3 cm), SUV 6.8 (similar)  New fairly defined opacity adjacent to the primary mass measuring up 1.8. The activity overlying the primary tumor is now confluent with the new opacity, leading to increased extent of activity.   1.2 x 2.0 cm L hilar LN (previously 1.4 x 2.2 cm), SUV 5.2  Numerous FDG-avid bone metastases are noted. Although previously visualized lesions show some areas of improvement, there are multiple new active bone metastases (L4, T8). The pattern suggests initial response to treatment with subsequent progressive disease      11/25/2024 Imaging Significant Findings    11/25/24 -- CT C/A/P  2.9 x 2.0 cm spiculated LLL pulmonary nodule  2.2 x 1.9 cm left hilar lymph node  Dependent pulmonary infiltrates  Extensive osseous metastatic disease (scattered lytic and sclerotic lesions throughout thoracic spine, manubrium, left scapula, pelvis, sacrum, and lumbar spine)     12/23/2024 -  Chemotherapy    Treatment Summary   Plan Name: OP NSCLC ramucirumab DOCEtaxel Q3W   Treatment Goal: Palliative  Status: Active  Start Date: 12/23/2024  End Date: 8/4/2025 (Planned)  Provider: Rudi Granados IV, MD  Chemotherapy: DOCEtaxel (TAXOTERE) 75 mg/m2 = 154 mg in 0.9% NaCl 292.7 mL chemo infusion, 75 mg/m2 = 154 mg, Intravenous, Clinic/HOD 1 time, 2  of 12 cycles  Administration: 154 mg (12/23/2024), 152 mg (1/13/2025)  ramucirumab (CYRAMZA) 800 mg in 0.9% NaCl 250 mL chemo infusion, 830 mg, Intravenous, Clinic/HOD 1 time, 2 of 12 cycles  Administration: 800 mg (12/23/2024), 800 mg (1/13/2025)        Previous Workup:   - 6/5/24:  Medical oncology (Dr. Erik Clay), discussed carboplatin/paclitaxel, plan to proceed  - 6/5/24: C1D1 carboplatin/paclitaxel  - 6/26/24: C2D1 carboplatin/paclitaxel    Oncology History    HPI:     Ernie Walton is a 69 y.o. male with pmh significant for systemic sclerosis/scleroderma (dx'd 2020, on mycophenolate mofetil, prednisone, and nintendinab), HTN, HLD, BPH, h/o nephrolithiasis w/ recurrent UTI, and DDD who presents for f/u of the below lung cancer. His primary oncologist has been Dr. Erik Clay, and he has established care with Dr. Holley ([facility]).    1)  Stage IVB ([id number]) adenocarcinoma of the LLL (PD-L1 TPS 1%, no targetable mutations, STK11/TP53/KEAP1 co-mutations) w/ diffuse osseous disease , initially dx'd 5/15/24, s/p carboplatin/paclitaxel (6/5/24 - 9/17/24), and on supportive denosumab (last dose 9/17/24), currently on 2L docetaxel/ramucirumab (12/23/24-present)    Last clinic with me 12/16/24, port placement on 12/17, C1D1 docetaxel/ramucirumab on 12/23/24.  Repeat imaging 12 weeks from last.  Dental clearance pending for Zometa.  Continue duloxetine.    Interval History:  12/18/24:  Discussed with rheumatology, hold on immunotherapy due to significant autoimmune disease/scleroderma  12/23/24:  Medical oncology (Kate Garzon), proceed with C1D1 docetaxel/ramucirumab, RTC in 3 weeks for C2  12/23/24: C1D1 docetaxel/ramucirumab  1/2/25: Medical oncology (Kate Garzon), tolerance check.  Weakness and fatigue predating chemotherapy, fatigue somewhat improved as well as appetite.  Developed sores in mouth and tenderness with peroxide mouthwashes.  Potassium 3.3, magnesium 1.5, saturated iron 12%. Continue oral potassium,  "magnesium, iron.  Trial of magic mouthwash.  1/13/25: Proceed w/ C2 docetaxel/ramucirumab, imaging 12 weeks from last (due around 2/25/25).  Follow up in 3 weeks  1/13/25: C2D1 docetaxel/ramucirumab  1/28/25:  Rheumatology, continue mycophenolate, no further prednisone.    The pt states that he tolerated his second cycle of chemotherapy significantly better than his first.     He notes that he had two teeth pulled on 1/15/25. He notes that he still requires 2 fillings. He had some soreness afterwards, which has since improved. He says that this soreness made it difficult to eat things with salt, pepper, and other seasoning; notably, he did have mucositis after his first cycle. He feels the magic mouthwash has been working well.     He says that his brother has been feeding him two large meals daily. He feels that his appetite is persevered. His weight has been stable since the last visit.     He notes 1 day of day of diarrhea, resolved after one dose of antidiarrheal medication.     He notes a bothersome cough. He says mucinex DM has not been helping, nor has Vicks DM. He says this tends to be at nighttime and he feels that it is triggered by his saliva. The coughing is usually around 5 AM.     He feels that acupuncture has been helpful, saying "it put a pep in my step" and notes that he was able to walk through the grocery store without a cane.     He states that he has stopped his opioid medication. He feels that they were not particularly helpful. He is currently taking 800 mg ibuprofen and THC oil.     He continues to take duloxetine, and feels that this has been helping somewhat.     Pt denies N/V, constipation, rash, new/worsening fatigue (stable), or new/worsening SOB (stable).    He has an impending dental appointment on 2/20/25. He states that his dentist still has the dental clearance form.       ROS:   As per HPI.       Physical Exam:       There were no vitals taken for this visit.               Physical " Exam  Constitutional:       Appearance: Normal appearance.   HENT:      Head: Normocephalic and atraumatic.   Eyes:      Extraocular Movements: Extraocular movements intact.      Conjunctiva/sclera: Conjunctivae normal.      Pupils: Pupils are equal, round, and reactive to light.   Cardiovascular:      Rate and Rhythm: Normal rate and regular rhythm.      Heart sounds: No murmur heard.     No friction rub. No gallop.   Pulmonary:      Effort: Pulmonary effort is normal.      Breath sounds: No wheezing, rhonchi or rales.   Musculoskeletal:         General: Normal range of motion.      Right lower leg: No edema.      Left lower leg: No edema.      Comments: Sclerodactyly evident   Skin:     General: Skin is warm and dry.   Neurological:      Mental Status: He is alert and oriented to person, place, and time.   Psychiatric:         Mood and Affect: Mood normal.         Thought Content: Thought content normal.         Judgment: Judgment normal.           Labs:   No results found for this or any previous visit (from the past 48 hours).     Imaging:    See oncologic history above.     Path:  See oncologic history above.      Assessment and Plan:     Ernie Walton is a 69 y.o. male with pmh significant for systemic sclerosis/scleroderma (dx'd 2020, on mycophenolate mofetil, prednisone, and nintendinab), HTN, HLD, BPH, h/o nephrolithiasis w/ recurrent UTI, and DDD who presents for f/u of the below lung cancer. His primary oncologist has been Dr. Erik Clay, and he has established care with Dr. Holley ([facility]).    1)  Stage IVB ([id number]) adenocarcinoma of the LLL (PD-L1 TPS 1%, no targetable mutations, STK11/TP53/KEAP1 co-mutations) w/ diffuse osseous disease , initially dx'd 5/15/24, s/p carboplatin/paclitaxel (6/5/24 - 9/17/24), and on supportive denosumab (last dose 9/17/24), currently on 2L docetaxel/ramucirumab (12/23/24-present)    Stage IVB Adenocarcinoma of the LLL. PD-L1 1%, No Targetable Mutations  ECOG PS 1.  Patient  is currently on docetaxel/ramucirumab, treatment complicated by mucositis as well as fatigue and decreased appetite (C1 only). Most recent imaging (CT C/A/P, 11/25/24, prior to new treatment start) demonstrated the known LLL mass, L hilar adenopathy, and extensive osseous metastatic disease (scattered lytic and sclerotic lesions). This was compared against a PET CT from 9/27/24 which demonstrated decrement in the size of the primary LLL mass but with worsening adjacent peripheral disease, mild decrement in size of the left hilar lymph node, and mixed response in the bones (some areas of improvement with multiple new active bone metastases).  He was subsequently started on 2L docetaxel/ramucirumab. Given relatively good tolerance (especially after cycle 2), will continue with docetaxel/ramucirumab per the REVEL trial.     Of note, patient previously discussed with the patient's rheumatologist Dr. Cordero who is in agreement to with holding immunotherapy in the setting of patient's significant autoimmune conditions.    PLAN:   Proceed with C3 docetaxel/ramucirumab today (2/4/25), labs acceptable (decrease in Hgb from 10.2-9.3) and treatment signed  CT C/A/P prior to RTC, ideally on 2/24/25  Labs with imaging (CBC, CMP, urinalysis), RTC and C4 docetaxel/ramucirumab on 2/25/25.  Patient would like all treatment at [facility] going forward to align with acupuncture      Bone Mets  Pt w/ diffuse osseous disease involving the C/T/L/S spine, sternum, R scapular body, L scapular angle, several ribs, L ilium, L pubic body, and b/l ischia. Pt was previously on xgeva, last dose on 9/17/24. On discussion with Dr. Clay's staff, the patient did NOT receive dental clearance in advance of this. The pt confirms this, and previously noted that he was planning to undergo a dental extraction in 1/2025. He has since had two teeth extracted and is awaiting dental f/u on 2/20/25; he confirms his dentist has the dental clearance form. Discussed  need to hold on further osteoclast inhibition in advance of this. Upon dental clearance, will transition to zometa q12w due to distance from [place].   Dental clearance pending      Peripheral Neuropathy  See previous note for description. Given timeline, most consistent with chemotherapy induced PN. Pt previously on pregabalin to minimal effect, transitioned to duloxetine. Pt endorses some improvement since medication change.   Continue duloxetine 60 mg daily      Scleroderma  Currently under the care of her rheumatologist in [place], on mycophenolate mofetil, nintendinab, and prednisone.  Active treatment precludes safe utilization of immunotherapy. Discussed with patient's primary rheumatologist Dr. Cordero, who both agreed with holding immunotherapy as well as with proceeding with ramucirumab.   Per Dr. Cordero's request, will obtain C3, C4, ESR, and CRP with next labs to minimize lab draws      Mucositis  2/2 chemotherapy.   Continue warm salt rinses  Continue magic mouthwash      Cough  Likely due to lung cancer.   Continue mucinex DM PRN  Trial of benzonatate      The above information has been reviewed with the patient and all questions have been answered to their apparent satisfaction.  They understand that they can call the clinic with any questions.    Rudi Granados MD  Hematology/Oncology  [facility]      Route Chart for Scheduling    Med Onc Chart Routing      Follow up with physician . CT C/A/P prior to RTC, ideally on 2/24/25. Labs with imaging (CBC, CMP, urinalysis, C3, C4, ESR, and c-reactive protein). RTC and C4 docetqaxel/ramucirumab on 2/25/25. Pt wants all appiontments at [facility] going forward.   Follow up with ADRIANNA    Infusion scheduling note    Injection scheduling note    Labs    Imaging    Pharmacy appointment    Other referrals                      Disclaimer: This note was prepared using voice recognition system and is likely to have sound alike errors.  Please call me with  any questions.

## 2025-02-06 ENCOUNTER — TELEPHONE (OUTPATIENT)
Dept: HEMATOLOGY/ONCOLOGY | Facility: CLINIC | Age: 70
End: 2025-02-06
Payer: MEDICARE

## 2025-02-06 NOTE — TELEPHONE ENCOUNTER
SW returned pt's call. Pt requesting Boost. SW will reserve 1 case of Vanilla Boost plus. Pt will p/u on 2/7/25. SW will remain available.

## 2025-02-07 ENCOUNTER — DOCUMENTATION ONLY (OUTPATIENT)
Dept: HEMATOLOGY/ONCOLOGY | Facility: CLINIC | Age: 70
End: 2025-02-07
Payer: MEDICARE

## 2025-02-07 ENCOUNTER — CLINICAL SUPPORT (OUTPATIENT)
Facility: HOSPITAL | Age: 70
End: 2025-02-07
Payer: MEDICARE

## 2025-02-07 DIAGNOSIS — M54.59 OTHER LOW BACK PAIN: Primary | ICD-10-CM

## 2025-02-07 PROCEDURE — 97813 ACUP 1/> W/ESTIM 1ST 15 MIN: CPT

## 2025-02-07 PROCEDURE — 97814 ACUP 1/> W/ESTIM EA ADDL 15: CPT

## 2025-02-07 NOTE — PROGRESS NOTES
SW provided pt with 1 case of Vanilla Boost plus. No other needs expressed. SW will remain available.

## 2025-02-07 NOTE — PROGRESS NOTES
"  Acupuncture Evaluation Note     Name: Ernie Walton  Clinic Number: [id number]    Traditional Chinese Medicine (TCM) Diagnosis: Qi Stagnation and Blood Stasis  Medical Diagnosis:   Encounter Diagnosis   Name Primary?    Other low back pain Yes        Evaluation Date: 2/7/2025    Visit #/Visits authorized: 3/12    Precautions: Standard and cancer    Subjective     Chief Concern:   Mr. Walton noticed a difference after his first acupuncture treatment. "Strutted out of here."  He is currently using a cane to help him walk for back pain and neuropathy. He also has compression socks now that he is trying.    New blend of chemo - had 6 rounds in [place]    Flares some times - bad with lots of walking  Lumbar pain on (R) side - can't walk, puts pressure on that spot  Hasn't tried heat, no topicals -     1st round of new chemo blend was 3 weeks ago   Seeing Dr. Leo Ng every 3 weeks  Sleeplessness    Woke on Thursday and couldn't walk out to lobby - from [place]   Stayed like that on Thursday until New Years day -   Sores in mouth    Felt weak, hard to move, when appetite came back his mobility came back   Didn't feel nauseous and didn't throw up   But appetite came back  Lost appetite     Saw neurologist in [place], degenerative discs in low back   Right leg is worse than Left leg    Left foot feels "frozen" - cold  Sleeps with a blanket and a snuggie    In September - end of of Sept.   Contrast in [place] - the contrast made him sick - ever since the neuropathy has been worse.    Hands also have neuropathy   Hx of sclerodoma    Was 189, now 175 -   Drinking protein drinks, gave him a case of them -       Medical necessity is demonstrated by the following IMPAIRMENTS: Medical Necessity: Cancer related pain, Decreased mobility limits day to day activities, social, and emergent situations, and Decreased quality of life              Aggravating Factors:  movement and prolonged walking    Relieving Factors:  rest    Symptom " Description:     Quality:  Aching  Severity:  10  Frequency:  when active    Previous Treatments Tried:  Imaging    HEENT:      Chest:      Digestion:     Diet: not asked   Fluids:   Taste/Appetite: okay at the moment, but poor after chemo infusion   Symptoms:     Sleep: improving    Energy Levels:  poor after chemo    Psychological Symptoms:      Other Symptoms:     GYN Symptoms:     Objective     Observation:     Tongue:      Body:     Color:     Coating:      Pulse:        wiry       New Findings:  none    Treatment     Treatment Principles:  move qi and blood stasis    Acupuncture points used:  4 GLYNN and BA JOSE    Bilateral points: Li4, SI3, LU9, ST36, GB40, KI3, UB60,  (Ba Jose with stim -2 sets each foot) , tip of big toe and pinky toe.  Unilateral points:  Auricular Treatment:  apple men, point zero    Needles In: 30  Needles Out: 30  [place] W/ STIM placed: 9:15  Needles W/ STIM removed: 9:55      Other Traditional Chinese Medicine Modalities -  heat lamp    Assessment     After treatment, patient felt good     Patient prognosis is Good.     Patient will continue to benefit from acupuncture treatment to address the deficits listed in the problem list box on initial evaluation, provide patient family education and to maximize pt's level of independence in the home and community environment.     Patient's spiritual, cultural and educational needs considered and pt agreeable to plan of care and goals.     Anticipated barriers to treatment: time between treatments (3 weeks), Mr. Walton lives in [place].    Plan     Recommend 1 /week for 6 sessions then re-assess.      Education:  Patient is aware of cumulative benefit of acupuncture

## 2025-02-13 ENCOUNTER — TELEPHONE (OUTPATIENT)
Dept: HEMATOLOGY/ONCOLOGY | Facility: CLINIC | Age: 70
End: 2025-02-13
Payer: MEDICARE

## 2025-02-13 NOTE — NURSING
----- Message from Jaden Jesus sent at 2/25/2019  9:55 AM CST -----  Please call pt she needs to schedule a annual appt she needs a wed or thur [phone]   Confirmed virtual appt and access for Dr. Malik at 1pm tomorrow. RN Brian conducted chart review for clinic preparations including intake, barriers to care and opportunities for greater evaluation/recommendations by provider, Park Alejo, HELENA.

## 2025-02-14 ENCOUNTER — OFFICE VISIT (OUTPATIENT)
Dept: HEMATOLOGY/ONCOLOGY | Facility: CLINIC | Age: 70
End: 2025-02-14
Payer: MEDICARE

## 2025-02-14 DIAGNOSIS — K12.30 MUCOSITIS: ICD-10-CM

## 2025-02-14 DIAGNOSIS — G62.9 NEUROPATHY: Primary | ICD-10-CM

## 2025-02-14 DIAGNOSIS — C34.32 ADENOCARCINOMA OF LOWER LOBE OF LEFT LUNG: ICD-10-CM

## 2025-02-14 NOTE — PROGRESS NOTES
"Televisit Information  The patient location is: [place]  The chief complaint leading to consultation is:  Neuropathy and Generalized Weakness  Visit type: Virtual Visit   Face-to-face time with patient:  Approximately 25 minutes.     Approximately 25 minutes in total were spent on this encounter, which includes face-to-face time and non-face-to-face time preparing to see the patient (e.g., review of records and tests), obtaining and/or reviewing separately obtained history, documenting clinical information in the electronic or other health record, independently interpreting results (not separately reported) and communicating results to the patient/family/caregiver, or care coordination (not separately reported).  Each patient to whom he or she provides medical services by telemedicine is:  (1) informed of the relationship between the physician and patient and the respective role of any other health care provider with respect to management of the patient; and (2) notified that he or she may decline to receive medical services by telemedicine and may withdraw from such care at any time.       [facility] Initial Visit      Mr. Ernie Walton presents virtually to [facility] for evaluation and recommendations pertaining to his history of lung cancer. He is referred by Dr. Rudi Granados for neuropathy and generalized weakness.     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 He was diagnosed in May of 2024. He is s/p CarboTaxol 6/5/24-9/17/24. Currently receiving Docetaxel and Ramucirumab. Ángel has a history of scleroderma and receives Mycophenolate mofetil, nintnendinab and prednisone under the care of his Rheumatologist in [place].   He reports acupuncture helped with neuropathy and back pain. He also reports it gave him a "pep in his step"   He is starting more chemotherapy on Feb 25.   He reprots his strength is getting better. His brother is in [place] and he " helps care for him.   He sees a Rheumatologist about 1.5 hours away from his house. He is supposed to see him every 3 months and he gave Dr. Bailey paperwork for labs and urinalysis.   Fatigue is getting better everyday.   Appetite came back Tuesday night.   Pertinent history of HTN, HLD, BPH, Recurrent UTI's and DDD.   He reports that he is not allergic to contrast. He has had prior scans with contrast- he would like this addressed on his chart    PCP:   Dexa: None  C-Scope:   Labs: 2/4/25    Cancer/Stage/TNM:   Cancer Staging   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  Staging form: Lung, AJCC 8th Edition  - Clinical: Stage IVB (cT2, cN1, pM1c) - Signed by Rudi Granados IV, MD on 7/8/2024       Oncology History   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   5/15/2024 Imaging Significant Findings    CT C, Non-Con (f/u after imaging for scleroderma clinical trial workup)  - 2.5 x 2.4 x 1.8 cm LLL nodule (previously 2.2 x 2.2 x 1.8 cm, 3/25/24)  - Patchy ground-glass and nodular opacities adjacent to LLL nodule  - 1.4 cm left hilar lymph node  - Right lower paratracheal lymphadenopathy  - Lytic lesion with erosive changes in T2 vertebral body, new lytic lesions in manubrium, right fourth rib, T3, T7, and T11 vertebral bodies with pathologic fracture of superior endplate of T3 vertebral body     5/15/2024 Procedure    Bronch w/ EBUS  LLL TBBx  - Adenocarcinoma (Positive: TTF-1; Negative: p40)    LLL, Core Biopsy  - Adenocarcinoma    LN  - Positive: Station 11L  - Negative: Station 4R, 7, 4L    Caris NGS / PD-L1      - KEAP1, TP53, STK11 mutated  - ERBB2 negative / 1+  - TMB 7  - TORIE       6/5/2024 Imaging Significant Findings    PET CT  - 2.2 cm LLL mass, SUV 4.8  - 1.6 cm left hilar node, SUV 6.8  - 1.3 cm right lower paratracheal node, SUV 2.2  - Hypermetabolic lytic lesions in multiple bones (C7 transverse process, multiple thoracic, lumbar, and sacral segments, sternum, right scapular body, left scapular angle,  several ribs, left ilium, left pubic body, bilateral ischia)     7/5/2024 Imaging Significant Findings    MRI Brain  - LANNY in brain or dura  - Mild enhancement within clivus, cannot rule out bony met     7/8/2024 Initial Diagnosis    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     7/8/2024 Cancer Staged    Staging form: Lung, AJCC 8th Edition  - Clinical: Stage IVB (cT2, cN1, pM1c)     8/9/2024 Imaging Significant Findings    MRI C/T/L Spine  Diffuse osseous metastasis throughout the visualized spine without any evidence of significant compression fracture or retropulsion with involvement of the vertebral bodies and posterior elements at multiple levels.   No evidence of cervical/thoracic spinal cord compression with impingement of the ventral aspect of the cord from adjacent disc disease at multiple levels as discussed above.   Discal disease with foraminal stenosis at multiple levels throughout the spine with most prominent involvement seen at L4-5 and L5-S1 levels mostly degenerative in nature.   No abnormal intraspinal enhancement with no epidural enhancement noted        9/27/2024 Imaging Significant Findings    PET CT (compared against PET CT from 6/5/24)  1.4 x 1.0 cm LLL nodule (previously 2.0 x 1.3 cm), SUV 6.8 (similar)  New fairly defined opacity adjacent to the primary mass measuring up 1.8. The activity overlying the primary tumor is now confluent with the new opacity, leading to increased extent of activity.   1.2 x 2.0 cm L hilar LN (previously 1.4 x 2.2 cm), SUV 5.2  Numerous FDG-avid bone metastases are noted. Although previously visualized lesions show some areas of improvement, there are multiple new active bone metastases (L4, T8). The pattern suggests initial response to treatment with subsequent progressive disease      11/25/2024 Imaging Significant Findings    11/25/24 -- CT C/A/P  2.9 x 2.0 cm spiculated LLL pulmonary nodule  2.2 x 1.9 cm left hilar lymph node  Dependent pulmonary  infiltrates  Extensive osseous metastatic disease (scattered lytic and sclerotic lesions throughout thoracic spine, manubrium, left scapula, pelvis, sacrum, and lumbar spine)     12/23/2024 -  Chemotherapy    Treatment Summary   Plan Name: OP NSCLC ramucirumab DOCEtaxel Q3W   Treatment Goal: Palliative  Status: Active  Start Date: 12/23/2024  End Date: 8/4/2025 (Planned)  Provider: Rudi Granados IV, MD  Chemotherapy: DOCEtaxel (TAXOTERE) 75 mg/m2 = 154 mg in 0.9% NaCl 292.7 mL chemo infusion, 75 mg/m2 = 154 mg, Intravenous, Clinic/HOD 1 time, 3 of 12 cycles  Administration: 154 mg (12/23/2024), 152 mg (1/13/2025), 152 mg (2/4/2025)  ramucirumab (CYRAMZA) 800 mg in 0.9% NaCl 250 mL chemo infusion, 830 mg, Intravenous, Clinic/HOD 1 time, 3 of 12 cycles  Administration: 800 mg (12/23/2024), 800 mg (1/13/2025), 800 mg (2/4/2025)           Past Medical History:   Diagnosis Date    DDD (degenerative disc disease), lumbosacral     Low back pain     Lung cancer         Current Outpatient Medications   Medication Instructions    amLODIPine (NORVASC) 5 MG tablet 1 tablet, Daily    atorvastatin (LIPITOR) 40 MG tablet 1 tablet, Daily    benzonatate (TESSALON) 100 mg, Oral, 3 times daily PRN    cyanocobalamin 1,000 mcg, Every 30 days    diphenhydrAMINE (BENADRYL) 50 MG capsule Take 50mg by mouth 1 hour before contrast administration.    diphenhydrAMINE-aluminum-magnesium hydroxide-simethicone-LIDOcaine viscous HCl 2% 15 mLs, Swish & Spit, Every 4 hours PRN    DULoxetine (CYMBALTA) 60 mg, Oral, Daily    ferrous sulfate (FEOSOL) 325 mg, with breakfast    fluticasone propionate (FLONASE) 50 mcg/actuation nasal spray 1 spray, Daily    folic acid (bulk) 666 mcg    ibuprofen (ADVIL,MOTRIN) 800 mg, Oral, Every 8 hours PRN    Lactobacillus rhamnosus GG (CULTURELLE) 10 billion cell capsule 1 capsule, Every morning    LIDOcaine 5 % Crea SMARTSIG:Sparingly Topical 3 Times Daily    LIDOcaine viscous HCl 2% (XYLOCAINE) 2 % Soln      lisinopriL 10 MG tablet 2 tablets, Daily    magic mouthwash diphen/antac/lidoc 15 mLs, Swish & Spit, Every 4 hours PRN    mupirocin (BACTROBAN) 2 % ointment 1 Application    MYCOPHENOLATE SODIUM ORAL 1,500 mg    nintedanib (OFEV) 100 mg, 2 times daily    omega 3-dha-epa-fish oil 1,000 mg (120 mg-180 mg) Cap 1,000 mg    ondansetron (ZOFRAN-ODT) 4 mg    pantoprazole (PROTONIX) 40 mg    phenazopyridine (PYRIDIUM) 200 mg, 3 times daily PRN    predniSONE (DELTASONE) 50 MG Tab Take 50mg by mouth at 13 hours, 7 hours, and 1 hour before contrast administration.    predniSONE, bulk, Powd 7.5 mg    pregabalin (LYRICA) 75 mg, 2 times daily    raloxifene (EVISTA) 60 mg tablet 1 tablet, Daily    sildenafil (REVATIO) 20 mg Tab 1 tablet, 3 times daily    tamsulosin (FLOMAX) 0.4 mg Cap 1 capsule, Daily    traMADoL (ULTRAM) 50 mg tablet 1 tablet, Every 6 hours PRN    triamcinolone acetonide 0.1% (KENALOG) 0.1 % ointment APPLY OINTMENT TOPICALLY TO AFFECTED AREA TWICE DAILY FOR 30 DAYS, FOR LEFT RING FINGER.    vitamin D (VITAMIN D3) 1000 units Tab Daily    vitamin E, dl, acetate, 90 mg (200 unit) Cap 1 capsule, Every morning    vitamin E 180 Units        No past surgical history on file.                       Physical Exam   There were no vitals taken for this visit.   Wt Readings from Last 3 Encounters:   02/04/25 81.4 kg (179 lb 9 oz)   02/04/25 81.4 kg (179 lb 9 oz)   01/13/25 81.4 kg (179 lb 7.3 oz)     Temp Readings from Last 3 Encounters:   02/04/25 97.3 °F (36.3 °C)   02/04/25 97.1 °F (36.2 °C) (Temporal)   01/13/25 97 °F (36.1 °C)     BP Readings from Last 3 Encounters:   02/04/25 118/75   02/04/25 122/76   01/13/25 106/68     Pulse Readings from Last 3 Encounters:   02/04/25 97   02/04/25 (!) 119   01/13/25 92     Body mass index is 25. Physical exam deferred secondary to virtual visit.     Prior history of vital signs reviewed.       Review of Systems:   Cardiac:           No SOB, chest pain with exertion,edema,  "orthopnea  Distress:          No excessive sadness, no hopelessness, no anhedonia, no excessive worry or nervousness  Cognitive:        No trouble with memory, no difficulty paying attention, no brain fog, no trouble functioning with work or home life  Fatigue:           Energy level adequate, performing ADL's, no morning fatigue                           Fatigue  1  / 10  ( Scale 0 - 10)   Hormonal:       No hot flashes, no night sweats  Pain:                Has  pain,  location:back                         Neuropathy:    No numbness, no tingling, no paresthesia   Sleep:              No difficulty falling asleep, no waking up in night, no daytime sleepiness, no snoring  Altered function:          No problems with money management,  no problems with daily organization & planning  Weight:           No concerns with weight, wants to lose a little and maintain healthy        Labs:   Lab Results   Component Value Date    WBC 10.41 02/04/2025    HGB 9.3 (L) 02/04/2025    HCT 32.0 (L) 02/04/2025    MCV 87 02/04/2025     02/04/2025           No results found for: "HGBA1C"   T3,T4,T7 and TSH   Vitamin d level  Vitamin b-12       Assessment:   Lung Cancer  Bone Metastases  Neuropathy      Plan   Nutrition: Continue to encourage plant based diet; given risk factors of Cardiac Disease and Scleroderma  discussed anti-inflammatory diet as well as foods to decrease effects of diabetes and prediabetes.      Exercise: Recommend 60 minutes of gentle movement/light activity per day (cleaning, walking, cooking, gardening, stationary bike). Recommend some type of cardiovascular activity daily if well.     Refer to Acupuncture For Chronic Low Back Pain- continue acupuncture in [facility] for Weakness, Decreased Functional Status, Fatigue, Neuropathy, Arthralgias, and Low Back Pain - too many appointments currently to add this  Dietitian for Malnutrition, DM/Pre DM, and Encounter Chemo/Immunotherapy.- met with " Laura    Recommended Vitamins/Supplements:  Vitamin D    Message sent to Dr. Bailey   Total time 25 [id number] ace to face,review of medical record and arranging follow up

## 2025-02-24 ENCOUNTER — HOSPITAL ENCOUNTER (OUTPATIENT)
Dept: RADIOLOGY | Facility: HOSPITAL | Age: 70
Discharge: HOME | End: 2025-02-24
Attending: HOSPITALIST
Payer: MEDICARE

## 2025-02-24 DIAGNOSIS — C34.32 ADENOCARCINOMA OF LOWER LOBE OF LEFT LUNG: ICD-10-CM

## 2025-02-24 PROCEDURE — 71260 CT THORAX DX C+: CPT | Mod: 26,,, | Performed by: RADIOLOGY

## 2025-02-24 PROCEDURE — 25500020 PHARM REV CODE 255: Performed by: HOSPITALIST

## 2025-02-24 PROCEDURE — 74177 CT ABD & PELVIS W/CONTRAST: CPT | Mod: TC

## 2025-02-24 PROCEDURE — 74177 CT ABD & PELVIS W/CONTRAST: CPT | Mod: 26,,, | Performed by: RADIOLOGY

## 2025-02-24 RX ADMIN — IOHEXOL 100 ML: 350 INJECTION, SOLUTION INTRAVENOUS at 04:02

## 2025-02-24 RX ADMIN — IOHEXOL 30 ML: 350 INJECTION, SOLUTION INTRAVENOUS at 04:02

## 2025-02-25 ENCOUNTER — INFUSION (OUTPATIENT)
Dept: INFUSION THERAPY | Facility: HOSPITAL | Age: 70
End: 2025-02-25
Attending: HOSPITALIST
Payer: MEDICARE

## 2025-02-25 ENCOUNTER — CLINICAL SUPPORT (OUTPATIENT)
Facility: HOSPITAL | Age: 70
End: 2025-02-25
Attending: HOSPITALIST
Payer: MEDICARE

## 2025-02-25 ENCOUNTER — OFFICE VISIT (OUTPATIENT)
Dept: HEMATOLOGY/ONCOLOGY | Facility: CLINIC | Age: 70
End: 2025-02-25
Payer: MEDICARE

## 2025-02-25 VITALS
SYSTOLIC BLOOD PRESSURE: 125 MMHG | TEMPERATURE: 97 F | HEART RATE: 92 BPM | OXYGEN SATURATION: 97 % | DIASTOLIC BLOOD PRESSURE: 84 MMHG | RESPIRATION RATE: 16 BRPM

## 2025-02-25 VITALS
SYSTOLIC BLOOD PRESSURE: 131 MMHG | HEART RATE: 117 BPM | OXYGEN SATURATION: 94 % | BODY MASS INDEX: 25.04 KG/M2 | TEMPERATURE: 97 F | DIASTOLIC BLOOD PRESSURE: 80 MMHG | WEIGHT: 178.88 LBS | HEIGHT: 71 IN

## 2025-02-25 DIAGNOSIS — C34.32 ADENOCARCINOMA OF LOWER LOBE OF LEFT LUNG: Primary | ICD-10-CM

## 2025-02-25 DIAGNOSIS — M34.9 SCLERODERMA: ICD-10-CM

## 2025-02-25 DIAGNOSIS — R07.89 CHEST WALL PAIN: ICD-10-CM

## 2025-02-25 DIAGNOSIS — R05.9 COUGH, UNSPECIFIED TYPE: ICD-10-CM

## 2025-02-25 DIAGNOSIS — G62.0 CHEMOTHERAPY-INDUCED PERIPHERAL NEUROPATHY: ICD-10-CM

## 2025-02-25 DIAGNOSIS — C77.1 SECONDARY MALIGNANT NEOPLASM OF INTRATHORACIC LYMPH NODES: ICD-10-CM

## 2025-02-25 DIAGNOSIS — M54.59 OTHER LOW BACK PAIN: Primary | ICD-10-CM

## 2025-02-25 DIAGNOSIS — K76.9 LIVER LESION: ICD-10-CM

## 2025-02-25 DIAGNOSIS — D72.829 LEUKOCYTOSIS, UNSPECIFIED TYPE: ICD-10-CM

## 2025-02-25 DIAGNOSIS — T45.1X5A CHEMOTHERAPY-INDUCED PERIPHERAL NEUROPATHY: ICD-10-CM

## 2025-02-25 DIAGNOSIS — C79.51 SECONDARY MALIGNANT NEOPLASM OF BONE: ICD-10-CM

## 2025-02-25 DIAGNOSIS — K12.30 MUCOSITIS: ICD-10-CM

## 2025-02-25 PROCEDURE — A4216 STERILE WATER/SALINE, 10 ML: HCPCS | Performed by: HOSPITALIST

## 2025-02-25 PROCEDURE — 96375 TX/PRO/DX INJ NEW DRUG ADDON: CPT

## 2025-02-25 PROCEDURE — 63600175 PHARM REV CODE 636 W HCPCS: Performed by: HOSPITALIST

## 2025-02-25 PROCEDURE — 25000003 PHARM REV CODE 250: Performed by: HOSPITALIST

## 2025-02-25 PROCEDURE — 96367 TX/PROPH/DG ADDL SEQ IV INF: CPT

## 2025-02-25 PROCEDURE — 99215 OFFICE O/P EST HI 40 MIN: CPT | Mod: S$PBB,,, | Performed by: HOSPITALIST

## 2025-02-25 PROCEDURE — 97813 ACUP 1/> W/ESTIM 1ST 15 MIN: CPT

## 2025-02-25 PROCEDURE — 96417 CHEMO IV INFUS EACH ADDL SEQ: CPT

## 2025-02-25 PROCEDURE — 99999 PR PBB SHADOW E&M-EST. PATIENT-LVL V: CPT | Mod: PBBFAC,,, | Performed by: HOSPITALIST

## 2025-02-25 PROCEDURE — 97814 ACUP 1/> W/ESTIM EA ADDL 15: CPT

## 2025-02-25 PROCEDURE — 99215 OFFICE O/P EST HI 40 MIN: CPT | Mod: PBBFAC,25 | Performed by: HOSPITALIST

## 2025-02-25 PROCEDURE — 96413 CHEMO IV INFUSION 1 HR: CPT

## 2025-02-25 PROCEDURE — G2211 COMPLEX E/M VISIT ADD ON: HCPCS | Mod: S$PBB,,, | Performed by: HOSPITALIST

## 2025-02-25 RX ORDER — CODEINE PHOSPHATE AND GUAIFENESIN 10; 100 MG/5ML; MG/5ML
5 SOLUTION ORAL 3 TIMES DAILY PRN
Qty: 237 ML | Refills: 0 | Status: SHIPPED | OUTPATIENT
Start: 2025-02-25 | End: 2025-03-13

## 2025-02-25 RX ORDER — DIPHENHYDRAMINE HYDROCHLORIDE 50 MG/ML
25 INJECTION INTRAMUSCULAR; INTRAVENOUS
Status: CANCELLED
Start: 2025-02-25

## 2025-02-25 RX ORDER — HEPARIN 100 UNIT/ML
500 SYRINGE INTRAVENOUS
Status: CANCELLED | OUTPATIENT
Start: 2025-02-25

## 2025-02-25 RX ORDER — DIPHENHYDRAMINE HYDROCHLORIDE 50 MG/ML
25 INJECTION INTRAMUSCULAR; INTRAVENOUS
Status: COMPLETED | OUTPATIENT
Start: 2025-02-25 | End: 2025-02-25

## 2025-02-25 RX ORDER — HEPARIN 100 UNIT/ML
500 SYRINGE INTRAVENOUS
Status: DISCONTINUED | OUTPATIENT
Start: 2025-02-25 | End: 2025-02-25 | Stop reason: HOSPADM

## 2025-02-25 RX ORDER — SODIUM CHLORIDE 0.9 % (FLUSH) 0.9 %
10 SYRINGE (ML) INJECTION
Status: DISCONTINUED | OUTPATIENT
Start: 2025-02-25 | End: 2025-02-25 | Stop reason: HOSPADM

## 2025-02-25 RX ORDER — SODIUM CHLORIDE 0.9 % (FLUSH) 0.9 %
10 SYRINGE (ML) INJECTION
Status: CANCELLED | OUTPATIENT
Start: 2025-02-25

## 2025-02-25 RX ORDER — PROCHLORPERAZINE EDISYLATE 5 MG/ML
5 INJECTION INTRAMUSCULAR; INTRAVENOUS ONCE AS NEEDED
Status: CANCELLED | OUTPATIENT
Start: 2025-02-25

## 2025-02-25 RX ORDER — DIPHENHYDRAMINE HYDROCHLORIDE 50 MG/ML
50 INJECTION INTRAMUSCULAR; INTRAVENOUS ONCE AS NEEDED
Status: CANCELLED | OUTPATIENT
Start: 2025-02-25

## 2025-02-25 RX ORDER — EPINEPHRINE 0.3 MG/.3ML
0.3 INJECTION SUBCUTANEOUS ONCE AS NEEDED
Status: CANCELLED | OUTPATIENT
Start: 2025-02-25

## 2025-02-25 RX ORDER — LIDOCAINE 50 MG/G
1 PATCH TOPICAL DAILY
Qty: 10 PATCH | Refills: 0 | Status: SHIPPED | OUTPATIENT
Start: 2025-02-25 | End: 2026-02-25

## 2025-02-25 RX ADMIN — HEPARIN SODIUM (PORCINE) LOCK FLUSH IV SOLN 100 UNIT/ML 500 UNITS: 100 SOLUTION at 04:02

## 2025-02-25 RX ADMIN — SODIUM CHLORIDE 20 MG: 9 INJECTION, SOLUTION INTRAVENOUS at 02:02

## 2025-02-25 RX ADMIN — DIPHENHYDRAMINE HYDROCHLORIDE 25 MG: 50 INJECTION INTRAMUSCULAR; INTRAVENOUS at 02:02

## 2025-02-25 RX ADMIN — SODIUM CHLORIDE 800 MG: 9 INJECTION, SOLUTION INTRAVENOUS at 02:02

## 2025-02-25 RX ADMIN — DOCETAXEL 152 MG: 20 INJECTION, SOLUTION, CONCENTRATE INTRAVENOUS at 03:02

## 2025-02-25 RX ADMIN — Medication 10 ML: at 04:02

## 2025-02-25 NOTE — PROGRESS NOTES
"  Acupuncture Evaluation Note     Name: Ernie Walton  Clinic Number: [id number]    Traditional Chinese Medicine (TCM) Diagnosis: Qi Stagnation and Blood Stasis  Medical Diagnosis:   Encounter Diagnosis   Name Primary?    Other low back pain Yes        Evaluation Date: 2/25/2025    Visit #/Visits authorized: 4/12    Precautions: Standard and cancer    Subjective     Chief Concern:   Neuropathy has much improved, still there, but not as reactive to cold.  The rain bothered him more than the cold front.  Cough with yellow mucus. Pain with coughing in ribs.   Pain in shoulder joints bilaterally  Pain radiating from sternum to neck    - - - - -     Mr. Walton noticed a difference after his first acupuncture treatment. "Strutted out of here."  He is currently using a cane to help him walk for back pain and neuropathy. He also has compression socks now that he is trying.    New blend of chemo - had 6 rounds in [place]    Flares some times - bad with lots of walking  Lumbar pain on (R) side - can't walk, puts pressure on that spot  Hasn't tried heat, no topicals -     1st round of new chemo blend was 3 weeks ago   Seeing Dr. Leo Ng every 3 weeks  Sleeplessness    Woke on Thursday and couldn't walk out [place] [employer] [place]   Stayed like that on Thursday until New Years day -   Sores in mouth    Felt weak, hard to move, when appetite came back his mobility came back   Didn't feel nauseous and didn't throw up   But appetite came back  Lost appetite     Saw neurologist in [place], degenerative discs in low back   Right leg is worse than Left leg    Left foot feels "frozen" - cold  Sleeps with a blanket and a snuggie    In September - end of of Sept.   Contrast in [place] - the contrast made him sick - ever since the neuropathy has been worse.    Hands also have neuropathy   Hx of sclerodoma    Was 189, now 175 -   Drinking protein drinks, gave him a case of them -       Medical necessity is demonstrated by the following " IMPAIRMENTS: Medical Necessity: Cancer related pain, Decreased mobility limits day to day activities, social, and emergent situations, and Decreased quality of life              Aggravating Factors:  movement and prolonged walking    Relieving Factors:  rest    Symptom Description:     Quality:  Aching  Severity:  10  Frequency:  when active    Previous Treatments Tried:  Imaging    HEENT:      Chest:      Digestion:     Diet: not asked   Fluids:   Taste/Appetite: okay at the moment, but poor after chemo infusion   Symptoms:     Sleep: improving    Energy Levels:  poor after chemo    Psychological Symptoms:      Other Symptoms:     GYN Symptoms:     Objective     Observation:     Tongue:      Body:     Color:     Coating:      Pulse:        wiry       New Findings:  none    Treatment     Treatment Principles:  move qi and blood stasis    Acupuncture points used:  4 GLYNN and BA JOSE    Bilateral points: Li4, SI3, ST36, SP9, SP6, GB40, UB60,  (Ba Jose with stim -2 sets each foot) , tip of big toe and pinky toe.  Unilateral points:  Auricular Treatment:  apple men, lumbar    Needles In: 26  Needles Out: 26  [place] W/ STIM placed: 11:45  Needles W/ STIM removed: 12:15      Other Traditional Chinese Medicine Modalities -  heat lamp    Assessment     After treatment, patient felt good     Patient prognosis is Good.     Patient will continue to benefit from acupuncture treatment to address the deficits listed in the problem list box on initial evaluation, provide patient family education and to maximize pt's level of independence in the home and community environment.     Patient's spiritual, cultural and educational needs considered and pt agreeable to plan of care and goals.     Anticipated barriers to treatment: time between treatments (3 weeks), Mr. Walton lives in [place].    Plan     Recommend 1 /week for 6 sessions then re-assess.      Education:  Patient is aware of cumulative benefit of acupuncture

## 2025-02-25 NOTE — PLAN OF CARE
Problem: Adult Inpatient Plan of Care  Goal: Plan of Care Review  Outcome: Progressing  Goal: Optimal Comfort and Wellbeing  Outcome: Progressing     Problem: Chemotherapy Effects  Goal: Absence of Infection  Outcome: Progressing

## 2025-02-25 NOTE — PROGRESS NOTES
[facility] MEDICAL ONCOLOGY - FOLLOW UP VISIT    Reason for visit: Second opinion, Stage IV Adenocarcinoma of the Lung      Oncology History   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   5/15/2024 Imaging Significant Findings    CT C, Non-Con (f/u after imaging for scleroderma clinical trial workup)  - 2.5 x 2.4 x 1.8 cm LLL nodule (previously 2.2 x 2.2 x 1.8 cm, 3/25/24)  - Patchy ground-glass and nodular opacities adjacent to LLL nodule  - 1.4 cm left hilar lymph node  - Right lower paratracheal lymphadenopathy  - Lytic lesion with erosive changes in T2 vertebral body, new lytic lesions in manubrium, right fourth rib, T3, T7, and T11 vertebral bodies with pathologic fracture of superior endplate of T3 vertebral body     5/15/2024 Procedure    Bronch w/ EBUS  LLL TBBx  - Adenocarcinoma (Positive: TTF-1; Negative: p40)    LLL, Core Biopsy  - Adenocarcinoma    LN  - Positive: Station 11L  - Negative: Station 4R, 7, 4L    Caris NGS / PD-L1      - KEAP1, TP53, STK11 mutated  - ERBB2 negative / 1+  - TMB 7  - TORIE       6/5/2024 Imaging Significant Findings    PET CT  - 2.2 cm LLL mass, SUV 4.8  - 1.6 cm left hilar node, SUV 6.8  - 1.3 cm right lower paratracheal node, SUV 2.2  - Hypermetabolic lytic lesions in multiple bones (C7 transverse process, multiple thoracic, lumbar, and sacral segments, sternum, right scapular body, left scapular angle, several ribs, left ilium, left pubic body, bilateral ischia)     7/5/2024 Imaging Significant Findings    MRI Brain  - LANNY in brain or dura  - Mild enhancement within clivus, cannot rule out bony met     7/8/2024 Initial Diagnosis    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     7/8/2024 Cancer Staged    Staging form: Lung, AJCC 8th Edition  - Clinical: Stage IVB (cT2, cN1, pM1c)     8/9/2024 Imaging Significant Findings    MRI C/T/L Spine  Diffuse osseous metastasis throughout the visualized spine without any evidence of significant  compression fracture or retropulsion with involvement of the vertebral bodies and posterior elements at multiple levels.   No evidence of cervical/thoracic spinal cord compression with impingement of the ventral aspect of the cord from adjacent disc disease at multiple levels as discussed above.   Discal disease with foraminal stenosis at multiple levels throughout the spine with most prominent involvement seen at L4-5 and L5-S1 levels mostly degenerative in nature.   No abnormal intraspinal enhancement with no epidural enhancement noted        9/27/2024 Imaging Significant Findings    PET CT (compared against PET CT from 6/5/24)  1.4 x 1.0 cm LLL nodule (previously 2.0 x 1.3 cm), SUV 6.8 (similar)  New fairly defined opacity adjacent to the primary mass measuring up 1.8. The activity overlying the primary tumor is now confluent with the new opacity, leading to increased extent of activity.   1.2 x 2.0 cm L hilar LN (previously 1.4 x 2.2 cm), SUV 5.2  Numerous FDG-avid bone metastases are noted. Although previously visualized lesions show some areas of improvement, there are multiple new active bone metastases (L4, T8). The pattern suggests initial response to treatment with subsequent progressive disease      11/25/2024 Imaging Significant Findings    11/25/24 -- CT C/A/P  2.9 x 2.0 cm spiculated LLL pulmonary nodule  2.2 x 1.9 cm left hilar lymph node  Dependent pulmonary infiltrates  Extensive osseous metastatic disease (scattered lytic and sclerotic lesions throughout thoracic spine, manubrium, left scapula, pelvis, sacrum, and lumbar spine)     12/23/2024 -  Chemotherapy    Docetaxel/Ramucirumab  21 day cycle  Docetaxel 75 mg/m2  Ramucirumab 10 mg/kg     2/24/2025 Imaging Significant Findings    CT C/A/P  Stable 2.6 x 2.6 LLL nodule, previously 2.5 x 2.7 cm  Stable 1.8 x 1.5 cm left hilar lymph node, previously 2.0 x 1.5 cm  New 1.1 x 0.9 cm right hepatic lobe lesion  Stable scattered areas of osteo sclerotic  "disease        Previous Workup:   - 6/5/24:  Medical oncology (Dr. Erik Clay), discussed carboplatin/paclitaxel, plan to proceed  - 6/5/24: C1D1 carboplatin/paclitaxel  - 6/26/24: C2D1 carboplatin/paclitaxel    Oncology History    HPI:     Ernie Walton is a 69 y.o. male with pmh significant for systemic sclerosis/scleroderma (dx'd 2020, on mycophenolate mofetil, prednisone, and nintendinab), HTN, HLD, BPH, h/o nephrolithiasis w/ recurrent UTI, and DDD who presents for f/u of the below lung cancer. His primary oncologist has been Dr. Erik Clay, and he has established care with Dr. Holley ([facility]).    1)  Stage IVB ([id number]) adenocarcinoma of the LLL (PD-L1 TPS 1%, no targetable mutations, STK11/TP53/KEAP1 co-mutations) w/ diffuse osseous disease , initially dx'd 5/15/24, s/p carboplatin/paclitaxel (6/5/24 - 9/17/24), radiographic evidence of progression in the left lung and skeleton, currently on 2L docetaxel/ramucirumab (12/23/24-present)    Last clinic 2/4/25, proceed with C3 docetaxel/ramucirumab.  CT C/A/P prior to RTC.  C4 docetaxel/ramucirumab on 2/25/25.  Treatment at [facility] going forward.  Dental clearance pending for bone mets.    Interval History:  2/4/25: C3 docetaxel/ramucirumab    The pt states that he is not doing well today. He says "something's going on with my upper chest". He describes pain over his chest wall and nape of his neck. The pain is constant. It is described as "like muscle soreness". It is worse with deep breath as well as moving his arms (reaching out with his phone, reaching above his head, etc). He feels that this developed with coughing (see below), but otherwise denies any antecedent trauma or atypical activities. He says that oral diclofenac has helped with this pain; he says that topical diclofenac did not help. He says that ibuprofen did not help, and a high dose of prednisone helped a little. He has been having this for ~2 weeks.     He says that his cough is largely " "unchanged. He has tried benzonatate and mucinex DM to no avail.     He says that his mucositis has improved significantly. He did go to the dentist where he was told that he had "shards" at an extraction site. This was cleaned out and he has been given an antibiotic which he applies locally with every meal. He says that he is to continue using this until he follows up with her on 3/23/25. He was denied dental clearance for osteoclast inhibitors until this issue has been resolved.     The Friday after chemotherapy, he had an episode of emesis on the way home from acupuncture. He otherwise denies any nausea or vomiting.     He describes ~1 week of fatigue, which usually starts around 1 week after treatment.     His appetite remains robust.     He says "oh yea" when asked if the duloxetine has been helping his neuropathy. He says that he is able to feel the acupuncture needles in areas of his feet he wasn't previously able to feel them.    Pt denies diarrhea, constipation, rash, new/worsening fatigue, new/worsening SOB.      ROS:   As per HPI.       Physical Exam:       There were no vitals taken for this visit.               Physical Exam  Constitutional:       Appearance: Normal appearance.   HENT:      Head: Normocephalic and atraumatic.   Eyes:      Extraocular Movements: Extraocular movements intact.      Conjunctiva/sclera: Conjunctivae normal.      Pupils: Pupils are equal, round, and reactive to light.   Cardiovascular:      Rate and Rhythm: Normal rate and regular rhythm.      Heart sounds: No murmur heard.     No friction rub. No gallop.   Pulmonary:      Effort: Pulmonary effort is normal.      Breath sounds: No wheezing, rhonchi or rales.   Musculoskeletal:         General: Normal range of motion.      Right lower leg: No edema.      Left lower leg: No edema.      Comments: Sclerodactyly evident. Mild tenderness to palpation over chest wall or upper back.    Skin:     General: Skin is warm and dry.      " Comments: Dystrophic L thumb nail   Neurological:      Mental Status: He is alert and oriented to person, place, and time.   Psychiatric:         Mood and Affect: Mood normal.         Thought Content: Thought content normal.         Judgment: Judgment normal.           Imaging:    See oncologic history above.     Path:  See oncologic history above.      Assessment and Plan:     Ernie Walton is a 69 y.o. male with pmh significant for systemic sclerosis/scleroderma (dx'd 2020, on mycophenolate mofetil, prednisone, and nintendinab), HTN, HLD, BPH, h/o nephrolithiasis w/ recurrent UTI, and DDD who presents for f/u of the below lung cancer. His primary oncologist has been Dr. Erik Clay, and he has established care with Dr. Holley ([facility]).    1)  Stage IVB ([id number]) adenocarcinoma of the LLL (PD-L1 TPS 1%, no targetable mutations, STK11/TP53/KEAP1 co-mutations) w/ diffuse osseous disease , initially dx'd 5/15/24, s/p carboplatin/paclitaxel (6/5/24 - 9/17/24), radiographic evidence of progression in the left lung and skeleton, currently on 2L docetaxel/ramucirumab (12/23/24-present)    Stage IVB Adenocarcinoma of the LLL. PD-L1 1%, No Targetable Mutations  ECOG PS 1.  Patient is currently on docetaxel/ramucirumab, treatment complicated by mucositis as well as fatigue and decreased appetite (C1 only).  His most recent imaging (CT C/A/P, 2/24/25, after 3 cycles of docetaxel/ramucirumab) demonstrates stable LLL nodule (2.6 x 2.6 cm), stable left hilar lymph node, 1.8 x 1.5 cm), stable scattered areas of osseous disease, and a new 1.1 x 0.9 cm right hepatic lobe lesion.  Regarding the former, these lesions had notably demonstrated growth between scans on 9/27/24 and 11/25/24, indicating likely treatment effect in these areas given stability now.  Regarding the hepatic lobe lesion, this is concerning for development of possible metastatic disease, however given control elsewhere need to rule out other process.  In the interim,  given good tolerance will continue with docetaxel/ramucirumab per the REVEL trial. Should this represent a solitary site of progression, would refer for evaluation for palliative radiation to this oligoprogressive lesion recognizing relatively poor third line therapy options.     Of note, patient previously discussed with the patient's rheumatologist Dr. Cordero who is in agreement to with holding immunotherapy in the setting of patient's significant autoimmune conditions.    PLAN  Proceed with C4 docetaxel/ramucirumab today (2/25/25), labs acceptable (see below for white count) and treatment signed  MRI abdomen for further evaluation of hepatic lesions  Labs (CBC, CMP, UA), RTC, and C5 docetaxel/ramucirumab on 3/18/25. Patient would like all treatment at [facility] going forward to align with acupuncture  Referral to palliative care given stage IV disease       Leukocytosis  WBC 18.1 w/ ANC 15.5 on 2/24/25. Pt denies fevers, chills, new/worsening cough, skin rash/lesion, dysuria, diarrhea, headache, or other signs/symptoms of local infectious process.  Oral examination without obvious exudate or erythema; pt unsure what topical medication he was prescribed by his dentist. He notes that he did take 2 doses of 50 mg prednisone in attempt to help his pain immediately prior to blood draw.  May represent demargination due to steroids.   Okay to proceed with treatment as above      Chest Wall / Upper Back Pain  See HPI for description.  Appears most likely musculoskeletal in nature, noting this is worse after coughing, mildly tender to palpation on exam, and worse with reaching his arms out or upwards.   Trial of lidocaine patches  Pt prefers to avoid opioid pain medications as able  Referral to palliative care      Peripheral Neuropathy  See previous note for description. Given timeline, most consistent with chemotherapy induced PN. Pt previously on pregabalin to minimal effect, transitioned to duloxetine. Pt endorses  significant improvement since starting both duloxetine and starting acupuncture, noting he is able to feel acupuncture in parts of his feet which were previously insensate.   Continue duloxetine 60 mg daily  Continue acupuncture      Bone Mets  Pt w/ diffuse osseous disease involving the C/T/L/S spine, sternum, R scapular body, L scapular angle, several ribs, L ilium, L pubic body, and b/l ischia. Pt was previously on xgeva, last dose on 9/17/24. On discussion with Dr. Clay's staff, the patient did NOT receive dental clearance in advance of this. The pt confirms this, and previously noted that he was planning to undergo a dental extraction in 1/2025. He has since had two teeth extracted  He saw his dentist on 2/11/25 and has not been cleared yet, pending ongoing healing of his previous extraction site; he has a follow up on 3/23/25 to discuss further. Discussed need to hold on further osteoclast inhibition in advance of this. Upon dental clearance, will transition to zometa q12w due to distance from .   Dental clearance pending      Scleroderma  Currently under the care of her rheumatologist in [place], on mycophenolate mofetil, nintendinab, and prednisone.  Active treatment precludes safe utilization of immunotherapy. Discussed with patient's primary rheumatologist Dr. Cordero, who both agreed with holding immunotherapy as well as with proceeding with ramucirumab.       Mucositis  Resolved.   CTM      Cough  Likely due to lung cancer.   Trial of guaifenesin-codeine      The above information has been reviewed with the patient and all questions have been answered to their apparent satisfaction.  They understand that they can call the clinic with any questions.    Rudi Granados MD  Hematology/Oncology  [facility]      Route Chart for Scheduling    Med Onc Chart Routing      Follow up with physician . MRI abdomen now.  Labs (CBC, CMP, UA), RTC, and C5 docetaxel/ramucirumab on 3/18/25.  Patient would like all treatment at [facility] going forward to align with acupuncture   Follow up with ADRIANNA    Infusion scheduling note    Injection scheduling note    Labs    Imaging    Pharmacy appointment    Other referrals         Palliative care                 Disclaimer: This note was prepared using voice recognition system and is likely to have sound alike errors.  Please call me with any questions.

## 2025-02-28 ENCOUNTER — CLINICAL SUPPORT (OUTPATIENT)
Facility: HOSPITAL | Age: 70
End: 2025-02-28
Payer: MEDICARE

## 2025-02-28 DIAGNOSIS — M54.59 OTHER LOW BACK PAIN: Primary | ICD-10-CM

## 2025-02-28 PROCEDURE — 97813 ACUP 1/> W/ESTIM 1ST 15 MIN: CPT

## 2025-02-28 PROCEDURE — 97814 ACUP 1/> W/ESTIM EA ADDL 15: CPT

## 2025-02-28 NOTE — PROGRESS NOTES
"  Acupuncture Evaluation Note     Name: Ernie Walton  Clinic Number: [id number]    Traditional Chinese Medicine (TCM) Diagnosis: Qi Stagnation and Blood Stasis  Medical Diagnosis:   Encounter Diagnosis   Name Primary?    Other low back pain Yes        Evaluation Date: 2/28/2025    Visit #/Visits authorized: 5/12    Precautions: Standard and cancer    Subjective     Chief Concern:   Neuropathy has much improved, still there, but not as reactive to cold.  The rain bothered him more than the cold front.  Cough with yellow mucus. Pain with coughing in ribs.   Pain in shoulder joints bilaterally  Pain radiating from sternum to neck    - - - - -     Mr. Walton noticed a difference after his first acupuncture treatment. "Strutted out of here."  He is currently using a cane to help him walk for back pain and neuropathy. He also has compression socks now that he is trying.    New blend of chemo - had 6 rounds in [place]    Flares some times - bad with lots of walking  Lumbar pain on (R) side - can't walk, puts pressure on that spot  Hasn't tried heat, no topicals -     1st round of new chemo blend was 3 weeks ago   Seeing Dr. Leo Ng every 3 weeks  Sleeplessness    Woke on Thursday and couldn't walk out to [place] - from [place]   Stayed like that on Thursday until New Years day -   Sores in mouth    Felt weak, hard to move, when appetite came back his mobility came back   Didn't feel nauseous and didn't throw up   But appetite came back  Lost appetite     Saw neurologist in [place], degenerative discs in low back   Right leg is worse than Left leg    Left foot feels "frozen" - cold  Sleeps with a blanket and a snuggie    In September - end of of Sept.   Contrast in [place] - the contrast made him sick - ever since the neuropathy has been worse.    Hands also have neuropathy   Hx of sclerodoma    Was 189, now 175 -   Drinking protein drinks, gave him a case of them -       Medical necessity is demonstrated by the following " IMPAIRMENTS: Medical Necessity: Cancer related pain, Decreased mobility limits day to day activities, social, and emergent situations, and Decreased quality of life              Aggravating Factors:  movement and prolonged walking    Relieving Factors:  rest    Symptom Description:     Quality:  Aching  Severity:  10  Frequency:  when active    Previous Treatments Tried:  Imaging    HEENT:      Chest:      Digestion:     Diet: not asked   Fluids:   Taste/Appetite: okay at the moment, but poor after chemo infusion   Symptoms:     Sleep: improving    Energy Levels:  poor after chemo    Psychological Symptoms:      Other Symptoms:     GYN Symptoms:     Objective     Observation:     Tongue:      Body:     Color:     Coating:      Pulse:        wiry       New Findings:  none    Treatment     Treatment Principles:  move qi and blood stasis    Acupuncture points used:  4 GLYNN and BA JOSE    Bilateral points: Li4, SI3, luoshen, ST36, SP9, SP6, GB40 (Ba Jose with stim -2 sets each foot)   Unilateral points:  Auricular Treatment:  apple men, lumbar    Needles In: 28  Needles Out: 28  [place] W/ STIM placed: 10:35  Needles W/ STIM removed: 11:05      Other Traditional Chinese Medicine Modalities -  heat lamp    Assessment     After treatment, patient felt good     Patient prognosis is Good.     Patient will continue to benefit from acupuncture treatment to address the deficits listed in the problem list box on initial evaluation, provide patient family education and to maximize pt's level of independence in the home and community environment.     Patient's spiritual, cultural and educational needs considered and pt agreeable to plan of care and goals.     Anticipated barriers to treatment: time between treatments (3 weeks), Mr. Walton lives in [place].    Plan     Recommend 1 /week for 6 sessions then re-assess.      Education:  Patient is aware of cumulative benefit of acupuncture

## 2025-03-05 ENCOUNTER — HOSPITAL ENCOUNTER (OUTPATIENT)
Dept: RADIOLOGY | Facility: HOSPITAL | Age: 70
Discharge: HOME | End: 2025-03-05
Attending: HOSPITALIST
Payer: MEDICARE

## 2025-03-05 DIAGNOSIS — K76.9 LIVER LESION: ICD-10-CM

## 2025-03-05 PROCEDURE — A9581 GADOXETATE DISODIUM INJ: HCPCS | Mod: PN | Performed by: HOSPITALIST

## 2025-03-05 PROCEDURE — 74183 MRI ABD W/O CNTR FLWD CNTR: CPT | Mod: 26,,, | Performed by: RADIOLOGY

## 2025-03-05 PROCEDURE — 74183 MRI ABD W/O CNTR FLWD CNTR: CPT | Mod: TC,PN

## 2025-03-05 PROCEDURE — 25500020 PHARM REV CODE 255: Mod: PN | Performed by: HOSPITALIST

## 2025-03-05 RX ADMIN — GADOXETATE DISODIUM 8 ML: 181.43 INJECTION, SOLUTION INTRAVENOUS at 12:03

## 2025-03-10 ENCOUNTER — TELEPHONE (OUTPATIENT)
Dept: HEMATOLOGY/ONCOLOGY | Facility: CLINIC | Age: 70
End: 2025-03-10
Payer: MEDICARE

## 2025-03-10 DIAGNOSIS — R05.9 COUGH, UNSPECIFIED TYPE: ICD-10-CM

## 2025-03-10 DIAGNOSIS — C34.32 ADENOCARCINOMA OF LOWER LOBE OF LEFT LUNG: Primary | ICD-10-CM

## 2025-03-10 RX ORDER — CODEINE PHOSPHATE AND GUAIFENESIN 10; 100 MG/5ML; MG/5ML
5 SOLUTION ORAL 3 TIMES DAILY PRN
Qty: 237 ML | Refills: 0 | Status: SHIPPED | OUTPATIENT
Start: 2025-03-10 | End: 2025-03-26

## 2025-03-10 NOTE — TELEPHONE ENCOUNTER
Contacted the patient regarding the MRI abdomen results, including the concern for a solitary site of metastatic disease in the liver. Given relatively few remaining systemic therapy options, and stable disease elsewhere, will refer to radiation oncology for evaluation of possible palliative radiation therapy to this lesion and continuation of docetaxel/ramucirumab otherwise.

## 2025-03-12 ENCOUNTER — INITIAL CONSULT (OUTPATIENT)
Dept: RADIATION ONCOLOGY | Facility: CLINIC | Age: 70
End: 2025-03-12
Payer: MEDICARE

## 2025-03-12 VITALS
BODY MASS INDEX: 24.57 KG/M2 | HEART RATE: 128 BPM | TEMPERATURE: 99 F | DIASTOLIC BLOOD PRESSURE: 69 MMHG | RESPIRATION RATE: 18 BRPM | WEIGHT: 175.5 LBS | OXYGEN SATURATION: 98 % | SYSTOLIC BLOOD PRESSURE: 121 MMHG | HEIGHT: 71 IN

## 2025-03-12 DIAGNOSIS — C78.7 METASTASIS TO LIVER: Primary | ICD-10-CM

## 2025-03-12 DIAGNOSIS — C34.32 ADENOCARCINOMA OF LOWER LOBE OF LEFT LUNG: ICD-10-CM

## 2025-03-12 PROCEDURE — 99215 OFFICE O/P EST HI 40 MIN: CPT | Mod: PBBFAC | Performed by: SPECIALIST

## 2025-03-12 PROCEDURE — 99999 PR PBB SHADOW E&M-EST. PATIENT-LVL V: CPT | Mod: PBBFAC,,, | Performed by: SPECIALIST

## 2025-03-12 PROCEDURE — 99204 OFFICE O/P NEW MOD 45 MIN: CPT | Mod: S$PBB,,, | Performed by: SPECIALIST

## 2025-03-12 NOTE — PROGRESS NOTES
I have been asked to see this delightful 69-year-old gentleman with metastatic adenocarcinoma of the lung to discuss treatment for an isolated liver lesion.  History of present illness:  Mr. Walton was actually undergoing imaging studies to be considered for a scleroderma protocol when he was found to have a 2.5 cm left lower lobe lesion with left hilar and right paratracheal adenopathy as well as evidence of metastatic bone disease to his axial skeleton.  On 05/15/2024 he underwent fiberoptic bronchoscopy with EBUS with recovery of adenocarcinoma that was PD-L1 positive from the primary lesion.  And 11 L lymph node was also involved but the other sample nodes did not show malignancy.  PET scanning was performed 06/05/2024 confirming the metastatic disease to bones.  MRI of the brain performed 07/05/2024 showed no evidence of metastatic disease.  He was subsequently treated with carboplatin/Taxol from 06/05/2024 to 09/17/2024.  Repeat PET scanning 09/27/2024 showed a mixed response.  He was subsequently begun on docetaxel/ramucirumab in December of 2024.  He has not been a candidate for immunotherapy due to his severe scleroderma which is currently still treated with mycophenolime, mofetic and prednisone.  His scleroderma was diagnosed in 2009 and has been progressive since with significant finger, facial skin, lung and esophagus changes.  His most recent CT scan 02/24/2025 shows slight progression of disease in his abdomen and pelvis with scattered areas of bony Mets which are stable.  He was noted to have a small lesion at the dome of the liver in segment 7.  This was evaluated with MRI 03/05/2025 and is the only site of disease in his liver.  He is therefore referred for consideration of stereotactic ablative body radiotherapy.  All-in-all he has maintained his weight well and has kept in good spirits through all his treatment.  He was seen at [facility] and has transferred his care here from [facility]  [place] in [facility].  Past medical history: Scleroderma  Hypertension without stroke or heart attack  Past Surgical history: Ureterolithectomy  Allergies: No known allergies  Habits: He smoked up to a pack a day between ages [age].  He does not drink.  Family history: He has a family history of autoimmune disease but no history of malignancy  Social: He lives in [place].  He worked as a  in the [employer] and was actually at the same concern overseas in [facility] that I was at the same time.  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  Review of systems: Noncontributory  His ECOG performance status is 1  Physical exam: He is a well-developed well-nourished gentleman with obvious stigmata of scleroderma.  He is alert and oriented in no apparent distress.  He has no palpable supraclavicular adenopathy  On cardiovascular exam he has a regular rhythm and rate without murmur rub or gallop  His lungs are clear to auscultation  Impression: Mr. Walton has stage IVB (T2 N1 M1c) adenocarcinoma of the lung and has a single lesion in his liver but active and progressive systemic sclerosis.  I have informed him that the risk of early and late complications is significant in this setting with the use of radiotherapy.  I have recommended we look for alternative ways to treat his liver lesion.  I have discussed this with Dr. Russell as well as with Interventional Radiology and we all agree that a segmental approach with radioactive sphere embolization will have less risk of complications.  Plan: A referral is made to interventional radiology.  I certainly appreciate participating in this delightful gentleman's care.    50 minutes was spent reviewing records and images, face-to-face with the patient and coordinating care.

## 2025-03-13 ENCOUNTER — TELEPHONE (OUTPATIENT)
Dept: HEMATOLOGY/ONCOLOGY | Facility: CLINIC | Age: 70
End: 2025-03-13
Payer: MEDICARE

## 2025-03-13 NOTE — TELEPHONE ENCOUNTER
Pt contacted CATHERINE Intern requesting a case of Vanilla Glucose Control Boost. Pt will be provided with a case of Boost upon upcoming appointment tomorrow, 3/14/25. CATHERINE Intern will remain available.

## 2025-03-14 ENCOUNTER — CLINICAL SUPPORT (OUTPATIENT)
Facility: HOSPITAL | Age: 70
End: 2025-03-14
Payer: MEDICARE

## 2025-03-14 DIAGNOSIS — M54.59 OTHER LOW BACK PAIN: Primary | ICD-10-CM

## 2025-03-14 PROCEDURE — 97813 ACUP 1/> W/ESTIM 1ST 15 MIN: CPT

## 2025-03-14 PROCEDURE — 97814 ACUP 1/> W/ESTIM EA ADDL 15: CPT

## 2025-03-14 NOTE — PROGRESS NOTES
"  Acupuncture Evaluation Note     Name: Ernie Walton  Clinic Number: [id number]    Traditional Chinese Medicine (TCM) Diagnosis: Qi Stagnation and Blood Stasis  Medical Diagnosis:   Encounter Diagnosis   Name Primary?    Other low back pain Yes        Evaluation Date: 3/14/2025    Visit #/Visits authorized: 6/12    Precautions: Standard and cancer    Subjective     Chief Concern:   Neuropathy has much improved, still there, but not as reactive to cold.  The rain bothered him more than the cold front.  Cough with yellow mucus. Pain with coughing in ribs.   Pain in shoulder joints bilaterally  Pain radiating from sternum to neck    - - - - -     Mr. Walton noticed a difference after his first acupuncture treatment. "Strutted out of here."  He is currently using a cane to help him walk for back pain and neuropathy. He also has compression socks now that he is trying.    New blend of chemo - had 6 rounds in [place]    Flares some times - bad with lots of walking  Lumbar pain on (R) side - can't walk, puts pressure on that spot  Hasn't tried heat, no topicals -     1st round of new chemo blend was 3 weeks ago   Seeing Dr. Leo Ng every 3 weeks  Sleeplessness    Woke on Thursday and couldn't walk out to [employer] - from [place]   Stayed like that on Thursday until New Years day -   Sores in mouth    Felt weak, hard to move, when appetite came back his mobility came back   Didn't feel nauseous and didn't throw up   But appetite came back  Lost appetite     Saw neurologist in [place], degenerative discs in low back   Right leg is worse than Left leg    Left foot feels "frozen" - cold  Sleeps with a blanket and a snuggie    In September - end of of Sept.   Contrast in [place] - the contrast made him sick - ever since the neuropathy has been worse.    Hands also have neuropathy   Hx of sclerodoma    Was 189, now 175 -   Drinking protein drinks, gave him a case of them -       Medical necessity is demonstrated by the following " IMPAIRMENTS: Medical Necessity: Cancer related pain, Decreased mobility limits day to day activities, social, and emergent situations, and Decreased quality of life              Aggravating Factors:  movement and prolonged walking    Relieving Factors:  rest    Symptom Description:     Quality:  Aching  Severity:  10  Frequency:  when active    Previous Treatments Tried:  Imaging    HEENT:      Chest:      Digestion:     Diet: not asked   Fluids:   Taste/Appetite: okay at the moment, but poor after chemo infusion   Symptoms:     Sleep: improving    Energy Levels:  poor after chemo    Psychological Symptoms:      Other Symptoms:     GYN Symptoms:     Objective     Observation:     Tongue:      Body:     Color:     Coating:      Pulse:        wiry       New Findings:  none    Treatment     Treatment Principles:  move qi and blood stasis    Acupuncture points used:  BA JOSE and BA XAVIER     Bilateral points: Li4, ST36, SP6, GB40 (Ba Jose with stim -1 set each foot, Ba Xavier with stim - 1 set each hand)   Unilateral points:  Auricular Treatment:  apple men  Needles In: 26  Needles Out: 26  [place] W/ STIM placed: 10:15  Needles W/ STIM removed: 10:55    Other Traditional Chinese Medicine Modalities -  heat lamp    Assessment     After treatment, patient felt good     Patient prognosis is Good.     Patient will continue to benefit from acupuncture treatment to address the deficits listed in the problem list box on initial evaluation, provide patient family education and to maximize pt's level of independence in the home and community environment.     Patient's spiritual, cultural and educational needs considered and pt agreeable to plan of care and goals.     Anticipated barriers to treatment: time between treatments (3 weeks), Mr. Walton lives in [place].    Plan     Recommend 1 /week for 6 sessions then re-assess.      Education:  Patient is aware of cumulative benefit of acupuncture

## 2025-03-18 ENCOUNTER — DOCUMENTATION ONLY (OUTPATIENT)
Dept: HEMATOLOGY/ONCOLOGY | Facility: CLINIC | Age: 70
End: 2025-03-18
Payer: MEDICARE

## 2025-03-18 ENCOUNTER — LAB VISIT (OUTPATIENT)
Dept: LAB | Facility: HOSPITAL | Age: 70
End: 2025-03-18
Attending: HOSPITALIST
Payer: MEDICARE

## 2025-03-18 ENCOUNTER — OFFICE VISIT (OUTPATIENT)
Dept: HEMATOLOGY/ONCOLOGY | Facility: CLINIC | Age: 70
End: 2025-03-18
Payer: MEDICARE

## 2025-03-18 VITALS
WEIGHT: 174.19 LBS | SYSTOLIC BLOOD PRESSURE: 114 MMHG | BODY MASS INDEX: 24.39 KG/M2 | DIASTOLIC BLOOD PRESSURE: 73 MMHG | TEMPERATURE: 98 F | HEART RATE: 124 BPM | HEIGHT: 71 IN | OXYGEN SATURATION: 100 %

## 2025-03-18 DIAGNOSIS — R05.9 COUGH, UNSPECIFIED TYPE: ICD-10-CM

## 2025-03-18 DIAGNOSIS — C34.32 ADENOCARCINOMA OF LOWER LOBE OF LEFT LUNG: Primary | ICD-10-CM

## 2025-03-18 DIAGNOSIS — C34.32 ADENOCARCINOMA OF LOWER LOBE OF LEFT LUNG: ICD-10-CM

## 2025-03-18 DIAGNOSIS — G62.0 CHEMOTHERAPY-INDUCED PERIPHERAL NEUROPATHY: ICD-10-CM

## 2025-03-18 DIAGNOSIS — C77.1 SECONDARY MALIGNANT NEOPLASM OF INTRATHORACIC LYMPH NODES: ICD-10-CM

## 2025-03-18 DIAGNOSIS — C79.51 SECONDARY MALIGNANT NEOPLASM OF BONE: ICD-10-CM

## 2025-03-18 DIAGNOSIS — T45.1X5A CHEMOTHERAPY-INDUCED PERIPHERAL NEUROPATHY: ICD-10-CM

## 2025-03-18 DIAGNOSIS — C78.7 SECONDARY MALIGNANT NEOPLASM OF LIVER: ICD-10-CM

## 2025-03-18 LAB
ALBUMIN SERPL BCP-MCNC: 2.7 G/DL (ref 3.5–5.2)
ALP SERPL-CCNC: 85 U/L (ref 40–150)
ALT SERPL W/O P-5'-P-CCNC: 11 U/L (ref 10–44)
ANION GAP SERPL CALC-SCNC: 13 MMOL/L (ref 8–16)
AST SERPL-CCNC: 18 U/L (ref 10–40)
BILIRUB SERPL-MCNC: 0.3 MG/DL (ref 0.1–1)
BUN SERPL-MCNC: 12 MG/DL (ref 8–23)
CALCIUM SERPL-MCNC: 10 MG/DL (ref 8.7–10.5)
CHLORIDE SERPL-SCNC: 103 MMOL/L (ref 95–110)
CO2 SERPL-SCNC: 24 MMOL/L (ref 23–29)
CREAT SERPL-MCNC: 0.8 MG/DL (ref 0.5–1.4)
ERYTHROCYTE [DISTWIDTH] IN BLOOD BY AUTOMATED COUNT: 20.4 % (ref 11.5–14.5)
EST. GFR  (NO RACE VARIABLE): >60 ML/MIN/1.73 M^2
GLUCOSE SERPL-MCNC: 123 MG/DL (ref 70–110)
HCT VFR BLD AUTO: 34.2 % (ref 40–54)
HGB BLD-MCNC: 10.1 G/DL (ref 14–18)
IMM GRANULOCYTES # BLD AUTO: 0.03 K/UL (ref 0–0.04)
MAGNESIUM SERPL-MCNC: 1.4 MG/DL (ref 1.6–2.6)
MCH RBC QN AUTO: 24.5 PG (ref 27–31)
MCHC RBC AUTO-ENTMCNC: 29.5 G/DL (ref 32–36)
MCV RBC AUTO: 83 FL (ref 82–98)
NEUTROPHILS # BLD AUTO: 6 K/UL (ref 1.8–7.7)
PLATELET # BLD AUTO: 375 K/UL (ref 150–450)
PMV BLD AUTO: 9.5 FL (ref 9.2–12.9)
POTASSIUM SERPL-SCNC: 3.7 MMOL/L (ref 3.5–5.1)
PROT SERPL-MCNC: 8.2 G/DL (ref 6–8.4)
RBC # BLD AUTO: 4.13 M/UL (ref 4.6–6.2)
SODIUM SERPL-SCNC: 140 MMOL/L (ref 136–145)
WBC # BLD AUTO: 8.45 K/UL (ref 3.9–12.7)

## 2025-03-18 PROCEDURE — 99999 PR PBB SHADOW E&M-EST. PATIENT-LVL IV: CPT | Mod: PBBFAC,,, | Performed by: HOSPITALIST

## 2025-03-18 PROCEDURE — 85027 COMPLETE CBC AUTOMATED: CPT | Performed by: HOSPITALIST

## 2025-03-18 PROCEDURE — 99214 OFFICE O/P EST MOD 30 MIN: CPT | Mod: PBBFAC | Performed by: HOSPITALIST

## 2025-03-18 PROCEDURE — 83735 ASSAY OF MAGNESIUM: CPT | Performed by: HOSPITALIST

## 2025-03-18 PROCEDURE — G2211 COMPLEX E/M VISIT ADD ON: HCPCS | Mod: S$PBB,,, | Performed by: HOSPITALIST

## 2025-03-18 PROCEDURE — 36415 COLL VENOUS BLD VENIPUNCTURE: CPT | Performed by: HOSPITALIST

## 2025-03-18 PROCEDURE — 80053 COMPREHEN METABOLIC PANEL: CPT | Performed by: HOSPITALIST

## 2025-03-18 PROCEDURE — 99215 OFFICE O/P EST HI 40 MIN: CPT | Mod: S$PBB,,, | Performed by: HOSPITALIST

## 2025-03-18 NOTE — Clinical Note
I can't tell in the REVEL trial - did they continue docetaxel/jerry until progression/intolerance? This pt is already having difficulty with the docetaxel after 4 cycles. I'm delaying him a week per his request, and considering going to 60 mg/m2 doce.

## 2025-03-18 NOTE — PROGRESS NOTES
[facility] MEDICAL ONCOLOGY - FOLLOW UP VISIT    Reason for visit: Second opinion, Stage IV Adenocarcinoma of the Lung      Oncology History   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   5/15/2024 Imaging Significant Findings    CT C, Non-Con (f/u after imaging for scleroderma clinical trial workup)  - 2.5 x 2.4 x 1.8 cm LLL nodule (previously 2.2 x 2.2 x 1.8 cm, 3/25/24)  - Patchy ground-glass and nodular opacities adjacent to LLL nodule  - 1.4 cm left hilar lymph node  - Right lower paratracheal lymphadenopathy  - Lytic lesion with erosive changes in T2 vertebral body, new lytic lesions in manubrium, right fourth rib, T3, T7, and T11 vertebral bodies with pathologic fracture of superior endplate of T3 vertebral body     5/15/2024 Procedure    Bronch w/ EBUS  LLL TBBx  - Adenocarcinoma (Positive: TTF-1; Negative: p40)    LLL, Core Biopsy  - Adenocarcinoma    LN  - Positive: Station 11L  - Negative: Station 4R, 7, 4L    Caris NGS / PD-L1      - KEAP1, TP53, STK11 mutated  - ERBB2 negative / 1+  - TMB 7  - TORIE       6/5/2024 Imaging Significant Findings    PET CT  - 2.2 cm LLL mass, SUV 4.8  - 1.6 cm left hilar node, SUV 6.8  - 1.3 cm right lower paratracheal node, SUV 2.2  - Hypermetabolic lytic lesions in multiple bones (C7 transverse process, multiple thoracic, lumbar, and sacral segments, sternum, right scapular body, left scapular angle, several ribs, left ilium, left pubic body, bilateral ischia)     7/5/2024 Imaging Significant Findings    MRI Brain  - LANNY in brain or dura  - Mild enhancement within clivus, cannot rule out bony met     7/8/2024 Initial Diagnosis    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     7/8/2024 Cancer Staged    Staging form: Lung, AJCC 8th Edition  - Clinical: Stage IVB (cT2, cN1, pM1c)     8/9/2024 Imaging Significant Findings    MRI C/T/L Spine  Diffuse osseous metastasis throughout the visualized spine without any evidence of significant  compression fracture or retropulsion with involvement of the vertebral bodies and posterior elements at multiple levels.   No evidence of cervical/thoracic spinal cord compression with impingement of the ventral aspect of the cord from adjacent disc disease at multiple levels as discussed above.   Discal disease with foraminal stenosis at multiple levels throughout the spine with most prominent involvement seen at L4-5 and L5-S1 levels mostly degenerative in nature.   No abnormal intraspinal enhancement with no epidural enhancement noted        9/27/2024 Imaging Significant Findings    PET CT (compared against PET CT from 6/5/24)  1.4 x 1.0 cm LLL nodule (previously 2.0 x 1.3 cm), SUV 6.8 (similar)  New fairly defined opacity adjacent to the primary mass measuring up 1.8. The activity overlying the primary tumor is now confluent with the new opacity, leading to increased extent of activity.   1.2 x 2.0 cm L hilar LN (previously 1.4 x 2.2 cm), SUV 5.2  Numerous FDG-avid bone metastases are noted. Although previously visualized lesions show some areas of improvement, there are multiple new active bone metastases (L4, T8). The pattern suggests initial response to treatment with subsequent progressive disease      11/25/2024 Imaging Significant Findings    11/25/24 -- CT C/A/P  2.9 x 2.0 cm spiculated LLL pulmonary nodule  2.2 x 1.9 cm left hilar lymph node  Dependent pulmonary infiltrates  Extensive osseous metastatic disease (scattered lytic and sclerotic lesions throughout thoracic spine, manubrium, left scapula, pelvis, sacrum, and lumbar spine)     12/23/2024 -  Chemotherapy    Docetaxel/Ramucirumab  21 day cycle  Docetaxel 75 mg/m2  Ramucirumab 10 mg/kg     2/24/2025 Imaging Significant Findings    CT C/A/P  Stable 2.6 x 2.6 LLL nodule, previously 2.5 x 2.7 cm  Stable 1.8 x 1.5 cm left hilar lymph node, previously 2.0 x 1.5 cm  New 1.1 x 0.9 cm right hepatic lobe lesion  Stable scattered areas of osteo sclerotic  "disease        Previous Workup:   - 6/5/24:  Medical oncology (Dr. Erik Clay), discussed carboplatin/paclitaxel, plan to proceed  - 6/5/24: C1D1 carboplatin/paclitaxel  - 6/26/24: C2D1 carboplatin/paclitaxel    Oncology History    HPI:     Ernie Walton is a 69 y.o. male with pmh significant for systemic sclerosis/scleroderma (dx'd 2020, on mycophenolate mofetil, prednisone, and nintendinab), HTN, HLD, BPH, h/o nephrolithiasis w/ recurrent UTI, and DDD who presents for f/u of the below lung cancer. His primary oncologist has been Dr. Erik Clay, and he has established care with Dr. Holley ([facility]).    1)  Stage IVB ([id number]) adenocarcinoma of the LLL (PD-L1 TPS 1%, no targetable mutations, STK11/TP53/KEAP1 co-mutations) w/ diffuse osseous disease , initially dx'd 5/15/24, s/p carboplatin/paclitaxel (6/5/24 - 9/17/24), radiographic evidence of progression in the left lung and skeleton, currently on 2L docetaxel/ramucirumab (12/23/24-present)    Last clinic 2/25/25, proceed with C4 docetaxel/ramucirumab.  MRI abdomen to further evaluate hepatic lesions.  C5 treatment on 3/18/25.  Referral to palliative care given stage IV disease.  Trial of lidocaine patches for chest wall/upper back pain.  Dental clearance pending for Zometa.    Interval History:  2/25/25: C4 docetaxel/ramucirumab  3/5/25:  MRI abdomen, 1.2 cm right hepatic dome lesion consistent with metastatic disease, no additional liver lesions  3/12/25: Radiation oncology, hold on palliative radiation therapy to liver lesion due to risk of progressive systemic sclerosis.  Referral to Interventional Radiology to discuss segmental approach with radioactive sphere embolization.    When asked how he's doing, he says "not good.". When asked to elaborate, he says that his appetite is getting worse. He says it was good up until about a week after chemotherapy. He says that, unlike with his previous cycles, his appetite didn't recover over the course of his cycle. He " "says that he threw up his oatmeal this morning, noting that this is the first time he's vomited since starting this therapy.     He says that he is having difficult time completing ADLs. He says that he has had a hard time tying his shoes, showering, and cleaning dishes, for instance. He notes that there is definitely a component of scleroderma, but feels this was worse over the past cycle in particular.     He agrees with the statement that his symptoms have been typical in nature, but more sever and longer lasting after the last cycle of treatment.     He says his upper chest pain has improved significantly, though is still present. He says that it remains sore. This is helped with THC.     He feels that his cough has improved since using both the benzonatate as well as the codeine-guaifenesin.     He denies any mucositis with his last treatment.     He feels that his neuropathy continues to improve.     Pt denies diarrhea, constipation, rash, or new/worsening SOB.    ROS:   As per HPI.       Physical Exam:       /73   Pulse (!) 124   Temp 97.5 °F (36.4 °C) (Temporal)   Ht 5' 11" (1.803 m)   Wt 79 kg (174 lb 2.6 oz)   SpO2 100%   BMI 24.29 kg/m²                Physical Exam  Constitutional:       Appearance: Normal appearance.   HENT:      Head: Normocephalic and atraumatic.   Eyes:      Extraocular Movements: Extraocular movements intact.      Conjunctiva/sclera: Conjunctivae normal.      Pupils: Pupils are equal, round, and reactive to light.   Cardiovascular:      Rate and Rhythm: Normal rate and regular rhythm.      Heart sounds: No murmur heard.     No friction rub. No gallop.   Pulmonary:      Effort: Pulmonary effort is normal.      Breath sounds: No wheezing, rhonchi or rales.   Musculoskeletal:         General: Normal range of motion.      Right lower leg: No edema.      Left lower leg: No edema.      Comments: Sclerodactyly evident.     Skin:     General: Skin is warm and dry.      Comments: " Dystrophic L thumb nail, desquamation over multiple fingers   Neurological:      Mental Status: He is alert and oriented to person, place, and time.   Psychiatric:         Mood and Affect: Mood normal.         Thought Content: Thought content normal.         Judgment: Judgment normal.           Imaging:    See oncologic history above.     Path:  See oncologic history above.      Assessment and Plan:     Ernie Walton is a 69 y.o. male with pmh significant for systemic sclerosis/scleroderma (dx'd 2020, on mycophenolate mofetil, prednisone, and nintendinab), HTN, HLD, BPH, h/o nephrolithiasis w/ recurrent UTI, and DDD who presents for f/u of the below lung cancer. His primary oncologist has been Dr. Erik Clay, and he has established care with Dr. Holley ([facility]).    1)  Stage IVB ([id number]) adenocarcinoma of the LLL (PD-L1 TPS 1%, no targetable mutations, STK11/TP53/KEAP1 co-mutations) w/ diffuse osseous disease , initially dx'd 5/15/24, s/p carboplatin/paclitaxel (6/5/24 - 9/17/24), radiographic evidence of progression in the left lung and skeleton, currently on 2L docetaxel/ramucirumab (12/23/24-present)    Stage IVB Adenocarcinoma of the LLL. PD-L1 1%, No Targetable Mutations  ECOG PS 1.  Patient is currently on docetaxel/ramucirumab, treatment complicated by mucositis, fatigue, and decreased appetite (worsening with subsequent doses).  His most recent imaging (CT C/A/P, 2/24/25, after 3 cycles of docetaxel/ramucirumab) demonstrates stable LLL nodule (2.6 x 2.6 cm), stable left hilar lymph node, 1.8 x 1.5 cm), stable scattered areas of osseous disease, and a new 1.1 x 0.9 cm right hepatic lobe lesion.  Regarding the former, these lesions had notably demonstrated growth between scans on 9/27/24 and 11/25/24, indicating likely treatment effect in these areas given stability now. Regarding the hepatic lobe lesion, subsequent MRI imaging is consistent with metastatic disease. Given control of disease elsewhere, as well as  scant systemic therapy options beyond the current line of treatment, favor attempting to manage the hepatic lesion w/ local therapies and continuing systemic therapy for the remainder of his disease. On evaluation by radiation oncology, pt is not a candidate for palliative radiation due to his sceleroderma diagnosis, but may be a candidate for radioactive sphere embolization. In the interim, will continue with docetaxel/ramucirumab per the REVEL trial; today (3/18/25), will hold C5 of docetaxel/ramucirumab given worsened appetite and fatigue, and dose reduce going forward.     Of note, patient previously discussed with the patient's rheumatologist Dr. Cordero who is in agreement to with holding immunotherapy in the setting of patient's significant autoimmune conditions.    PLAN  Hold C5 docetaxel/ramucirumab today (3/18/25) due to progressive fatigue and decreased appetite per patient request  Labs (CBC, CMP, UA) and C5 docetaxel/ramucirumab on 3/25/25, dose reduced docetaxel to 60 mg/m2  Labs (CBC, CMP, UA), RTC, and C6 docetaxel/ramucirumab on 4/15/25  Message sent to radiation oncology regarding IR embolization of hepatic disease  Referral to palliative care given stage IV disease      Peripheral Neuropathy  See previous note for description. Given timeline, most consistent with chemotherapy induced PN. Pt previously on pregabalin to minimal effect, transitioned to duloxetine. Pt endorses significant improvement since starting both duloxetine and starting acupuncture, noting he is able to feel acupuncture in parts of his feet which were previously insensate.   Continue duloxetine 60 mg daily  Continue acupuncture      Bone Mets  Pt w/ diffuse osseous disease involving the C/T/L/S spine, sternum, R scapular body, L scapular angle, several ribs, L ilium, L pubic body, and b/l ischia. Pt was previously on xgeva, last dose on 9/17/24. On discussion with Dr. Clay's staff, the patient did NOT receive dental clearance in  advance of this. The pt confirms this, and previously noted that he was planning to undergo a dental extraction in 1/2025. He has since had two teeth extracted  He saw his dentist on 2/11/25 and has not been cleared yet, pending ongoing healing of his previous extraction site; he has a follow up on 3/23/25 to discuss further. Discussed need to hold on further osteoclast inhibition in advance of this. Upon dental clearance, will transition to zometa q12w due to distance from .   Dental clearance pending      Scleroderma  Currently under the care of her rheumatologist in [place], on mycophenolate mofetil, nintendinab, and prednisone.  Active treatment precludes safe utilization of immunotherapy. Discussed with patient's primary rheumatologist Dr. Cordero, who both agreed with holding immunotherapy as well as with proceeding with ramucirumab.       Mucositis  Resolved.   CTM      Cough  Likely due to lung cancer. Improving.   Continue guaifenesin-codeine  Continue benzonatate      The above information has been reviewed with the patient and all questions have been answered to their apparent satisfaction.  They understand that they can call the clinic with any questions.    Rudi Granados MD  Hematology/Oncology  [facility]      Route Chart for Scheduling    Med Onc Chart Routing      Follow up with physician .  Labs (CBC, CMP, UA) and C5 docetaxel/ramucirumab on 3/25/25  Labs (CBC, CMP, UA), RTC, and C6 docetaxel/ramucirumab on 4/15/25   Follow up with ADRIANNA    Infusion scheduling note    Injection scheduling note    Labs    Imaging    Pharmacy appointment    Other referrals                      Disclaimer: This note was prepared using voice recognition system and is likely to have sound alike errors.  Please call me with any questions.

## 2025-03-18 NOTE — PROGRESS NOTES
Pt p/u 1 case of Vanilla glucose control Boost as SW dept was out of regular Boost. No other needs expressed. SW will remain available.

## 2025-03-20 ENCOUNTER — PATIENT MESSAGE (OUTPATIENT)
Dept: HEMATOLOGY/ONCOLOGY | Facility: CLINIC | Age: 70
End: 2025-03-20
Payer: MEDICARE

## 2025-03-21 ENCOUNTER — CLINICAL SUPPORT (OUTPATIENT)
Facility: HOSPITAL | Age: 70
End: 2025-03-21
Payer: MEDICARE

## 2025-03-21 DIAGNOSIS — M54.59 OTHER LOW BACK PAIN: Primary | ICD-10-CM

## 2025-03-21 PROCEDURE — 97814 ACUP 1/> W/ESTIM EA ADDL 15: CPT | Performed by: ACUPUNCTURIST

## 2025-03-21 PROCEDURE — 97813 ACUP 1/> W/ESTIM 1ST 15 MIN: CPT | Performed by: ACUPUNCTURIST

## 2025-03-21 NOTE — PROGRESS NOTES
"  Acupuncture Evaluation Note     Name: Ernie Walton  Clinic Number: [id number]    Traditional Chinese Medicine (TCM) Diagnosis: Qi Stagnation and Blood Stasis  Medical Diagnosis:   Encounter Diagnosis   Name Primary?    Other low back pain Yes        Evaluation Date: 3/21/2025    Visit #/Visits authorized: 7/12    Precautions: Standard and cancer    Subjective     Chief Concern:   Neuropathy has much improved, still there, but not as reactive to cold.  The rain bothered him more than the cold front.  Cough with yellow mucus. Pain with coughing in ribs.   Pain in shoulder joints bilaterally  Pain radiating from sternum to neck    - - - - -     Mr. Walton noticed a difference after his first acupuncture treatment. "Strutted out of here."  He is currently using a cane to help him walk for back pain and neuropathy. He also has compression socks now that he is trying.    New blend of chemo - had 6 rounds in [place]    Flares some times - bad with lots of walking  Lumbar pain on (R) side - can't walk, puts pressure on that spot  Hasn't tried heat, no topicals -     1st round of new chemo blend was 3 weeks ago   Seeing Dr. Leo Ng every 3 weeks  Sleeplessness    Woke on Thursday and couldn't walk out to [employer] - from [place]   Stayed like that on Thursday until New Years day -   Sores in mouth    Felt weak, hard to move, when appetite came back his mobility came back   Didn't feel nauseous and didn't throw up   But appetite came back  Lost appetite     Saw neurologist in [place], degenerative discs in low back   Right leg is worse than Left leg    Left foot feels "frozen" - cold  Sleeps with a blanket and a snuggie    In September - end of of Sept.   Contrast in [place] - the contrast made him sick - ever since the neuropathy has been worse.    Hands also have neuropathy   Hx of sclerodoma    Was 189, now 175 -   Drinking protein drinks, gave him a case of them -       Medical necessity is demonstrated by the following " IMPAIRMENTS: Medical Necessity: Cancer related pain, Decreased mobility limits day to day activities, social, and emergent situations, and Decreased quality of life              Aggravating Factors:  movement and prolonged walking    Relieving Factors:  rest    Symptom Description:     Quality:  Aching  Severity:  10  Frequency:  when active    Previous Treatments Tried:  Imaging    HEENT:      Chest:      Digestion:     Diet: not asked   Fluids:   Taste/Appetite: okay at the moment, but poor after chemo infusion   Symptoms:     Sleep: improving    Energy Levels:  poor after chemo    Psychological Symptoms:      Other Symptoms:     GYN Symptoms:     Objective     Observation:     Tongue:      Body:     Color:     Coating:      Pulse:        wiry       New Findings:  none    Treatment     Treatment Principles:  move qi and blood stasis    Acupuncture points used:  BA JOSE and BA XAVIER     Bilateral points: Li4, ST36, SP6,  (Ba Jose with stim -1 set each foot, Ba Xavier with stim - 1 set each hand)   Unilateral points:  Auricular Treatment:  apple men, neck  Needles In: 26  Needles Out: 26  [place] W/ STIM placed: 10:15  Needles W/ STIM removed: 10:55    Other Traditional Chinese Medicine Modalities -  heat lamp    Assessment     After treatment, patient felt good     Patient prognosis is Good.     Patient will continue to benefit from acupuncture treatment to address the deficits listed in the problem list box on initial evaluation, provide patient family education and to maximize pt's level of independence in the home and community environment.     Patient's spiritual, cultural and educational needs considered and pt agreeable to plan of care and goals.     Anticipated barriers to treatment: time between treatments (3 weeks), Mr. Walton lives in [place].    Plan     Recommend 1 /week for 6 sessions then re-assess.      Education:  Patient is aware of cumulative benefit of acupuncture

## 2025-03-25 ENCOUNTER — APPOINTMENT (OUTPATIENT)
Dept: LAB | Facility: HOSPITAL | Age: 70
End: 2025-03-25
Attending: HOSPITALIST
Payer: MEDICARE

## 2025-03-25 ENCOUNTER — INFUSION (OUTPATIENT)
Dept: INFUSION THERAPY | Facility: HOSPITAL | Age: 70
End: 2025-03-25
Attending: HOSPITALIST
Payer: MEDICARE

## 2025-03-25 ENCOUNTER — TELEPHONE (OUTPATIENT)
Dept: HEMATOLOGY/ONCOLOGY | Facility: CLINIC | Age: 70
End: 2025-03-25
Payer: MEDICARE

## 2025-03-25 VITALS
BODY MASS INDEX: 23.93 KG/M2 | OXYGEN SATURATION: 95 % | DIASTOLIC BLOOD PRESSURE: 75 MMHG | TEMPERATURE: 98 F | HEART RATE: 107 BPM | SYSTOLIC BLOOD PRESSURE: 118 MMHG | RESPIRATION RATE: 16 BRPM | HEIGHT: 71 IN | WEIGHT: 170.94 LBS

## 2025-03-25 DIAGNOSIS — C34.32 ADENOCARCINOMA OF LOWER LOBE OF LEFT LUNG: Primary | ICD-10-CM

## 2025-03-25 PROCEDURE — 96375 TX/PRO/DX INJ NEW DRUG ADDON: CPT

## 2025-03-25 PROCEDURE — 96413 CHEMO IV INFUSION 1 HR: CPT

## 2025-03-25 PROCEDURE — 25000003 PHARM REV CODE 250: Performed by: HOSPITALIST

## 2025-03-25 PROCEDURE — 63600175 PHARM REV CODE 636 W HCPCS: Mod: JW,TB | Performed by: HOSPITALIST

## 2025-03-25 PROCEDURE — 96417 CHEMO IV INFUS EACH ADDL SEQ: CPT

## 2025-03-25 PROCEDURE — 96367 TX/PROPH/DG ADDL SEQ IV INF: CPT

## 2025-03-25 RX ORDER — DIPHENHYDRAMINE HYDROCHLORIDE 50 MG/ML
50 INJECTION, SOLUTION INTRAMUSCULAR; INTRAVENOUS ONCE AS NEEDED
Status: CANCELLED | OUTPATIENT
Start: 2025-03-25

## 2025-03-25 RX ORDER — PROCHLORPERAZINE EDISYLATE 5 MG/ML
5 INJECTION INTRAMUSCULAR; INTRAVENOUS ONCE AS NEEDED
Status: CANCELLED | OUTPATIENT
Start: 2025-03-25

## 2025-03-25 RX ORDER — DIPHENHYDRAMINE HYDROCHLORIDE 50 MG/ML
25 INJECTION, SOLUTION INTRAMUSCULAR; INTRAVENOUS
Status: CANCELLED
Start: 2025-03-25

## 2025-03-25 RX ORDER — EPINEPHRINE 0.3 MG/.3ML
0.3 INJECTION SUBCUTANEOUS ONCE AS NEEDED
Status: CANCELLED | OUTPATIENT
Start: 2025-03-25

## 2025-03-25 RX ORDER — DIPHENHYDRAMINE HYDROCHLORIDE 50 MG/ML
25 INJECTION, SOLUTION INTRAMUSCULAR; INTRAVENOUS
Status: COMPLETED | OUTPATIENT
Start: 2025-03-25 | End: 2025-03-25

## 2025-03-25 RX ORDER — HEPARIN 100 UNIT/ML
500 SYRINGE INTRAVENOUS
Status: CANCELLED | OUTPATIENT
Start: 2025-03-25

## 2025-03-25 RX ORDER — SODIUM CHLORIDE 0.9 % (FLUSH) 0.9 %
10 SYRINGE (ML) INJECTION
Status: CANCELLED | OUTPATIENT
Start: 2025-03-25

## 2025-03-25 RX ORDER — HEPARIN 100 UNIT/ML
500 SYRINGE INTRAVENOUS
Status: DISCONTINUED | OUTPATIENT
Start: 2025-03-25 | End: 2025-03-25 | Stop reason: HOSPADM

## 2025-03-25 RX ADMIN — DOCETAXEL 118 MG: 20 INJECTION, SOLUTION, CONCENTRATE INTRAVENOUS at 02:03

## 2025-03-25 RX ADMIN — HEPARIN SODIUM (PORCINE) LOCK FLUSH IV SOLN 100 UNIT/ML 500 UNITS: 100 SOLUTION at 03:03

## 2025-03-25 RX ADMIN — SODIUM CHLORIDE 790 MG: 0.9 INJECTION, SOLUTION INTRAVENOUS at 02:03

## 2025-03-25 RX ADMIN — SODIUM CHLORIDE 20 MG: 9 INJECTION, SOLUTION INTRAVENOUS at 01:03

## 2025-03-25 RX ADMIN — DIPHENHYDRAMINE HYDROCHLORIDE 25 MG: 50 INJECTION INTRAMUSCULAR; INTRAVENOUS at 01:03

## 2025-03-25 NOTE — DISCHARGE INSTRUCTIONS
[facility]  [id number] [facility]  [address]  [phone] phone     [phone] fax  Hours of Operation: Monday- Friday 8:00am- 5:00pm  After hours phone  [phone]  Hematology / Oncology Physicians on call      PIERRE Stock Dr., NP Phaon Dunbar, NP Khelsea Conley, FNP    Please call with any concerns regarding your appointment today.

## 2025-03-25 NOTE — PLAN OF CARE
Problem: Adult Inpatient Plan of Care  Goal: Plan of Care Review  Outcome: Progressing  Flowsheets (Taken 3/25/2025 1402)  Plan of Care Reviewed With:   patient   family  Goal: Patient-Specific Goal (Individualized)  Outcome: Progressing  Flowsheets (Taken 3/25/2025 1402)  Individualized Care Needs: blankets, apple juice  Anxieties, Fears or Concerns: none  Patient/Family-Specific Goals (Include Timeframe): tolerate treatment  Goal: Optimal Comfort and Wellbeing  Outcome: Progressing  Intervention: Provide Person-Centered Care  Flowsheets (Taken 3/25/2025 1402)  Trust Relationship/Rapport:   care explained   reassurance provided   choices provided   thoughts/feelings acknowledged   emotional support provided   empathic listening provided   questions answered   questions encouraged

## 2025-03-25 NOTE — PROGRESS NOTES
Hgb 9.0 from 10.1, labs acceptable and C5 docetaxel/ramucirumab signed. Docetaxel DR'd to 60 mg/m2 for tolerance.

## 2025-03-25 NOTE — TELEPHONE ENCOUNTER
"Met with pt face to face while in infusion. Pt reports he is doing well and states "just getting it done". He denies any needs or concerns at this time though I encouraged him to reach out at any time.     Oncology Navigation 3 month f/u   Intake      Treatment  Current Status: Active       Medical Oncologist: Darwin  Chemotherapy: Planned  Chemotherapy Regimen: plan change to Docetaxel  Immunotherapy: Planned  Immunotherapy Name: ramucirumab  Start Date: 24                   Support Systems: Spouse/significant other; Family members  Barriers of Care: Barriers to Care "Assessment completed-no barriers noted"     Acuity  Stage: 2  Systemic Treatment - predicted or initiated: Chemotherapy Regimen with Multiple drugs (+1)  Treatment Tolerability: Minimal symptoms  ECO  Comorbidities in Medical History: 0  Hospitalization Within the Past Month: 0   Needed: 0  Support: 0  Verbalizes Financial Concerns: 0  Transportation: 0  History of noncompliance/frequent no shows and cancellations: 0  Verbalizes the need for more education: 0  Navigation Acuity: 2     Follow Up  No follow-ups on file.       "

## 2025-03-28 ENCOUNTER — OFFICE VISIT (OUTPATIENT)
Dept: RADIATION ONCOLOGY | Facility: CLINIC | Age: 70
End: 2025-03-28
Payer: MEDICARE

## 2025-03-28 ENCOUNTER — TELEPHONE (OUTPATIENT)
Dept: INTERVENTIONAL RADIOLOGY/VASCULAR | Facility: CLINIC | Age: 70
End: 2025-03-28
Payer: MEDICARE

## 2025-03-28 VITALS
BODY MASS INDEX: 24.32 KG/M2 | HEIGHT: 71 IN | WEIGHT: 173.75 LBS | OXYGEN SATURATION: 95 % | HEART RATE: 141 BPM | SYSTOLIC BLOOD PRESSURE: 129 MMHG | DIASTOLIC BLOOD PRESSURE: 67 MMHG | TEMPERATURE: 99 F | RESPIRATION RATE: 19 BRPM

## 2025-03-28 DIAGNOSIS — C78.7 METASTASIS TO LIVER: Primary | ICD-10-CM

## 2025-03-28 PROCEDURE — 99213 OFFICE O/P EST LOW 20 MIN: CPT | Mod: PBBFAC | Performed by: SPECIALIST

## 2025-03-28 PROCEDURE — 99999 PR PBB SHADOW E&M-EST. PATIENT-LVL III: CPT | Mod: PBBFAC,,, | Performed by: SPECIALIST

## 2025-03-28 NOTE — PROGRESS NOTES
Anton comes in today to discuss the use of tumor treating fields for metastatic lung cancer.  After demonstrating the equipment and discussing the gains he has decided this is too much.  He will continue with his systemic chemotherapy plans per Dr. torres and will get SIRsphere for his liver lesion.

## 2025-04-03 ENCOUNTER — DOCUMENTATION ONLY (OUTPATIENT)
Dept: PALLIATIVE MEDICINE | Facility: CLINIC | Age: 70
End: 2025-04-03
Payer: MEDICARE

## 2025-04-03 NOTE — PROGRESS NOTES
Palliative referral nurse note    pt stated he is waiting to see what goes on when he goes to [facility], he will let us know about palliative after his visit with [facility]....

## 2025-04-04 ENCOUNTER — OFFICE VISIT (OUTPATIENT)
Dept: INTERVENTIONAL RADIOLOGY/VASCULAR | Facility: CLINIC | Age: 70
End: 2025-04-04
Payer: MEDICARE

## 2025-04-04 DIAGNOSIS — C78.7 METASTATIC CANCER TO LIVER: ICD-10-CM

## 2025-04-04 DIAGNOSIS — C34.32 ADENOCARCINOMA OF LOWER LOBE OF LEFT LUNG: Primary | ICD-10-CM

## 2025-04-04 NOTE — PROGRESS NOTES
Subjective     Patient ID: Ernie Walton is a 69 y.o. male.    Chief Complaint: Fatigue    Ernie Walton is a 69 y.o. male with pmh significant for systemic sclerosis/scleroderma (dx'd 2020, on mycophenolate mofetil, prednisone, and nintendinab), HTN, HLD, BPH, h/o nephrolithiasis w/ recurrent UTI, and DDD who presents to discuss stage IV lung cancer. He is here to discuss local regional therapy for liver mets. CC: fatigue and weakness. Reports he is currently on a chemo vacation because of this. He reports it is getting better.     PMH and medications reviewed.   Not taking any GLP-1 agonists.   No CPAP Or O2 use at home. Not difficulty laying flat.  Will schedule with moderate sedation.   Oncology clinic note reviewed.    Review of Systems   Constitutional:  Positive for appetite change and fatigue. Negative for unexpected weight change (unsure no scale).   Respiratory:  Negative for shortness of breath.    Cardiovascular:  Negative for chest pain.   Gastrointestinal:  Negative for abdominal pain.   Neurological:  Negative for facial asymmetry and speech difficulty.   Psychiatric/Behavioral:  Negative for behavioral problems and confusion.         Objective     Physical Exam  Constitutional:       Appearance: Normal appearance.      Comments: Virtual Visit.    HENT:      Head: Atraumatic.      Nose: Nose normal.   Eyes:      Conjunctiva/sclera: Conjunctivae normal.   Pulmonary:      Effort: Pulmonary effort is normal. No respiratory distress.   Neurological:      General: No focal deficit present.      Mental Status: He is alert and oriented to person, place, and time.   Psychiatric:         Mood and Affect: Mood normal.         Behavior: Behavior normal.          Assessment and Plan     1. 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  -     Bilirubin, Direct; Future; Expected date: 04/04/2025  -     CBC Auto Differential; Future; Expected date: 04/04/2025  -     Comprehensive Metabolic Panel; Future; Expected date:  04/04/2025  -     Protime-INR; Future; Expected date: 04/04/2025  -     IR Embolization for Tumor_Organ Ischemia; Future; Expected date: 04/04/2025  -     IR Embolization for Tumor_Organ Ischemia; Future; Expected date: 04/04/2025  -     NM Liver Imaging Static POST Y-90 Emboli; Future; Expected date: 04/04/2025  -     NM Liver Imaging Static PRE Y-90 Emboliz; Future; Expected date: 04/04/2025  -     NM Spect/CT Single Area; Future; Expected date: 04/04/2025  -     NM Spect/CT Single Area; Future; Expected date: 04/04/2025    2. Metastatic cancer to liver  -     Bilirubin, Direct; Future; Expected date: 04/04/2025  -     CBC Auto Differential; Future; Expected date: 04/04/2025  -     Comprehensive Metabolic Panel; Future; Expected date: 04/04/2025  -     Protime-INR; Future; Expected date: 04/04/2025  -     IR Embolization for Tumor_Organ Ischemia; Future; Expected date: 04/04/2025  -     IR Embolization for Tumor_Organ Ischemia; Future; Expected date: 04/04/2025  -     NM Liver Imaging Static POST Y-90 Emboli; Future; Expected date: 04/04/2025  -     NM Liver Imaging Static PRE Y-90 Emboliz; Future; Expected date: 04/04/2025  -     NM Spect/CT Single Area; Future; Expected date: 04/04/2025  -     NM Spect/CT Single Area; Future; Expected date: 04/04/2025    - Y90 to liver lesion. Referred by Dr. Gomez. Approved by Dr. Patel.   - Yttrium 90 Radioembolization discussed in detail with the patient including risks (including, but not limited to, pain, bleeding, infection, damage to nearby structures, and the need for additional procedures), benefits, potential complications, usual pre and post procedure course.  Discussed the need for initial Angiogram mapping study prior to scheduling the actual Radioembolization procedure. Utilized images from the patient's chart, the internet, and illustrations to help explain procedure. The patient voices understanding and all questions have been answered.  The patient agrees to  proceed as planned.  Patient scheduled for 4/22/2025.     The patient location is: [place]  The chief complaint leading to consultation is: Liver lesion    Visit type: audiovisual    45 minutes of total time spent on the encounter, which includes face to face time and non-face to face time preparing to see the patient (eg, review of tests), Obtaining and/or reviewing separately obtained history, Documenting clinical information in the electronic or other health record, Independently interpreting results (not separately reported) and communicating results to the patient/family/caregiver, or Care coordination (not separately reported).     Each patient to whom he or she provides medical services by telemedicine is:  (1) informed of the relationship between the physician and patient and the respective role of any other health care provider with respect to management of the patient; and (2) notified that he or she may decline to receive medical services by telemedicine and may withdraw from such care at any time.    Magui Bear PA-C  Interventional Radiology  [phone]

## 2025-04-05 DIAGNOSIS — M79.18 MUSCULOSKELETAL PAIN: ICD-10-CM

## 2025-04-05 DIAGNOSIS — T45.1X5A CHEMOTHERAPY-INDUCED PERIPHERAL NEUROPATHY: ICD-10-CM

## 2025-04-05 DIAGNOSIS — G62.0 CHEMOTHERAPY-INDUCED PERIPHERAL NEUROPATHY: ICD-10-CM

## 2025-04-07 RX ORDER — IBUPROFEN 800 MG/1
800 TABLET ORAL EVERY 8 HOURS PRN
Qty: 60 TABLET | Refills: 2 | Status: SHIPPED | OUTPATIENT
Start: 2025-04-07 | End: 2026-04-07

## 2025-04-07 RX ORDER — DULOXETIN HYDROCHLORIDE 60 MG/1
60 CAPSULE, DELAYED RELEASE ORAL DAILY
Qty: 30 CAPSULE | Refills: 3 | Status: SHIPPED | OUTPATIENT
Start: 2025-04-07 | End: 2025-08-05

## 2025-04-08 ENCOUNTER — TELEPHONE (OUTPATIENT)
Dept: PAIN MEDICINE | Facility: CLINIC | Age: 70
End: 2025-04-08
Payer: MEDICARE

## 2025-04-08 ENCOUNTER — OFFICE VISIT (OUTPATIENT)
Dept: PULMONOLOGY | Facility: CLINIC | Age: 70
End: 2025-04-08
Payer: MEDICARE

## 2025-04-08 VITALS — WEIGHT: 166.25 LBS | BODY MASS INDEX: 23.18 KG/M2 | DIASTOLIC BLOOD PRESSURE: 64 MMHG | SYSTOLIC BLOOD PRESSURE: 94 MMHG

## 2025-04-08 DIAGNOSIS — G89.29 CHRONIC LOW BACK PAIN WITHOUT SCIATICA, UNSPECIFIED BACK PAIN LATERALITY: ICD-10-CM

## 2025-04-08 DIAGNOSIS — J98.4 RESTRICTIVE LUNG DISEASE: Primary | ICD-10-CM

## 2025-04-08 DIAGNOSIS — Z85.118 HISTORY OF LUNG CANCER: ICD-10-CM

## 2025-04-08 DIAGNOSIS — M54.50 CHRONIC LOW BACK PAIN WITHOUT SCIATICA, UNSPECIFIED BACK PAIN LATERALITY: ICD-10-CM

## 2025-04-08 DIAGNOSIS — R05.3 CHRONIC COUGH: ICD-10-CM

## 2025-04-08 PROCEDURE — 99999 PR PBB SHADOW E&M-EST. PATIENT-LVL V: CPT | Mod: PBBFAC,,, | Performed by: INTERNAL MEDICINE

## 2025-04-08 PROCEDURE — 99204 OFFICE O/P NEW MOD 45 MIN: CPT | Mod: S$PBB,,, | Performed by: INTERNAL MEDICINE

## 2025-04-08 PROCEDURE — 99215 OFFICE O/P EST HI 40 MIN: CPT | Mod: PBBFAC | Performed by: INTERNAL MEDICINE

## 2025-04-08 RX ORDER — CHLORHEXIDINE GLUCONATE ORAL RINSE 1.2 MG/ML
SOLUTION DENTAL
COMMUNITY
Start: 2025-02-20

## 2025-04-08 RX ORDER — BENZONATATE 100 MG/1
100 CAPSULE ORAL 3 TIMES DAILY PRN
Qty: 90 CAPSULE | Refills: 2 | Status: SHIPPED | OUTPATIENT
Start: 2025-04-08 | End: 2025-07-07

## 2025-04-08 NOTE — PROGRESS NOTES
Initial Outpatient Pulmonary Evaluation       SUBJECTIVE:     Chief Complaint   Patient presents with    Shortness of Breath       History of Present Illness:    Patient is a 69 y.o. male history of systemic scleroderma and lung CA on chemotherapy started in April 2024 and currently following chemotherapy with Dr. Bailey chest oncology [place].      On prednisone CellCept sildenafil previously on Ofev discontinued due to coverage.      Complains of chronic neck and back pain.    Nagging cough.  Brother in the exam room.  Former smoker.  Quit recently.      Review of Systems   Respiratory:  Positive for cough, shortness of breath, dyspnea on extertion and use of rescue inhaler.    Cardiovascular:  Negative for chest pain.   Musculoskeletal:  Positive for back pain.       Review of patient's allergies indicates:  No Known Allergies    Current Medications[1]    Past Medical History:   Diagnosis Date    DDD (degenerative disc disease), lumbosacral     Low back pain     Lung cancer      History reviewed. No pertinent surgical history.  No family history on file.  Social History[2]       OBJECTIVE:     Vital Signs (Most Recent)  Vital Signs  BP: 94/64  Pain Score:   8  Pain Loc: Abdomen  Height and Weight  Weight: 75.4 kg (166 lb 3.6 oz)]  Wt Readings from Last 2 Encounters:   04/08/25 75.4 kg (166 lb 3.6 oz)   03/28/25 78.8 kg (173 lb 11.6 oz)         Physical Exam:  Physical Exam   Constitutional: He is oriented to person, place, and time. He appears well-developed and well-nourished.   Pulmonary/Chest: Normal expansion and effort normal.   Neurological: He is alert and oriented to person, place, and time.   Skin:   Scleroderma facial features   Psychiatric: He has a normal mood and affect. His behavior is normal. Judgment and thought content normal.       Laboratory  Lab Results   Component Value Date    WBC 10.01 03/25/2025    RBC 3.68 (L) 03/25/2025    HGB 9.0 (L)  "03/25/2025    HCT 30.5 (L) 03/25/2025    MCV 83 03/25/2025    MCH 24.5 (L) 03/25/2025    MCHC 29.5 (L) 03/25/2025    RDW 19.7 (H) 03/25/2025     03/25/2025    MPV 10.3 03/25/2025    GRAN 6.0 03/18/2025       BMP  Lab Results   Component Value Date     03/25/2025    K 3.6 03/25/2025     03/25/2025    CO2 25 03/25/2025    BUN 12 03/25/2025    CREATININE 0.8 03/25/2025    CALCIUM 9.8 03/25/2025    ANIONGAP 11 03/25/2025    AST 22 03/25/2025    ALT 15 03/25/2025    PROT 8.2 03/18/2025       No results found for: "BNP"    No results found for: "TSH"    Lab Results   Component Value Date    SEDRATE 129 (H) 02/24/2025       Lab Results   Component Value Date    CRP 11.5 (H) 02/24/2025       No results found for: "IGE"    No results found for: "ASPERGILLUS"  No results found for: "AFUMIGATUSCL"     No results found for: "ACE"    Diagnostic Results:  I have personally reviewed today the following studies :      Ct CHEST ABD PELVIS    CT CHEST ABDOMEN PELVIS WITH IV CONTRAST (XPD)     CLINICAL HISTORY:  Lung cancer; Malignant neoplasm of lower lobe, left bronchus or lung     TECHNIQUE:  Low dose axial images, sagittal and coronal reformations were obtained from the thoracic inlet to the pubic symphysis following the IV administration of 100 mL of Omnipaque 350 .  No oral contrast     COMPARISON:  CT chest, abdomen and pelvis 11/25/2024     FINDINGS:  CT chest:     No pericardial effusions.  Coronary arterial calcifications are present.  Right chest port present.     Enlarged left hilar lymph node measuring 1.8 by 1.5 cm compared to 2.0 by 1.5 cm on the prior examination (remeasured by me today).  The superior segment left lower lobe irregular shaped nodule measures 2.6 x 2.6 cm compared to prior examination 2.5 x 2.7 cm (remeasured by me today).  Stable areas of interstitial thickening in the lungs.  Trivial areas of smooth pleural thickening and/or fluid in the inferior shannon thoraces.     CT abdomen and " pelvis:     There is a new small low-density lesion in the posterior segment right hepatic lobe at the dome of the liver (segment 7) measuring 11 by 9 mm.  No additional lesions present within the liver.  Normal gallbladder.  No biliary ductal dilatation.     Normal spleen, pancreas.  Normal adrenals.  Kidneys unremarkable.  Bowel nonobstructed.     Enlarged prostate.  Normal bladder.  No pelvic masses or adenopathy.     Advanced calcified plaque throughout the vasculature.     Bones: Scattered osteo sclerotic lesions appear stable.     Impression:     CT chest: Stable exam.     -left lower lobe superior segment nodularity and left hilar adenopathy  without significant change.     -no new findings.     CT abdomen and pelvis: Slight progression of disease.     -new small lesion at dome of liver concerning for metastasis (segment 7).     -no additional areas of metastatic disease.     Bones: Stable scattered areas of osteo sclerotic disease.     ASSESSMENT/PLAN:     Restrictive lung disease  -     Complete PFT with bronchodilator; Future  -     Stress test, pulmonary; Future    Chronic cough  -     benzonatate (TESSALON) 100 MG capsule; Take 1 capsule (100 mg total) by mouth 3 (three) times daily as needed for Cough.  Dispense: 90 capsule; Refill: 2    Chronic low back pain without sciatica, unspecified back pain laterality  -     Ambulatory referral/consult to Pain Clinic; Future; Expected date: 04/15/2025    History of lung cancer        Continue prednisone mycophenolate     Continue sildenafil    Repeat PFT and 6 minute walk.      Start Tessalon     Follow-up with Oncology     Referred to pain management.            Follow up in about 3 months (around 7/8/2025).    This note was prepared using voice recognition system and is likely to have sound alike errors that may have been overlooked even after proof reading.  Please call me with any questions    Discussed diagnosis, its evaluation, treatment and usual course.  All questions answered.    Thank you for the courtesy of participating in the care of this patient    Raul Lagunas MD               [1]   Current Outpatient Medications   Medication Sig Dispense Refill    chlorhexidine (PERIDEX) 0.12 % solution RINSE MOUTH WITH 15 ML ONCE DAILY IN THE EVENING      amLODIPine (NORVASC) 5 MG tablet Take 1 tablet by mouth once daily. (Patient not taking: Reported on 4/4/2025)      atorvastatin (LIPITOR) 40 MG tablet Take 1 tablet by mouth once daily. (Patient not taking: Reported on 4/4/2025)      benzonatate (TESSALON) 100 MG capsule Take 1 capsule (100 mg total) by mouth 3 (three) times daily as needed for Cough. 90 capsule 2    cyanocobalamin 1,000 mcg/mL injection Inject 1,000 mcg into the muscle every 30 days.      diphenhydrAMINE (BENADRYL) 50 MG capsule Take 50mg by mouth 1 hour before contrast administration. 1 capsule 0    diphenhydrAMINE-aluminum-magnesium hydroxide-simethicone-LIDOcaine viscous HCl 2% Swish and spit 15 mLs every 4 (four) hours as needed. 1 each 2    DULoxetine (CYMBALTA) 60 MG capsule Take 1 capsule (60 mg total) by mouth once daily. for 120 doses 30 capsule 3    ferrous sulfate (FEOSOL) 325 mg (65 mg iron) Tab tablet Take 325 mg by mouth with breakfast.      fluticasone propionate (FLONASE) 50 mcg/actuation nasal spray 1 spray by Each Nostril route once daily.      folic acid, bulk, 100 % Powd Take 666 mcg by mouth.      ibuprofen (ADVIL,MOTRIN) 800 MG tablet Take 1 tablet (800 mg total) by mouth every 8 (eight) hours as needed for Pain. 60 tablet 2    Lactobacillus rhamnosus GG (CULTURELLE) 10 billion cell capsule Take 1 capsule by mouth every morning.      LIDOcaine (LIDODERM) 5 % Place 1 patch onto the skin once daily. Remove & Discard patch within 12 hours or as directed by MD 10 patch 0    LIDOcaine 5 % Crea SMARTSIG:Sparingly Topical 3 Times Daily      LIDOcaine viscous HCl 2% (XYLOCAINE) 2 % Soln       lisinopriL 10 MG tablet Take 2 tablets by  mouth once daily.      magic mouthwash diphen/antac/lidoc Swish and spit 15 mLs every 4 (four) hours as needed. 450 mL 2    mupirocin (BACTROBAN) 2 % ointment Apply 1 Application topically.      MYCOPHENOLATE SODIUM ORAL Take 1,500 mg by mouth.      nintedanib (OFEV) 100 mg Cap Take 100 mg by mouth 2 (two) times daily.      omega 3-dha-epa-fish oil 1,000 mg (120 mg-180 mg) Cap Take 1,000 mg by mouth.      ondansetron (ZOFRAN-ODT) 4 MG TbDL Take 4 mg by mouth.      pantoprazole (PROTONIX) 40 MG tablet Take 40 mg by mouth.      phenazopyridine (PYRIDIUM) 200 MG tablet Take 200 mg by mouth 3 (three) times daily as needed.      predniSONE (DELTASONE) 50 MG Tab Take 50mg by mouth at 13 hours, 7 hours, and 1 hour before contrast administration. 3 tablet 0    predniSONE, bulk, Powd Take 7.5 mg by mouth.      pregabalin (LYRICA) 75 MG capsule Take 75 mg by mouth 2 (two) times daily.      raloxifene (EVISTA) 60 mg tablet Take 1 tablet by mouth once daily.      sildenafil (REVATIO) 20 mg Tab Take 1 tablet by mouth 3 (three) times daily.      tamsulosin (FLOMAX) 0.4 mg Cap Take 1 capsule by mouth once daily. (Patient not taking: Reported on 4/4/2025)      triamcinolone acetonide 0.1% (KENALOG) 0.1 % ointment APPLY OINTMENT TOPICALLY TO AFFECTED AREA TWICE DAILY FOR 30 DAYS, FOR LEFT RING FINGER.      vitamin D (VITAMIN D3) 1000 units Tab Take by mouth once daily.      vitamin E 100 unit/0.25 mL Drop Take 180 Units by mouth.      vitamin E, dl, acetate, 90 mg (200 unit) Cap Take 1 capsule by mouth every morning.       No current facility-administered medications for this visit.   [2]   Social History  Tobacco Use    Smoking status: Former     Types: Cigarettes     Passive exposure: Never    Smokeless tobacco: Former

## 2025-04-08 NOTE — TELEPHONE ENCOUNTER
Attempt to reach patient to schedule appointment with one of our pain providers. The patient did not answer. Left voice message on patients voice box to call back at earliest convenience to schedule apt.     Néstor Treviño  Medical Assistant

## 2025-04-08 NOTE — TELEPHONE ENCOUNTER
----- Message from Raul Lagunas MD sent at 4/8/2025 11:45 AM CDT -----  PLZ SCHEDULE PAIN MAGT LEE

## 2025-04-15 ENCOUNTER — PATIENT MESSAGE (OUTPATIENT)
Dept: INTERVENTIONAL RADIOLOGY/VASCULAR | Facility: HOSPITAL | Age: 70
End: 2025-04-15
Payer: MEDICARE

## 2025-04-15 ENCOUNTER — LAB VISIT (OUTPATIENT)
Dept: LAB | Facility: HOSPITAL | Age: 70
End: 2025-04-15
Attending: HOSPITALIST
Payer: MEDICARE

## 2025-04-15 ENCOUNTER — OFFICE VISIT (OUTPATIENT)
Dept: HEMATOLOGY/ONCOLOGY | Facility: CLINIC | Age: 70
End: 2025-04-15
Payer: MEDICARE

## 2025-04-15 ENCOUNTER — INFUSION (OUTPATIENT)
Dept: INFUSION THERAPY | Facility: HOSPITAL | Age: 70
End: 2025-04-15
Attending: HOSPITALIST
Payer: MEDICARE

## 2025-04-15 ENCOUNTER — TELEPHONE (OUTPATIENT)
Dept: HEMATOLOGY/ONCOLOGY | Facility: CLINIC | Age: 70
End: 2025-04-15
Payer: MEDICARE

## 2025-04-15 VITALS
WEIGHT: 172.38 LBS | DIASTOLIC BLOOD PRESSURE: 81 MMHG | RESPIRATION RATE: 16 BRPM | SYSTOLIC BLOOD PRESSURE: 127 MMHG | TEMPERATURE: 98 F | HEART RATE: 92 BPM | HEIGHT: 71 IN | BODY MASS INDEX: 24.13 KG/M2 | OXYGEN SATURATION: 95 %

## 2025-04-15 VITALS
SYSTOLIC BLOOD PRESSURE: 120 MMHG | HEIGHT: 71 IN | DIASTOLIC BLOOD PRESSURE: 74 MMHG | HEART RATE: 125 BPM | WEIGHT: 172.38 LBS | BODY MASS INDEX: 24.13 KG/M2 | TEMPERATURE: 98 F

## 2025-04-15 DIAGNOSIS — C34.32 ADENOCARCINOMA OF LOWER LOBE OF LEFT LUNG: ICD-10-CM

## 2025-04-15 DIAGNOSIS — K12.30 MUCOSITIS: ICD-10-CM

## 2025-04-15 DIAGNOSIS — G62.0 CHEMOTHERAPY-INDUCED PERIPHERAL NEUROPATHY: ICD-10-CM

## 2025-04-15 DIAGNOSIS — T45.1X5A CHEMOTHERAPY-INDUCED PERIPHERAL NEUROPATHY: ICD-10-CM

## 2025-04-15 DIAGNOSIS — M34.9 SCLERODERMA: ICD-10-CM

## 2025-04-15 DIAGNOSIS — C78.7 SECONDARY MALIGNANT NEOPLASM OF LIVER: ICD-10-CM

## 2025-04-15 DIAGNOSIS — C34.32 ADENOCARCINOMA OF LOWER LOBE OF LEFT LUNG: Primary | ICD-10-CM

## 2025-04-15 DIAGNOSIS — C77.1 SECONDARY MALIGNANT NEOPLASM OF INTRATHORACIC LYMPH NODES: ICD-10-CM

## 2025-04-15 DIAGNOSIS — C79.51 SECONDARY MALIGNANT NEOPLASM OF BONE: ICD-10-CM

## 2025-04-15 LAB
ABSOLUTE NEUTROPHIL COUNT (OHS): 7.24 K/UL (ref 1.8–7.7)
ALBUMIN SERPL BCP-MCNC: 2.3 G/DL (ref 3.5–5.2)
ALP SERPL-CCNC: 83 UNIT/L (ref 40–150)
ALT SERPL W/O P-5'-P-CCNC: 11 UNIT/L (ref 10–44)
ANION GAP (OHS): 11 MMOL/L (ref 8–16)
AST SERPL-CCNC: 18 UNIT/L (ref 11–45)
BACTERIA #/AREA URNS AUTO: ABNORMAL /HPF
BILIRUB SERPL-MCNC: 0.2 MG/DL (ref 0.1–1)
BILIRUB UR QL STRIP.AUTO: NEGATIVE
BUN SERPL-MCNC: 11 MG/DL (ref 8–23)
CALCIUM SERPL-MCNC: 9.4 MG/DL (ref 8.7–10.5)
CHLORIDE SERPL-SCNC: 104 MMOL/L (ref 95–110)
CLARITY UR: CLEAR
CO2 SERPL-SCNC: 23 MMOL/L (ref 23–29)
COLOR UR AUTO: YELLOW
CREAT SERPL-MCNC: 0.7 MG/DL (ref 0.5–1.4)
ERYTHROCYTE [DISTWIDTH] IN BLOOD BY AUTOMATED COUNT: 19.2 % (ref 11.5–14.5)
GFR SERPLBLD CREATININE-BSD FMLA CKD-EPI: >60 ML/MIN/1.73/M2
GLUCOSE SERPL-MCNC: 107 MG/DL (ref 70–110)
GLUCOSE UR QL STRIP: NEGATIVE
HCT VFR BLD AUTO: 31.1 % (ref 40–54)
HGB BLD-MCNC: 9.1 GM/DL (ref 14–18)
HGB UR QL STRIP: NEGATIVE
IMM GRANULOCYTES # BLD AUTO: 0.04 K/UL (ref 0–0.04)
KETONES UR QL STRIP: NEGATIVE
LEUKOCYTE ESTERASE UR QL STRIP: ABNORMAL
MCH RBC QN AUTO: 23.9 PG (ref 27–31)
MCHC RBC AUTO-ENTMCNC: 29.3 G/DL (ref 32–36)
MCV RBC AUTO: 82 FL (ref 82–98)
MICROSCOPIC COMMENT: ABNORMAL
NITRITE UR QL STRIP: NEGATIVE
PH UR STRIP: 6 [PH]
PLATELET # BLD AUTO: 342 K/UL (ref 150–450)
PMV BLD AUTO: 9.7 FL (ref 9.2–12.9)
POTASSIUM SERPL-SCNC: 3.5 MMOL/L (ref 3.5–5.1)
PROT SERPL-MCNC: 7.9 GM/DL (ref 6–8.4)
PROT UR QL STRIP: ABNORMAL
RBC # BLD AUTO: 3.81 M/UL (ref 4.6–6.2)
RBC #/AREA URNS AUTO: 2 /HPF (ref 0–4)
SODIUM SERPL-SCNC: 138 MMOL/L (ref 136–145)
SP GR UR STRIP: 1.02
UROBILINOGEN UR STRIP-ACNC: NEGATIVE EU/DL
WBC # BLD AUTO: 9.1 K/UL (ref 3.9–12.7)
WBC #/AREA URNS AUTO: 13 /HPF (ref 0–5)

## 2025-04-15 PROCEDURE — 36415 COLL VENOUS BLD VENIPUNCTURE: CPT

## 2025-04-15 PROCEDURE — 99215 OFFICE O/P EST HI 40 MIN: CPT | Mod: S$PBB,,, | Performed by: HOSPITALIST

## 2025-04-15 PROCEDURE — 96367 TX/PROPH/DG ADDL SEQ IV INF: CPT

## 2025-04-15 PROCEDURE — 25000003 PHARM REV CODE 250: Performed by: HOSPITALIST

## 2025-04-15 PROCEDURE — 96413 CHEMO IV INFUSION 1 HR: CPT

## 2025-04-15 PROCEDURE — 84132 ASSAY OF SERUM POTASSIUM: CPT

## 2025-04-15 PROCEDURE — G2211 COMPLEX E/M VISIT ADD ON: HCPCS | Mod: S$PBB,,, | Performed by: HOSPITALIST

## 2025-04-15 PROCEDURE — 85027 COMPLETE CBC AUTOMATED: CPT

## 2025-04-15 PROCEDURE — 99215 OFFICE O/P EST HI 40 MIN: CPT | Mod: PBBFAC | Performed by: HOSPITALIST

## 2025-04-15 PROCEDURE — 63600175 PHARM REV CODE 636 W HCPCS: Performed by: HOSPITALIST

## 2025-04-15 PROCEDURE — 96375 TX/PRO/DX INJ NEW DRUG ADDON: CPT

## 2025-04-15 PROCEDURE — 96417 CHEMO IV INFUS EACH ADDL SEQ: CPT

## 2025-04-15 PROCEDURE — 99999 PR PBB SHADOW E&M-EST. PATIENT-LVL V: CPT | Mod: PBBFAC,,, | Performed by: HOSPITALIST

## 2025-04-15 PROCEDURE — 81003 URINALYSIS AUTO W/O SCOPE: CPT

## 2025-04-15 PROCEDURE — A4216 STERILE WATER/SALINE, 10 ML: HCPCS | Performed by: HOSPITALIST

## 2025-04-15 RX ORDER — DIPHENHYDRAMINE HYDROCHLORIDE 50 MG/ML
50 INJECTION, SOLUTION INTRAMUSCULAR; INTRAVENOUS ONCE AS NEEDED
Status: DISCONTINUED | OUTPATIENT
Start: 2025-04-15 | End: 2025-04-15 | Stop reason: HOSPADM

## 2025-04-15 RX ORDER — SODIUM CHLORIDE 0.9 % (FLUSH) 0.9 %
10 SYRINGE (ML) INJECTION
Status: DISCONTINUED | OUTPATIENT
Start: 2025-04-15 | End: 2025-04-15 | Stop reason: HOSPADM

## 2025-04-15 RX ORDER — HEPARIN 100 UNIT/ML
500 SYRINGE INTRAVENOUS
Status: CANCELLED | OUTPATIENT
Start: 2025-04-15

## 2025-04-15 RX ORDER — EPINEPHRINE 0.3 MG/.3ML
0.3 INJECTION SUBCUTANEOUS ONCE AS NEEDED
Status: DISCONTINUED | OUTPATIENT
Start: 2025-04-15 | End: 2025-04-15 | Stop reason: HOSPADM

## 2025-04-15 RX ORDER — DIPHENHYDRAMINE HYDROCHLORIDE 50 MG/ML
25 INJECTION, SOLUTION INTRAMUSCULAR; INTRAVENOUS
Status: COMPLETED | OUTPATIENT
Start: 2025-04-15 | End: 2025-04-15

## 2025-04-15 RX ORDER — SODIUM CHLORIDE 0.9 % (FLUSH) 0.9 %
10 SYRINGE (ML) INJECTION
Status: CANCELLED | OUTPATIENT
Start: 2025-04-15

## 2025-04-15 RX ORDER — HEPARIN 100 UNIT/ML
500 SYRINGE INTRAVENOUS
Status: DISCONTINUED | OUTPATIENT
Start: 2025-04-15 | End: 2025-04-15 | Stop reason: HOSPADM

## 2025-04-15 RX ORDER — DIPHENHYDRAMINE HYDROCHLORIDE 50 MG/ML
50 INJECTION, SOLUTION INTRAMUSCULAR; INTRAVENOUS ONCE AS NEEDED
Status: CANCELLED | OUTPATIENT
Start: 2025-04-15

## 2025-04-15 RX ORDER — DIPHENHYDRAMINE HYDROCHLORIDE 50 MG/ML
25 INJECTION, SOLUTION INTRAMUSCULAR; INTRAVENOUS
Status: CANCELLED
Start: 2025-04-15

## 2025-04-15 RX ORDER — EPINEPHRINE 0.3 MG/.3ML
0.3 INJECTION SUBCUTANEOUS ONCE AS NEEDED
Status: CANCELLED | OUTPATIENT
Start: 2025-04-15

## 2025-04-15 RX ORDER — PROCHLORPERAZINE EDISYLATE 5 MG/ML
5 INJECTION INTRAMUSCULAR; INTRAVENOUS ONCE AS NEEDED
Status: CANCELLED | OUTPATIENT
Start: 2025-04-15

## 2025-04-15 RX ADMIN — DOCETAXEL 118 MG: 20 INJECTION, SOLUTION, CONCENTRATE INTRAVENOUS at 01:04

## 2025-04-15 RX ADMIN — DIPHENHYDRAMINE HYDROCHLORIDE 25 MG: 50 INJECTION, SOLUTION INTRAMUSCULAR; INTRAVENOUS at 12:04

## 2025-04-15 RX ADMIN — Medication 10 ML: at 02:04

## 2025-04-15 RX ADMIN — SODIUM CHLORIDE 782 MG: 9 INJECTION, SOLUTION INTRAVENOUS at 12:04

## 2025-04-15 RX ADMIN — SODIUM CHLORIDE 20 MG: 9 INJECTION, SOLUTION INTRAVENOUS at 12:04

## 2025-04-15 RX ADMIN — HEPARIN SODIUM (PORCINE) LOCK FLUSH IV SOLN 100 UNIT/ML 500 UNITS: 100 SOLUTION at 02:04

## 2025-04-15 RX ADMIN — SODIUM CHLORIDE: 9 INJECTION, SOLUTION INTRAVENOUS at 12:04

## 2025-04-15 NOTE — DISCHARGE INSTRUCTIONS
.[facility]  [address] [facility] [address] [facility]  [address]  [phone] phone     [phone] fax  Hours of Operation: Monday- Friday 8:00am- 5:00pm  After hours phone  [phone]  Hematology / Oncology Physicians on call    Dr. Israel Dewitt        Nurse Practitioners:    Shabnam Boyle, KERRIE Garzon, KERRIE Don, KERRIE Isaac, KERRIE Marcos, PA      Please don't hesitate to call if you have any concerns.

## 2025-04-15 NOTE — PROGRESS NOTES
Labs acceptable (hgb stable 9.1), C6 docetaxel/ramucirumab signed.     Trace protein in UA. 1+ LE w/ 13 WBC, will confirm no dysuria, polyuria, suprapubic tenderness, low back pain, or malodorous urine.

## 2025-04-15 NOTE — PROGRESS NOTES
[facility] MEDICAL ONCOLOGY - FOLLOW UP VISIT    Reason for visit: Second opinion, Stage IV Adenocarcinoma of the Lung      Oncology History   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   5/15/2024 Imaging Significant Findings    CT C, Non-Con (f/u after imaging for scleroderma clinical trial workup)  - 2.5 x 2.4 x 1.8 cm LLL nodule (previously 2.2 x 2.2 x 1.8 cm, 3/25/24)  - Patchy ground-glass and nodular opacities adjacent to LLL nodule  - 1.4 cm left hilar lymph node  - Right lower paratracheal lymphadenopathy  - Lytic lesion with erosive changes in T2 vertebral body, new lytic lesions in manubrium, right fourth rib, T3, T7, and T11 vertebral bodies with pathologic fracture of superior endplate of T3 vertebral body     5/15/2024 Procedure    Bronch w/ EBUS  LLL TBBx  - Adenocarcinoma (Positive: TTF-1; Negative: p40)    LLL, Core Biopsy  - Adenocarcinoma    LN  - Positive: Station 11L  - Negative: Station 4R, 7, 4L    Caris NGS / PD-L1      - KEAP1, TP53, STK11 mutated  - ERBB2 negative / 1+  - TMB 7  - TORIE       6/5/2024 Imaging Significant Findings    PET CT  - 2.2 cm LLL mass, SUV 4.8  - 1.6 cm left hilar node, SUV 6.8  - 1.3 cm right lower paratracheal node, SUV 2.2  - Hypermetabolic lytic lesions in multiple bones (C7 transverse process, multiple thoracic, lumbar, and sacral segments, sternum, right scapular body, left scapular angle, several ribs, left ilium, left pubic body, bilateral ischia)     7/5/2024 Imaging Significant Findings    MRI Brain  - LANNY in brain or dura  - Mild enhancement within clivus, cannot rule out bony met     7/8/2024 Initial Diagnosis    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     7/8/2024 Cancer Staged    Staging form: Lung, AJCC 8th Edition  - Clinical: Stage IVB (cT2, cN1, pM1c)     8/9/2024 Imaging Significant Findings    MRI C/T/L Spine  Diffuse osseous metastasis throughout the visualized spine without any evidence of significant  compression fracture or retropulsion with involvement of the vertebral bodies and posterior elements at multiple levels.   No evidence of cervical/thoracic spinal cord compression with impingement of the ventral aspect of the cord from adjacent disc disease at multiple levels as discussed above.   Discal disease with foraminal stenosis at multiple levels throughout the spine with most prominent involvement seen at L4-5 and L5-S1 levels mostly degenerative in nature.   No abnormal intraspinal enhancement with no epidural enhancement noted        9/27/2024 Imaging Significant Findings    PET CT (compared against PET CT from 6/5/24)  1.4 x 1.0 cm LLL nodule (previously 2.0 x 1.3 cm), SUV 6.8 (similar)  New fairly defined opacity adjacent to the primary mass measuring up 1.8. The activity overlying the primary tumor is now confluent with the new opacity, leading to increased extent of activity.   1.2 x 2.0 cm L hilar LN (previously 1.4 x 2.2 cm), SUV 5.2  Numerous FDG-avid bone metastases are noted. Although previously visualized lesions show some areas of improvement, there are multiple new active bone metastases (L4, T8). The pattern suggests initial response to treatment with subsequent progressive disease      11/25/2024 Imaging Significant Findings    11/25/24 -- CT C/A/P  2.9 x 2.0 cm spiculated LLL pulmonary nodule  2.2 x 1.9 cm left hilar lymph node  Dependent pulmonary infiltrates  Extensive osseous metastatic disease (scattered lytic and sclerotic lesions throughout thoracic spine, manubrium, left scapula, pelvis, sacrum, and lumbar spine)     12/23/2024 -  Chemotherapy    Docetaxel/Ramucirumab  21 day cycle  Docetaxel 75 mg/m2  Ramucirumab 10 mg/kg     2/24/2025 Imaging Significant Findings    CT C/A/P  Stable 2.6 x 2.6 LLL nodule, previously 2.5 x 2.7 cm  Stable 1.8 x 1.5 cm left hilar lymph node, previously 2.0 x 1.5 cm  New 1.1 x 0.9 cm right hepatic lobe lesion  Stable scattered areas of osteo sclerotic  disease     3/5/2025 Imaging Significant Findings    MRI Abdomen  1.2 cm right hepatic dome lesion consistent with metastatic disease, no additional liver lesions        Previous Workup:   - 6/5/24:  Medical oncology (Dr. Erik Clay), discussed carboplatin/paclitaxel, plan to proceed  - 6/5/24: C1D1 carboplatin/paclitaxel  - 6/26/24: C2D1 carboplatin/paclitaxel    Oncology History    HPI:     Ernie Walton is a 69 y.o. male with pmh significant for systemic sclerosis/scleroderma (dx'd 2020, on mycophenolate mofetil, prednisone, and nintendinab), HTN, HLD, BPH, h/o nephrolithiasis w/ recurrent UTI, and DDD who presents for f/u of the below lung cancer. His primary oncologist has been Dr. Erik Clay, and he has established care with Dr. Holley ([facility] [place] [facility]).    1)  Stage IVB ([id number]) adenocarcinoma of the LLL (PD-L1 TPS 1%, no targetable mutations, STK11/TP53/KEAP1 co-mutations) w/ diffuse osseous disease , initially dx'd 5/15/24, s/p carboplatin/paclitaxel (6/5/24 - 9/17/24), radiographic evidence of progression in the left lung and skeleton, currently on 2L docetaxel/ramucirumab (12/23/24-present; DR starting C5 for mucositis, fatigue, anorexia)    Last clinic 3/18/25, hold C5 docetaxel/ramucirumab due to progressive fatigue and decreased appetite per patient request.  Try for C5 on 3/25/25 with dose reduced docetaxel to 60 mg per m2.  C6 docetaxel/ramucirumab on 4/15/25.  Message sent to radiation oncology regarding IR embolization of hepatic disease.  Referral to palliative care.  Continue duloxetine for peripheral neuropathy.  Dental clearance pending for bone mets.    Interval History:  3/25/25:  C5 docetaxel/ramucirumab, dose reduced  3/28/25: Radiation oncology, discuss tumor treating fields or metastatic lung cancer, patient declined.  Plan for SIRsphere for liver lesion.   4/4/25:  Interventional radiology, plan for Y90 to liver lesion, scheduled 4/22/25.   4/8/25: Pulmonology, repeat PFTs and 6 minute walk  "test, start Tessalon Perles, refer to pain management, RTC in 3 months.    Since our last visit, the pt states that he is "slowly getting better". He says that his fatigue is slowly improving. This has been since the past few.     He also feels that his appetite has been slowly improving as well. This has been since the past few days.     He continues to ambulate with a cane.     He feels that his cough has improved since our last visit. He credits the Tessalon Perles.     Pt denies N/V, diarrhea, constipation, rash, or new/worsening SOB.      ROS:   As per HPI.       Physical Exam:       There were no vitals taken for this visit.               Physical Exam  Constitutional:       Appearance: Normal appearance.   HENT:      Head: Normocephalic and atraumatic.   Eyes:      Extraocular Movements: Extraocular movements intact.      Conjunctiva/sclera: Conjunctivae normal.      Pupils: Pupils are equal, round, and reactive to light.   Cardiovascular:      Rate and Rhythm: Normal rate and regular rhythm.      Heart sounds: No murmur heard.     No friction rub. No gallop.   Pulmonary:      Effort: Pulmonary effort is normal.      Breath sounds: No wheezing, rhonchi or rales.   Musculoskeletal:         General: Normal range of motion.      Right lower leg: No edema.      Left lower leg: No edema.      Comments: Sclerodactyly evident.     Skin:     General: Skin is warm and dry.      Comments: Dystrophic L thumb nail, desquamation over multiple fingers   Neurological:      Mental Status: He is alert and oriented to person, place, and time.   Psychiatric:         Mood and Affect: Mood normal.         Thought Content: Thought content normal.         Judgment: Judgment normal.         Imaging:    See oncologic history above.     Path:  See oncologic history above.      Assessment and Plan:     Ernie Walton is a 69 y.o. male with pmh significant for systemic sclerosis/scleroderma (dx'd 2020, on mycophenolate mofetil, prednisone, " and nintendinab), HTN, HLD, BPH, h/o nephrolithiasis w/ recurrent UTI, and DDD who presents for f/u of the below lung cancer. His primary oncologist has been Dr. Erik Clay, and he has established care with Dr. Holley ([facility]).    1)  Stage IVB ([id number]) adenocarcinoma of the LLL (PD-L1 TPS 1%, no targetable mutations, STK11/TP53/KEAP1 co-mutations) w/ diffuse osseous disease , initially dx'd 5/15/24, s/p carboplatin/paclitaxel (6/5/24 - 9/17/24), radiographic evidence of progression in the left lung and skeleton, currently on 2L docetaxel/ramucirumab (12/23/24-present; DR starting C5 for mucositis, fatigue, anorexia)    Stage IVB Adenocarcinoma of the LLL. PD-L1 1%, No Targetable Mutations  ECOG PS 1.  Patient is currently on docetaxel/ramucirumab, treatment complicated by mucositis, fatigue, and decreased appetite (worsening with subsequent doses; improved with C5 dose delay and subsequent dose reduction).  His most recent imaging (CT C/A/P, 2/24/25, after 3 cycles of docetaxel/ramucirumab) demonstrates stable LLL nodule (2.6 x 2.6 cm), stable left hilar lymph node, 1.8 x 1.5 cm), stable scattered areas of osseous disease, and a new 1.1 x 0.9 cm right hepatic lobe lesion.  Regarding the former, these lesions had notably demonstrated growth between scans on 9/27/24 and 11/25/24, indicating likely treatment effect in these areas given stability now. Regarding the hepatic lobe lesion, subsequent MRI imaging is consistent with metastatic disease. Given control of disease elsewhere, as well as scant systemic therapy options beyond the current line of treatment, favor attempting to manage the hepatic lesion w/ local therapies and continuing systemic therapy for the remainder of his disease. Pt has met with IR and is currently pending Y90 ablation of this lesion, with mapping scheduled 4/22/25. Discussed with IR, okay to proceed with docetaxel/ramucirumab in the interim; if needed, will hold subsequent dose prior to  actual procedure. Therefore, will continue with docetaxel/ramucirumab until progression or intolerance per the REVEL trial.    Of note, patient previously discussed with the patient's rheumatologist Dr. Cordero who is in agreement to with holding immunotherapy in the setting of patient's significant autoimmune conditions.    Dose Reductions  C5-present (fatigue, mucositis, anorexia):  Docetaxel 60 mg/m2    PLAN  Proceed with dose reduced C6 docetaxel/ramucirumab today (4/15/25), will sign pending return of labs.  Confirmed with Interventional Radiology okay for today's dose.  Mapping for Y90 ablation of liver disease on 4/22/25  Labs (CBC, CMP, UA) and C7 docetaxel/ramucirumab on 5/6/25, dose reduced docetaxel to 60 mg/m2  Referral to palliative care given stage IV disease      Peripheral Neuropathy  See previous note for description. Given timeline, most consistent with chemotherapy induced PN. Pt previously on pregabalin to minimal effect, transitioned to duloxetine. Pt endorses significant improvement since starting both duloxetine and starting acupuncture, noting he is able to feel acupuncture in parts of his feet which were previously insensate.   Continue duloxetine 60 mg daily  Continue acupuncture      Bone Mets  Pt w/ diffuse osseous disease involving the C/T/L/S spine, sternum, R scapular body, L scapular angle, several ribs, L ilium, L pubic body, and b/l ischia. Pt was previously on xgeva, last dose on 9/17/24. On discussion with Dr. Clay's staff, the patient did NOT receive dental clearance in advance of this. The pt confirms this, and previously noted that he was planning to undergo a dental extraction in 1/2025. He has since had two teeth extracted  He saw his dentist on 2/11/25 and has not been cleared yet, pending ongoing healing of his previous extraction site; he had a follow up on 3/23/25 to discuss further. Discussed need to hold on further osteoclast inhibition in advance of this. Upon dental  clearance, will transition to zometa q12w due to distance from [place].   Dental clearance pending, pt states he will meet with a dentist on 4/23/25      Scleroderma  Currently under the care of her rheumatologist in [place], on mycophenolate mofetil, nintendinab, and prednisone.  Active treatment precludes safe utilization of immunotherapy. Discussed with patient's primary rheumatologist Dr. Cordero, who both agreed with holding immunotherapy as well as with proceeding with ramucirumab.   F/u w/ Dr. Cordero on 5/1/25      Mucositis  Resolved.   CTM      Cough  Likely due to lung cancer. Improving.   Continue guaifenesin-codeine  Continue benzonatate      The above information has been reviewed with the patient and all questions have been answered to their apparent satisfaction.  They understand that they can call the clinic with any questions.    Rudi Granados MD  Hematology/Oncology  [facility]      Route Chart for Scheduling    Med Onc Chart Routing      Follow up with physician . Labs (CBC, CMP, UA) and C7 docetaxel/ramucirumab on 5/6/25   Follow up with ADRIANNA    Infusion scheduling note    Injection scheduling note    Labs    Imaging    Pharmacy appointment    Other referrals                Disclaimer: This note was prepared using voice recognition system and is likely to have sound alike errors.  Please call me with any questions.

## 2025-04-15 NOTE — PLAN OF CARE
Problem: Adult Inpatient Plan of Care  Goal: Plan of Care Review  Outcome: Progressing  Flowsheets (Taken 4/15/2025 1247)  Plan of Care Reviewed With: patient  Goal: Patient-Specific Goal (Individualized)  Outcome: Progressing  Flowsheets (Taken 4/15/2025 1247)  Individualized Care Needs: patient likes feet elevated, warm blanet, and pillow  Anxieties, Fears or Concerns: Patient reports feeling tired today  Patient/Family-Specific Goals (Include Timeframe): patient tolerated treatment well with no adverse reactions.  Goal: Optimal Comfort and Wellbeing  Outcome: Progressing     Problem: Fall Injury Risk  Goal: Absence of Fall and Fall-Related Injury  Outcome: Progressing     Problem: Chemotherapy Effects  Goal: Absence of Infection  Outcome: Progressing

## 2025-04-15 NOTE — TELEPHONE ENCOUNTER
SW returned pt's vm call. Pt requesting Boost and gas card. Pt stated he will have SW bring down when done with treatment. SW will provide Chocolate Boost and $50 and $25 [employer] gas card.

## 2025-04-21 ENCOUNTER — DOCUMENTATION ONLY (OUTPATIENT)
Dept: PREADMISSION TESTING | Facility: HOSPITAL | Age: 70
End: 2025-04-21
Payer: MEDICARE

## 2025-04-21 ENCOUNTER — LAB VISIT (OUTPATIENT)
Dept: LAB | Facility: HOSPITAL | Age: 70
End: 2025-04-21
Payer: MEDICARE

## 2025-04-21 DIAGNOSIS — C34.32 ADENOCARCINOMA OF LOWER LOBE OF LEFT LUNG: ICD-10-CM

## 2025-04-21 DIAGNOSIS — C78.7 METASTATIC CANCER TO LIVER: ICD-10-CM

## 2025-04-21 LAB
ABSOLUTE EOSINOPHIL (OHS): 0.02 K/UL
ABSOLUTE MONOCYTE (OHS): 0.32 K/UL (ref 0.3–1)
ABSOLUTE NEUTROPHIL COUNT (OHS): 1.84 K/UL (ref 1.8–7.7)
ALBUMIN SERPL BCP-MCNC: 2.2 G/DL (ref 3.5–5.2)
ALP SERPL-CCNC: 68 UNIT/L (ref 40–150)
ALT SERPL W/O P-5'-P-CCNC: 16 UNIT/L (ref 10–44)
ANION GAP (OHS): 10 MMOL/L (ref 8–16)
AST SERPL-CCNC: 17 UNIT/L (ref 11–45)
BASOPHILS # BLD AUTO: 0.06 K/UL
BASOPHILS NFR BLD AUTO: 1.6 %
BILIRUB DIRECT SERPL-MCNC: 0.1 MG/DL (ref 0.1–0.3)
BILIRUB SERPL-MCNC: 0.3 MG/DL (ref 0.1–1)
BUN SERPL-MCNC: 8 MG/DL (ref 8–23)
CALCIUM SERPL-MCNC: 9.2 MG/DL (ref 8.7–10.5)
CHLORIDE SERPL-SCNC: 102 MMOL/L (ref 95–110)
CO2 SERPL-SCNC: 25 MMOL/L (ref 23–29)
CREAT SERPL-MCNC: 0.7 MG/DL (ref 0.5–1.4)
ERYTHROCYTE [DISTWIDTH] IN BLOOD BY AUTOMATED COUNT: 19.6 % (ref 11.5–14.5)
GFR SERPLBLD CREATININE-BSD FMLA CKD-EPI: >60 ML/MIN/1.73/M2
GLUCOSE SERPL-MCNC: 109 MG/DL (ref 70–110)
HCT VFR BLD AUTO: 27.2 % (ref 40–54)
HGB BLD-MCNC: 8 GM/DL (ref 14–18)
IMM GRANULOCYTES # BLD AUTO: 0.07 K/UL (ref 0–0.04)
IMM GRANULOCYTES NFR BLD AUTO: 1.9 % (ref 0–0.5)
INR PPP: 1.2 (ref 0.8–1.2)
LYMPHOCYTES # BLD AUTO: 1.37 K/UL (ref 1–4.8)
MCH RBC QN AUTO: 24.1 PG (ref 27–31)
MCHC RBC AUTO-ENTMCNC: 29.4 G/DL (ref 32–36)
MCV RBC AUTO: 82 FL (ref 82–98)
NUCLEATED RBC (/100WBC) (OHS): 0 /100 WBC
PLATELET # BLD AUTO: 232 K/UL (ref 150–450)
PMV BLD AUTO: 9.6 FL (ref 9.2–12.9)
POTASSIUM SERPL-SCNC: 3.3 MMOL/L (ref 3.5–5.1)
PROT SERPL-MCNC: 7.4 GM/DL (ref 6–8.4)
PROTHROMBIN TIME: 12.7 SECONDS (ref 9–12.5)
RBC # BLD AUTO: 3.32 M/UL (ref 4.6–6.2)
RELATIVE EOSINOPHIL (OHS): 0.5 %
RELATIVE LYMPHOCYTE (OHS): 37.2 % (ref 18–48)
RELATIVE MONOCYTE (OHS): 8.7 % (ref 4–15)
RELATIVE NEUTROPHIL (OHS): 50.1 % (ref 38–73)
SODIUM SERPL-SCNC: 137 MMOL/L (ref 136–145)
WBC # BLD AUTO: 3.68 K/UL (ref 3.9–12.7)

## 2025-04-21 PROCEDURE — 36415 COLL VENOUS BLD VENIPUNCTURE: CPT

## 2025-04-21 PROCEDURE — 85610 PROTHROMBIN TIME: CPT

## 2025-04-21 PROCEDURE — 85025 COMPLETE CBC W/AUTO DIFF WBC: CPT

## 2025-04-21 PROCEDURE — 82248 BILIRUBIN DIRECT: CPT

## 2025-04-21 PROCEDURE — 80053 COMPREHEN METABOLIC PANEL: CPT

## 2025-04-21 NOTE — PRE-PROCEDURE INSTRUCTIONS
PRE-OP INSTRUCTIONS:  Instructed patient to have no food,milk or milk products after   It is ok to take AM medications with a few sips of water   Medication instructions for pm prior to and am of surgery reviewed.  Instructed patient to avoid taking vitamins,supplements,aspirin or ibuprofen the am of surgery.  Shower instructions provided     Patient denies any side effects or issues with anesthesia or sedation.      Arrival/IR Dept -  [id number]

## 2025-04-22 ENCOUNTER — HOSPITAL ENCOUNTER (OUTPATIENT)
Dept: RADIOLOGY | Facility: HOSPITAL | Age: 70
Discharge: HOME | End: 2025-04-22
Payer: MEDICARE

## 2025-04-22 ENCOUNTER — HOSPITAL ENCOUNTER (OUTPATIENT)
Dept: INTERVENTIONAL RADIOLOGY/VASCULAR | Facility: HOSPITAL | Age: 70
Discharge: HOME | End: 2025-04-22
Payer: MEDICARE

## 2025-04-22 ENCOUNTER — ANESTHESIA (OUTPATIENT)
Dept: INTERVENTIONAL RADIOLOGY/VASCULAR | Facility: HOSPITAL | Age: 70
End: 2025-04-22
Payer: MEDICARE

## 2025-04-22 VITALS
TEMPERATURE: 98 F | HEIGHT: 71 IN | OXYGEN SATURATION: 98 % | DIASTOLIC BLOOD PRESSURE: 77 MMHG | HEART RATE: 102 BPM | SYSTOLIC BLOOD PRESSURE: 122 MMHG | BODY MASS INDEX: 23.24 KG/M2 | WEIGHT: 166 LBS | RESPIRATION RATE: 18 BRPM

## 2025-04-22 DIAGNOSIS — C34.32 ADENOCARCINOMA OF LOWER LOBE OF LEFT LUNG: ICD-10-CM

## 2025-04-22 DIAGNOSIS — C78.7 METASTATIC CANCER TO LIVER: ICD-10-CM

## 2025-04-22 PROCEDURE — 25000003 PHARM REV CODE 250

## 2025-04-22 PROCEDURE — 63600175 PHARM REV CODE 636 W HCPCS

## 2025-04-22 PROCEDURE — 25500020 PHARM REV CODE 255: Performed by: STUDENT IN AN ORGANIZED HEALTH CARE EDUCATION/TRAINING PROGRAM

## 2025-04-22 PROCEDURE — 78201 LIVER IMAGING STATIC ONLY: CPT | Mod: 26,59,, | Performed by: NUCLEAR MEDICINE

## 2025-04-22 PROCEDURE — 78830 RP LOCLZJ TUM SPECT W/CT 1: CPT | Mod: 26,,, | Performed by: NUCLEAR MEDICINE

## 2025-04-22 PROCEDURE — C1887 CATHETER, GUIDING: HCPCS

## 2025-04-22 PROCEDURE — 37000009 HC ANESTHESIA EA ADD 15 MINS

## 2025-04-22 PROCEDURE — 78201 LIVER IMAGING STATIC ONLY: CPT | Mod: TC

## 2025-04-22 PROCEDURE — 37000008 HC ANESTHESIA 1ST 15 MINUTES

## 2025-04-22 PROCEDURE — C1894 INTRO/SHEATH, NON-LASER: HCPCS

## 2025-04-22 PROCEDURE — 78830 RP LOCLZJ TUM SPECT W/CT 1: CPT | Mod: TC

## 2025-04-22 PROCEDURE — A9540 TC99M MAA: HCPCS

## 2025-04-22 PROCEDURE — C1769 GUIDE WIRE: HCPCS

## 2025-04-22 RX ORDER — SODIUM CHLORIDE 0.9 % (FLUSH) 0.9 %
10 SYRINGE (ML) INJECTION
Status: DISCONTINUED | OUTPATIENT
Start: 2025-04-22 | End: 2025-04-23 | Stop reason: HOSPADM

## 2025-04-22 RX ORDER — PROPOFOL 10 MG/ML
VIAL (ML) INTRAVENOUS
Status: DISCONTINUED | OUTPATIENT
Start: 2025-04-22 | End: 2025-04-22

## 2025-04-22 RX ORDER — PHENYLEPHRINE HYDROCHLORIDE 10 MG/ML
INJECTION INTRAVENOUS
Status: DISCONTINUED | OUTPATIENT
Start: 2025-04-22 | End: 2025-04-22

## 2025-04-22 RX ORDER — ONDANSETRON HYDROCHLORIDE 2 MG/ML
INJECTION, SOLUTION INTRAVENOUS
Status: DISCONTINUED | OUTPATIENT
Start: 2025-04-22 | End: 2025-04-22

## 2025-04-22 RX ORDER — GLUCAGON 1 MG
1 KIT INJECTION
Status: DISCONTINUED | OUTPATIENT
Start: 2025-04-22 | End: 2025-04-23 | Stop reason: HOSPADM

## 2025-04-22 RX ORDER — SODIUM CHLORIDE 9 MG/ML
INJECTION, SOLUTION INTRAVENOUS CONTINUOUS
Status: DISCONTINUED | OUTPATIENT
Start: 2025-04-22 | End: 2025-04-23 | Stop reason: HOSPADM

## 2025-04-22 RX ORDER — LIDOCAINE HYDROCHLORIDE 10 MG/ML
1 INJECTION, SOLUTION EPIDURAL; INFILTRATION; INTRACAUDAL; PERINEURAL ONCE AS NEEDED
Status: DISCONTINUED | OUTPATIENT
Start: 2025-04-22 | End: 2025-04-23 | Stop reason: HOSPADM

## 2025-04-22 RX ORDER — SUCCINYLCHOLINE CHLORIDE 20 MG/ML
INJECTION INTRAMUSCULAR; INTRAVENOUS
Status: DISCONTINUED | OUTPATIENT
Start: 2025-04-22 | End: 2025-04-22

## 2025-04-22 RX ORDER — ACETAMINOPHEN 10 MG/ML
1000 INJECTION, SOLUTION INTRAVENOUS ONCE
Status: DISCONTINUED | OUTPATIENT
Start: 2025-04-22 | End: 2025-04-23 | Stop reason: HOSPADM

## 2025-04-22 RX ORDER — LIDOCAINE HYDROCHLORIDE 20 MG/ML
INJECTION INTRAVENOUS
Status: DISCONTINUED | OUTPATIENT
Start: 2025-04-22 | End: 2025-04-22

## 2025-04-22 RX ORDER — DEXAMETHASONE SODIUM PHOSPHATE 4 MG/ML
INJECTION, SOLUTION INTRA-ARTICULAR; INTRALESIONAL; INTRAMUSCULAR; INTRAVENOUS; SOFT TISSUE
Status: DISCONTINUED | OUTPATIENT
Start: 2025-04-22 | End: 2025-04-22

## 2025-04-22 RX ORDER — DEXMEDETOMIDINE HYDROCHLORIDE 100 UG/ML
INJECTION, SOLUTION INTRAVENOUS
Status: DISCONTINUED | OUTPATIENT
Start: 2025-04-22 | End: 2025-04-22

## 2025-04-22 RX ORDER — FENTANYL CITRATE 50 UG/ML
INJECTION, SOLUTION INTRAMUSCULAR; INTRAVENOUS
Status: DISCONTINUED | OUTPATIENT
Start: 2025-04-22 | End: 2025-04-22

## 2025-04-22 RX ORDER — OXYCODONE HYDROCHLORIDE 5 MG/1
5 TABLET ORAL EVERY 4 HOURS PRN
Refills: 0 | Status: DISCONTINUED | OUTPATIENT
Start: 2025-04-22 | End: 2025-04-23 | Stop reason: HOSPADM

## 2025-04-22 RX ORDER — ROCURONIUM BROMIDE 10 MG/ML
INJECTION, SOLUTION INTRAVENOUS
Status: DISCONTINUED | OUTPATIENT
Start: 2025-04-22 | End: 2025-04-22

## 2025-04-22 RX ORDER — HALOPERIDOL LACTATE 5 MG/ML
0.5 INJECTION, SOLUTION INTRAMUSCULAR EVERY 10 MIN PRN
Status: DISCONTINUED | OUTPATIENT
Start: 2025-04-22 | End: 2025-04-23 | Stop reason: HOSPADM

## 2025-04-22 RX ADMIN — PHENYLEPHRINE HYDROCHLORIDE 100 MCG: 10 INJECTION INTRAVENOUS at 10:04

## 2025-04-22 RX ADMIN — SODIUM CHLORIDE: 0.9 INJECTION, SOLUTION INTRAVENOUS at 10:04

## 2025-04-22 RX ADMIN — PHENYLEPHRINE HYDROCHLORIDE 0.4 MCG/KG/MIN: 10 INJECTION INTRAVENOUS at 10:04

## 2025-04-22 RX ADMIN — SUGAMMADEX 200 MG: 100 INJECTION, SOLUTION INTRAVENOUS at 11:04

## 2025-04-22 RX ADMIN — DEXMEDETOMIDINE 12 MCG: 200 INJECTION, SOLUTION INTRAVENOUS at 10:04

## 2025-04-22 RX ADMIN — ROCURONIUM BROMIDE 30 MG: 10 INJECTION INTRAVENOUS at 10:04

## 2025-04-22 RX ADMIN — DEXAMETHASONE SODIUM PHOSPHATE 4 MG: 4 INJECTION, SOLUTION INTRAMUSCULAR; INTRAVENOUS at 10:04

## 2025-04-22 RX ADMIN — DEXMEDETOMIDINE 4 MCG: 200 INJECTION, SOLUTION INTRAVENOUS at 11:04

## 2025-04-22 RX ADMIN — FENTANYL CITRATE 50 MCG: 50 INJECTION, SOLUTION INTRAMUSCULAR; INTRAVENOUS at 10:04

## 2025-04-22 RX ADMIN — PROPOFOL 150 MG: 10 INJECTION, EMULSION INTRAVENOUS at 10:04

## 2025-04-22 RX ADMIN — ONDANSETRON 4 MG: 2 INJECTION INTRAMUSCULAR; INTRAVENOUS at 11:04

## 2025-04-22 RX ADMIN — KIT FOR THE PREPARATION OF TECHNETIUM TC 99M ALBUMIN AGGREGATED 5.9 MILLICURIE: 2 INJECTION, POWDER, LYOPHILIZED, FOR SUSPENSION INTRAPERITONEAL; INTRAVENOUS at 01:04

## 2025-04-22 RX ADMIN — SUCCINYLCHOLINE CHLORIDE 100 MG: 20 INJECTION, SOLUTION INTRAMUSCULAR; INTRAVENOUS at 10:04

## 2025-04-22 RX ADMIN — PROPOFOL 20 MG: 10 INJECTION, EMULSION INTRAVENOUS at 11:04

## 2025-04-22 RX ADMIN — IOHEXOL 90 ML: 300 INJECTION, SOLUTION INTRAVENOUS at 11:04

## 2025-04-22 RX ADMIN — ROCURONIUM BROMIDE 20 MG: 10 INJECTION INTRAVENOUS at 10:04

## 2025-04-22 RX ADMIN — PROPOFOL 50 MG: 10 INJECTION, EMULSION INTRAVENOUS at 11:04

## 2025-04-22 RX ADMIN — LIDOCAINE HYDROCHLORIDE 100 MG: 20 INJECTION INTRAVENOUS at 10:04

## 2025-04-22 NOTE — ANESTHESIA PREPROCEDURE EVALUATION
04/22/2025  Ernie Walton is a 69 y.o., male pmh significant for systemic sclerosis/scleroderma (dx'd 2020, on mycophenolate mofetil, prednisone, and nintendinab), HTN, HLD, BPH, h/o nephrolithiasis w/ recurrent UTI, and DDD who presents for f/u of the below lung cancer. Now presenting for  IR EMBOLIZATION COMP FOR TUMOR_ORGAN ISCHEMIA_INFARC     Problem List[1]  Current Outpatient Medications   Medication Instructions    amLODIPine (NORVASC) 5 MG tablet 1 tablet, Daily    atorvastatin (LIPITOR) 40 MG tablet 1 tablet, Daily    benzonatate (TESSALON) 100 mg, Oral, 3 times daily PRN    chlorhexidine (PERIDEX) 0.12 % solution RINSE MOUTH WITH 15 ML ONCE DAILY IN THE EVENING    cyanocobalamin 1,000 mcg, Every 30 days    diphenhydrAMINE (BENADRYL) 50 MG capsule Take 50mg by mouth 1 hour before contrast administration.    diphenhydrAMINE-aluminum-magnesium hydroxide-simethicone-LIDOcaine viscous HCl 2% 15 mLs, Swish & Spit, Every 4 hours PRN    DULoxetine (CYMBALTA) 60 mg, Oral, Daily    ferrous sulfate (FEOSOL) 325 mg, with breakfast    fluticasone propionate (FLONASE) 50 mcg/actuation nasal spray 1 spray, Daily    folic acid (bulk) 666 mcg    ibuprofen (ADVIL,MOTRIN) 800 mg, Oral, Every 8 hours PRN    Lactobacillus rhamnosus GG (CULTURELLE) 10 billion cell capsule 1 capsule, Every morning    LIDOcaine (LIDODERM) 5 % 1 patch, Transdermal, Daily, Remove & Discard patch within 12 hours or as directed by MD    LIDOcaine 5 % Crea SMARTSIG:Sparingly Topical 3 Times Daily    LIDOcaine viscous HCl 2% (XYLOCAINE) 2 % Soln     lisinopriL 10 MG tablet 2 tablets, Daily    magic mouthwash diphen/antac/lidoc 15 mLs, Swish & Spit, Every 4 hours PRN    mupirocin (BACTROBAN) 2 % ointment 1 Application    MYCOPHENOLATE SODIUM ORAL 500 mg, Oral, 2 times daily    nintedanib (OFEV) 100 mg, 2 times daily    omega 3-dha-epa-fish oil  1,000 mg (120 mg-180 mg) Cap 1,000 mg    ondansetron (ZOFRAN-ODT) 4 mg    pantoprazole (PROTONIX) 40 mg    phenazopyridine (PYRIDIUM) 200 mg, 3 times daily PRN    predniSONE (DELTASONE) 50 MG Tab Take 50mg by mouth at 13 hours, 7 hours, and 1 hour before contrast administration.    predniSONE, bulk, Powd 7.5 mg    pregabalin (LYRICA) 75 mg, 2 times daily    raloxifene (EVISTA) 60 mg tablet 1 tablet, Daily    sildenafil (REVATIO) 20 mg Tab 1 tablet, 3 times daily    tamsulosin (FLOMAX) 0.4 mg Cap 1 capsule, Daily    triamcinolone acetonide 0.1% (KENALOG) 0.1 % ointment APPLY OINTMENT TOPICALLY TO AFFECTED AREA TWICE DAILY FOR 30 DAYS, FOR LEFT RING FINGER.    vitamin D (VITAMIN D3) 1000 units Tab Daily    vitamin E, dl, acetate, 90 mg (200 unit) Cap 1 capsule, Every morning    vitamin E 180 Units     Pertinent Cardiac Studies:  Transthoracic Echo Left Heart Catheterization   No results found for this or any previous visit.   No results found for this or any previous visit.       Previous airway:  Placement Date: 05/15/24; Placement Time: 1220; View: D.L.: Grade I View; Attempts: 1st attempt; Size (mm): 8 mm; Secured at (cm): 20 cm; Airway Device: Video laryngoscope; Cuffed, ETT passed easily without resistance.; Vocal Cords ID: Yes; BBS=/ETCO2(+): Yes; Atraumatic Laryngoscopy: Yes; Dentition Intact: Yes; Removal Date: 05/15/24; Removal Time: 1311     Pre-op Assessment    I have reviewed the Patient Summary Reports.     I have reviewed the Nursing Notes. I have reviewed the NPO Status.   I have reviewed the Medications.     Review of Systems  Anesthesia Hx:               Denies Personal Hx of Anesthesia complications.                    Hematology/Oncology:       -- Anemia:                  Current/Recent Cancer. (stage IV adeno Ca of lung)         chemotherapy and radiation       Cardiovascular:     Hypertension           hyperlipidemia                               Renal/:   renal calculi BPH               Musculoskeletal:     scleroderma       Spine Disorders:  Degenerative disease               Physical Exam  General: Well nourished    Airway:  Mallampati: III   Mouth Opening: < 3 cm  TM Distance: < 4 cm  Tongue: Normal  Neck ROM: Flexion Decreased, Extension Decreased    Dental:  Intact  No active dental issues      Anesthesia Plan  Type of Anesthesia, risks & benefits discussed:    Anesthesia Type: Gen ETT  Intra-op Monitoring Plan: Standard ASA Monitors  Post Op Pain Control Plan: multimodal analgesia and IV/PO Opioids PRN  Induction:  IV  Airway Plan: Video and Fiberoptic, Post-Induction  Informed Consent: Informed consent signed with the Patient and all parties understand the risks and agree with anesthesia plan.  All questions answered.   ASA Score: 3  Day of Surgery Review of History & Physical: H&P Update referred to the surgeon/provider.    Ready For Surgery From Anesthesia Perspective.     .           [1]   Patient Active Problem List  Diagnosis    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    Metastasis to bone

## 2025-04-22 NOTE — DISCHARGE INSTRUCTIONS
"Please call with any questions or concerns.    Monday through Friday 8:00 am - 4:30 pm  Interventional Radiology Clinic  [phone]    Urgent concerns after hours/weekends  Ask the  for the "Interventional Radiologist on call"  [phone]     Post-Yttrium (Y90) instructions:    Dont drive for 3 days.  Avoid walking, bending, and taking stairs for 24 hours.  No heavy lifting (gallon of milk) or strenuous exercise for 10 days.  Keep small children, pregnant women, and pets 3 feet away for 3 days.  Keep your dressing clean and dry for 24 hours. Do not submerge insertion site in water (bath tub, swimming pool) until fully healed.  Be sure to follow any other instructions from your doctor.    Call your doctor if you have any of the following:    Fever of 100.4 (38C) or higher, severe nausea and/or vomitting   Bleeding, swelling, or a large lump at the insertion site  Sharp or increasing pain at the insertion site  Leg pain, numbness, or a cold leg or foot  Any other symptoms your provider instructed you to report based on your medical condition.         "

## 2025-04-22 NOTE — SEDATION DOCUMENTATION
Anesthesia will be caring for the patient by administering medications, monitoring pt, and securing the airway. See anesthesia record for details.

## 2025-04-22 NOTE — H&P
Radiology History & Physical      SUBJECTIVE:     Chief Complaint: liver cancer    History of Present Illness:  Ernie Walton is a 69 y.o. male with lung adenocarcinoma with a new lesion in the right hepatic lobe concerning for metastatic disease. He presents for pre Y90 mapping.    Past Medical History:   Diagnosis Date    Bone cancer     DDD (degenerative disc disease), lumbosacral     Liver cancer     Low back pain     Lung cancer     Scleroderma      Past Surgical History:   Procedure Laterality Date    LITHOTRIPSY      LUNG BIOPSY         Home Meds:   Prior to Admission medications    Medication Sig Start Date End Date Taking? Authorizing Provider   benzonatate (TESSALON) 100 MG capsule Take 1 capsule (100 mg total) by mouth 3 (three) times daily as needed for Cough. 4/8/25 7/7/25 Yes Raul Lagunas MD   chlorhexidine (PERIDEX) 0.12 % solution RINSE MOUTH WITH 15 ML ONCE DAILY IN THE EVENING 2/20/25  Yes Provider, Historical   DULoxetine (CYMBALTA) 60 MG capsule Take 1 capsule (60 mg total) by mouth once daily. for 120 doses 4/7/25 8/5/25 Yes Rudi Granados IV, MD   ferrous sulfate (FEOSOL) 325 mg (65 mg iron) Tab tablet Take 325 mg by mouth with breakfast.   Yes Provider, Historical   fluticasone propionate (FLONASE) 50 mcg/actuation nasal spray 1 spray by Each Nostril route once daily.   Yes Provider, Historical   folic acid, bulk, 100 % Powd Take 666 mcg by mouth.   Yes Provider, Historical   ibuprofen (ADVIL,MOTRIN) 800 MG tablet Take 1 tablet (800 mg total) by mouth every 8 (eight) hours as needed for Pain. 4/7/25 4/7/26 Yes Rudi Granados IV, MD   MYCOPHENOLATE SODIUM ORAL Take 500 mg by mouth 2 (two) times a day.   Yes Provider, Historical   omega 3-dha-epa-fish oil 1,000 mg (120 mg-180 mg) Cap Take 1,000 mg by mouth.   Yes Provider, Historical   pantoprazole (PROTONIX) 40 MG tablet Take 40 mg by mouth.   Yes Provider, Historical   predniSONE, bulk, Powd Take 7.5 mg by mouth.   Yes Provider,  Historical   raloxifene (EVISTA) 60 mg tablet Take 1 tablet by mouth once daily.   Yes Provider, Historical   sildenafil (REVATIO) 20 mg Tab Take 1 tablet by mouth 3 (three) times daily.   Yes Provider, Historical   tamsulosin (FLOMAX) 0.4 mg Cap Take 1 capsule by mouth once daily.   Yes Provider, Historical   vitamin D (VITAMIN D3) 1000 units Tab Take by mouth once daily.   Yes Provider, Historical   vitamin E 100 unit/0.25 mL Drop Take 180 Units by mouth.   Yes Provider, Historical   amLODIPine (NORVASC) 5 MG tablet Take 1 tablet by mouth once daily.  Patient not taking: Reported on 4/21/2025 11/21/23   Provider, Historical   atorvastatin (LIPITOR) 40 MG tablet Take 1 tablet by mouth once daily.  Patient not taking: Reported on 4/21/2025    Provider, Historical   cyanocobalamin 1,000 mcg/mL injection Inject 1,000 mcg into the muscle every 30 days. 12/18/24   Provider, Historical   diphenhydrAMINE (BENADRYL) 50 MG capsule Take 50mg by mouth 1 hour before contrast administration. 2/4/25   Rudi Granados IV, MD   diphenhydrAMINE-aluminum-magnesium hydroxide-simethicone-LIDOcaine viscous HCl 2% Swish and spit 15 mLs every 4 (four) hours as needed.  Patient not taking: Reported on 4/21/2025 1/2/25   Kate Garzon NP   Lactobacillus rhamnosus GG (CULTURELLE) 10 billion cell capsule Take 1 capsule by mouth every morning.    Provider, Historical   LIDOcaine (LIDODERM) 5 % Place 1 patch onto the skin once daily. Remove & Discard patch within 12 hours or as directed by MD 2/25/25 2/25/26  Rudi Granados IV, MD   LIDOcaine 5 % Crea SMARTSIG:Sparingly Topical 3 Times Daily 10/30/24   Provider, Historical   LIDOcaine viscous HCl 2% (XYLOCAINE) 2 % Soln  1/2/25   Provider, Historical   lisinopriL 10 MG tablet Take 2 tablets by mouth once daily.    Provider, Historical   magic mouthwash diphen/antac/lidoc Swish and spit 15 mLs every 4 (four) hours as needed. 1/2/25   Kate Garzon NP   mupirocin (BACTROBAN) 2 %  ointment Apply 1 Application topically.    Provider, Historical   nintedanib (OFEV) 100 mg Cap Take 100 mg by mouth 2 (two) times daily.    Provider, Historical   ondansetron (ZOFRAN-ODT) 4 MG TbDL Take 4 mg by mouth. 10/30/24   Provider, Historical   phenazopyridine (PYRIDIUM) 200 MG tablet Take 200 mg by mouth 3 (three) times daily as needed.    Provider, Historical   predniSONE (DELTASONE) 50 MG Tab Take 50mg by mouth at 13 hours, 7 hours, and 1 hour before contrast administration. 2/4/25   Rudi Granados IV, MD   pregabalin (LYRICA) 75 MG capsule Take 75 mg by mouth 2 (two) times daily. 10/30/24   Provider, Historical   triamcinolone acetonide 0.1% (KENALOG) 0.1 % ointment APPLY OINTMENT TOPICALLY TO AFFECTED AREA TWICE DAILY FOR 30 DAYS, FOR LEFT RING FINGER. 7/16/24   Provider, Historical   vitamin E, dl, acetate, 90 mg (200 unit) Cap Take 1 capsule by mouth every morning.  Patient not taking: Reported on 4/21/2025    Provider, Historical     Anticoagulants/Antiplatelets: no anticoagulation    Allergies:   Review of patient's allergies indicates:   Allergen Reactions    Milk containing products (dairy) Nausea And Vomiting     Per pt milked when used with cooking causes pt to vomit     Sedation History:  no adverse reactions    Review of Systems:   Hematological: no known coagulopathies  Respiratory: no shortness of breath  Cardiovascular: no chest pain  Gastrointestinal: no abdominal pain  Genito-Urinary: no dysuria  Musculoskeletal: negative  Neurological: no TIA or stroke symptoms         OBJECTIVE:     Vital Signs (Most Recent)  Temp: 99.3 °F (37.4 °C) (04/22/25 0907)  Pulse: (!) 123 (04/22/25 0907)  Resp: 18 (04/22/25 0907)  BP: 121/73 (04/22/25 0908)  SpO2: 98 % (04/22/25 0907)    Physical Exam:  ASA: per anesthesia  Mallampati: per anesthesia    General: no acute distress  Mental Status: alert and oriented to person, place and time  HEENT: normocephalic, atraumatic  Chest: unlabored breathing  Heart:  regular heart rate  Abdomen: nondistended  Extremity: moves all extremities    Laboratory  Lab Results   Component Value Date    INR 1.2 04/21/2025       Lab Results   Component Value Date    WBC 3.68 (L) 04/21/2025    HGB 8.0 (L) 04/21/2025    HCT 27.2 (L) 04/21/2025    MCV 82 04/21/2025     04/21/2025      Lab Results   Component Value Date     (H) 03/18/2025     04/21/2025    K 3.3 (L) 04/21/2025     04/21/2025    CO2 25 04/21/2025    BUN 8 04/21/2025    CREATININE 0.7 04/21/2025    CALCIUM 9.2 04/21/2025    MG 1.4 (L) 03/18/2025    ALT 16 04/21/2025    AST 17 04/21/2025    ALBUMIN 2.2 (L) 04/21/2025    BILITOT 0.3 04/21/2025    BILIDIR 0.1 04/21/2025       ASSESSMENT/PLAN:     Sedation Plan: per anesthesia  Patient will undergo pre Y90 mapping.    Written consent was obtained from the patient. All questions answered.    Konstantin Bella MD  Radiology PGY-5  [facility]

## 2025-04-22 NOTE — TRANSFER OF CARE
"Anesthesia Transfer of Care Note    Patient: Ernie Walton    Procedure(s) Performed: * No procedures listed *    Patient location: [facility]    Anesthesia Type: general    Transport from OR: Transported from OR on 6-10 L/min O2 by face mask with adequate spontaneous ventilation    Post pain: adequate analgesia    Post assessment: no apparent anesthetic complications    Post vital signs: stable    Level of consciousness: awake, alert and oriented    Nausea/Vomiting: no nausea/vomiting    Complications: none    Transfer of care protocol was followed      Last vitals: Visit Vitals  /64 (BP Location: Right arm)   Pulse 97   Temp 37.4 °C (99.3 °F) (Temporal)   Resp 18   Ht 5' 11" (1.803 m)   Wt 75.3 kg (166 lb)   SpO2 100%   BMI 23.15 kg/m²     "

## 2025-04-22 NOTE — SEDATION DOCUMENTATION
Pre Y-90 mapping procedure completed. Patient tolerated well. No distress noted, respirations even and unlabored, anesthesia will continue to monitor. VSS. Vascade closure device deployed in RIGHT femoral artery; hemostasis achieved at 1124. Patient to lay flat for 2 hours until 1324 on 04/22/2025 per MD. RIGHT groin site clean, dry, and intact; no bleeding or hematoma noted. Patient to be transferred to [facility] for post-procedural recovery per MD. Report to be given at bedside to RN.

## 2025-04-22 NOTE — NURSING
Pt and spouse given discharge instructions and all questions addressed.  Pt and spouse advised to call the clinic and or hospital with any other questions or concerns.

## 2025-04-22 NOTE — PROCEDURES
Radiology Post-Procedure Note    Pre Op Diagnosis: Metastatic lung cancer  Post Op Diagnosis: Same    Procedure: Y90 mapping    Procedure performed by: Girish Larose MD    Written Informed Consent Obtained: Yes  Specimen Removed: NO  Estimated Blood Loss: Minimal    Findings:   Successful Y90 mapping.    Patient tolerated procedure well.    Girish Larose MD  Interventional Radiologist  Department of Radiology

## 2025-04-22 NOTE — DISCHARGE SUMMARY
Radiology Discharge Summary      Hospital Course: No complications    Admit Date: 4/22/2025  Discharge Date: 04/22/2025     Instructions Given to Patient: Yes  Diet: Resume prior diet  Activity: activity as tolerated and no driving for today    Description of Condition on Discharge: Stable  Vital Signs (Most Recent): Temp: 98.3 °F (36.8 °C) (04/22/25 1353)  Pulse: 102 (04/22/25 1353)  Resp: 18 (04/22/25 1353)  BP: 122/77 (04/22/25 1353)  SpO2: 98 % (04/22/25 1353)    Discharge Disposition: Home    Discharge Diagnosis: Metastatic lung cancer s/p Y90 mapping     Follow-up: Will plan for Y90 following LSF calculation.    Girish Larose MD  Interventional Radiologist  Department of Radiology

## 2025-04-22 NOTE — ANESTHESIA PROCEDURE NOTES
Intubation    Date/Time: 4/22/2025 10:15 AM    Performed by: Leisa Martin CRNA  Authorized by: Betzaida Moreno MD    Intubation:     Induction:  Intravenous    Intubated:  Postinduction    Mask Ventilation:  Easy with oral airway    Attempts:  1    Attempted By:  CRNA    Method of Intubation:  Fiberoptic    Laryngeal View Grade: Grade I - full view of cords      Difficult Airway Encountered?: No      Complications:  None    Airway Device:  Oral endotracheal tube    Airway Device Size:  7.5    Style/Cuff Inflation:  Cuffed (inflated to minimal occlusive pressure)    Tube secured:  23    Secured at:  The teeth    Placement Verified By:  Capnometry and Fiber optic visualization    Complicating Factors:  Small mouth, retrognathia and poor neck/head extension    Findings Post-Intubation:  BS equal bilateral and atraumatic/condition of teeth unchanged

## 2025-04-23 NOTE — ANESTHESIA POSTPROCEDURE EVALUATION
Anesthesia Post Evaluation    Patient: Ernie Walton    Procedure(s) Performed: * No procedures listed *    Final Anesthesia Type: general      Patient location during evaluation: PACU  Patient participation: Yes- Able to Participate  Level of consciousness: awake and alert  Post-procedure vital signs: reviewed and stable  Pain management: adequate  Airway patency: patent    PONV status at discharge: No PONV  Anesthetic complications: no      Cardiovascular status: hemodynamically stable  Respiratory status: unassisted and spontaneous ventilation  Hydration status: euvolemic  Follow-up not needed.              Vitals Value Taken Time   /77 04/22/25 13:53   Temp 36.8 °C (98.3 °F) 04/22/25 13:53   Pulse 102 04/22/25 13:53   Resp 18 04/22/25 13:53   SpO2 98 % 04/22/25 13:53         No case tracking events are documented in the log.      Pain/Nanda Score: Nanda Score: 10 (4/22/2025  1:00 PM)

## 2025-05-06 ENCOUNTER — TELEPHONE (OUTPATIENT)
Dept: HEMATOLOGY/ONCOLOGY | Facility: CLINIC | Age: 70
End: 2025-05-06

## 2025-05-06 ENCOUNTER — OFFICE VISIT (OUTPATIENT)
Dept: HEMATOLOGY/ONCOLOGY | Facility: CLINIC | Age: 70
End: 2025-05-06
Payer: MEDICARE

## 2025-05-06 ENCOUNTER — PATIENT MESSAGE (OUTPATIENT)
Dept: HEMATOLOGY/ONCOLOGY | Facility: CLINIC | Age: 70
End: 2025-05-06

## 2025-05-06 ENCOUNTER — LAB VISIT (OUTPATIENT)
Dept: LAB | Facility: HOSPITAL | Age: 70
End: 2025-05-06
Attending: HOSPITALIST
Payer: MEDICARE

## 2025-05-06 VITALS
HEART RATE: 147 BPM | DIASTOLIC BLOOD PRESSURE: 72 MMHG | BODY MASS INDEX: 23.14 KG/M2 | SYSTOLIC BLOOD PRESSURE: 108 MMHG | HEIGHT: 71 IN | WEIGHT: 165.31 LBS | TEMPERATURE: 98 F | OXYGEN SATURATION: 89 %

## 2025-05-06 DIAGNOSIS — C78.7 SECONDARY MALIGNANT NEOPLASM OF LIVER: ICD-10-CM

## 2025-05-06 DIAGNOSIS — C34.32 ADENOCARCINOMA OF LOWER LOBE OF LEFT LUNG: Primary | ICD-10-CM

## 2025-05-06 DIAGNOSIS — C34.32 ADENOCARCINOMA OF LOWER LOBE OF LEFT LUNG: ICD-10-CM

## 2025-05-06 DIAGNOSIS — G62.0 CHEMOTHERAPY-INDUCED PERIPHERAL NEUROPATHY: ICD-10-CM

## 2025-05-06 DIAGNOSIS — G89.3 CANCER-RELATED PAIN: ICD-10-CM

## 2025-05-06 DIAGNOSIS — M34.9 SCLERODERMA: ICD-10-CM

## 2025-05-06 DIAGNOSIS — C79.51 SECONDARY MALIGNANT NEOPLASM OF BONE: ICD-10-CM

## 2025-05-06 DIAGNOSIS — C77.1 SECONDARY MALIGNANT NEOPLASM OF INTRATHORACIC LYMPH NODES: ICD-10-CM

## 2025-05-06 DIAGNOSIS — T45.1X5A CHEMOTHERAPY-INDUCED PERIPHERAL NEUROPATHY: ICD-10-CM

## 2025-05-06 PROCEDURE — 99215 OFFICE O/P EST HI 40 MIN: CPT | Mod: S$PBB,,, | Performed by: HOSPITALIST

## 2025-05-06 PROCEDURE — 99999 PR PBB SHADOW E&M-EST. PATIENT-LVL V: CPT | Mod: PBBFAC,,, | Performed by: HOSPITALIST

## 2025-05-06 PROCEDURE — G2211 COMPLEX E/M VISIT ADD ON: HCPCS | Mod: S$PBB,,, | Performed by: HOSPITALIST

## 2025-05-06 PROCEDURE — 99215 OFFICE O/P EST HI 40 MIN: CPT | Mod: PBBFAC | Performed by: HOSPITALIST

## 2025-05-06 RX ORDER — AMOXICILLIN 500 MG/1
CAPSULE ORAL
COMMUNITY
Start: 2025-04-29

## 2025-05-06 NOTE — TELEPHONE ENCOUNTER
Attempted to call patient  for availabilities to schedule palliative appt and there was no answer. Voicemail was left for patient to contact the clinic at their earliest convenience. Also sent a [facility] msg.

## 2025-05-06 NOTE — TELEPHONE ENCOUNTER
----- Message from Rudi Granados MD sent at 5/6/2025  9:09 AM CDT -----  Can we please fast track this patient into palliative care? Thanks!

## 2025-05-06 NOTE — PROGRESS NOTES
[facility] MEDICAL ONCOLOGY - FOLLOW UP VISIT    Reason for visit: Second opinion, Stage IV Adenocarcinoma of the Lung      Oncology History   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   5/15/2024 Imaging Significant Findings    CT C, Non-Con (f/u after imaging for scleroderma clinical trial workup)  - 2.5 x 2.4 x 1.8 cm LLL nodule (previously 2.2 x 2.2 x 1.8 cm, 3/25/24)  - Patchy ground-glass and nodular opacities adjacent to LLL nodule  - 1.4 cm left hilar lymph node  - Right lower paratracheal lymphadenopathy  - Lytic lesion with erosive changes in T2 vertebral body, new lytic lesions in manubrium, right fourth rib, T3, T7, and T11 vertebral bodies with pathologic fracture of superior endplate of T3 vertebral body     5/15/2024 Procedure    Bronch w/ EBUS  LLL TBBx  - Adenocarcinoma (Positive: TTF-1; Negative: p40)    LLL, Core Biopsy  - Adenocarcinoma    LN  - Positive: Station 11L  - Negative: Station 4R, 7, 4L    Caris NGS / PD-L1      - KEAP1, TP53, STK11 mutated  - ERBB2 negative / 1+  - TMB 7  - TORIE       6/5/2024 Imaging Significant Findings    PET CT  - 2.2 cm LLL mass, SUV 4.8  - 1.6 cm left hilar node, SUV 6.8  - 1.3 cm right lower paratracheal node, SUV 2.2  - Hypermetabolic lytic lesions in multiple bones (C7 transverse process, multiple thoracic, lumbar, and sacral segments, sternum, right scapular body, left scapular angle, several ribs, left ilium, left pubic body, bilateral ischia)     7/5/2024 Imaging Significant Findings    MRI Brain  - LANNY in brain or dura  - Mild enhancement within clivus, cannot rule out bony met     7/8/2024 Initial Diagnosis    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     7/8/2024 Cancer Staged    Staging form: Lung, AJCC 8th Edition  - Clinical: Stage IVB (cT2, cN1, pM1c)     8/9/2024 Imaging Significant Findings    MRI C/T/L Spine  Diffuse osseous metastasis throughout the visualized spine without any evidence of significant  compression fracture or retropulsion with involvement of the vertebral bodies and posterior elements at multiple levels.   No evidence of cervical/thoracic spinal cord compression with impingement of the ventral aspect of the cord from adjacent disc disease at multiple levels as discussed above.   Discal disease with foraminal stenosis at multiple levels throughout the spine with most prominent involvement seen at L4-5 and L5-S1 levels mostly degenerative in nature.   No abnormal intraspinal enhancement with no epidural enhancement noted        9/27/2024 Imaging Significant Findings    PET CT (compared against PET CT from 6/5/24)  1.4 x 1.0 cm LLL nodule (previously 2.0 x 1.3 cm), SUV 6.8 (similar)  New fairly defined opacity adjacent to the primary mass measuring up 1.8. The activity overlying the primary tumor is now confluent with the new opacity, leading to increased extent of activity.   1.2 x 2.0 cm L hilar LN (previously 1.4 x 2.2 cm), SUV 5.2  Numerous FDG-avid bone metastases are noted. Although previously visualized lesions show some areas of improvement, there are multiple new active bone metastases (L4, T8). The pattern suggests initial response to treatment with subsequent progressive disease      11/25/2024 Imaging Significant Findings    11/25/24 -- CT C/A/P  2.9 x 2.0 cm spiculated LLL pulmonary nodule  2.2 x 1.9 cm left hilar lymph node  Dependent pulmonary infiltrates  Extensive osseous metastatic disease (scattered lytic and sclerotic lesions throughout thoracic spine, manubrium, left scapula, pelvis, sacrum, and lumbar spine)     12/23/2024 -  Chemotherapy    Docetaxel/Ramucirumab  21 day cycle  Docetaxel 75 mg/m2  Ramucirumab 10 mg/kg     2/24/2025 Imaging Significant Findings    CT C/A/P  Stable 2.6 x 2.6 LLL nodule, previously 2.5 x 2.7 cm  Stable 1.8 x 1.5 cm left hilar lymph node, previously 2.0 x 1.5 cm  New 1.1 x 0.9 cm right hepatic lobe lesion  Stable scattered areas of osteo sclerotic  "disease     3/5/2025 Imaging Significant Findings    MRI Abdomen  1.2 cm right hepatic dome lesion consistent with metastatic disease, no additional liver lesions        Previous Workup:   - 6/5/24:  Medical oncology (Dr. Erik Clay), discussed carboplatin/paclitaxel, plan to proceed  - 6/5/24: C1D1 carboplatin/paclitaxel  - 6/26/24: C2D1 carboplatin/paclitaxel    Oncology History    HPI:     Ernie Walton is a 69 y.o. male with pmh significant for systemic sclerosis/scleroderma (dx'd 2020, on mycophenolate mofetil, prednisone, and nintendinab), HTN, HLD, BPH, h/o nephrolithiasis w/ recurrent UTI, and DDD who presents for f/u of the below lung cancer. His primary oncologist has been Dr. Erik Clay, and he has established care with Dr. Holley ([facility]).    1)  Stage IVB ([id number]) adenocarcinoma of the LLL (PD-L1 TPS 1%, no targetable mutations, STK11/TP53/KEAP1 co-mutations) w/ diffuse osseous disease , initially dx'd 5/15/24, s/p carboplatin/paclitaxel (6/5/24 - 9/17/24), radiographic evidence of progression in the left lung and skeleton, currently on 2L docetaxel/ramucirumab (12/23/24-present;  starting C5 for mucositis, fatigue, anorexia)    Last clinic 4/15/25, pending Y90 ablation of liver lesion with mapping scheduled 4/22/25, okay to proceed with docetaxel/ramucirumab in interim though may hold subsequent dose prior to actual procedure.  Proceed with C6 docetaxel/ramucirumab, C7 on 5/6/25.  Patient to meet with dentist on 4/23/25    Interval History:  4/15/25: C6 docetaxel/ramucirumab  4/22/25: Mapping for Y90 procedure  5/1/25:  Rheumatology, RTC in 3 months    The pt states "as far as I know, it was fine" when asked about how he tolerated his last infusion. That said, when asked further, he says that he has had a diminished appetite for the past 2-3 weeks. He confirms that this is worse than with previous cycles. He feels that it has been progressive over that time. He describes two episodes of vomiting in " "the past two days, the first after a cough drop, and the second after using a prescription mouth wash.     He describes "extreme" shortness of breath. He confirms this is worse than at our last visit, and he states that he newly needs a wheelchair today because of the SOB. He states that this has really been since 5/3/25, but he states that his breathing was relatively baseline prior to this weekend.     He denies fevers or chills, but his sister state's "you're cold all the time". He describes rhinorrhea with vomiting, but otherwise denies rhinorrhea. He denies a new or worsening cough (states it is at baseline, which is still bothersome to him). He denies any dysuria, though says that he peed every two hours last night.     He describes pain in his L arm to keep that shoulder from hurting. He also states that he is having pain over his R side (he indicates just above the hip). He says "I guess so" when asked if these pains have been worsening as well.     He confirms that he met with rheumatology and states that he wants more tests to be drawn with his treatment labs, but did not bring the paper with these written on them. Per Dr. Cordero' notes, he may have wanted an ESR, CRP, C3, and C4.     He notes that he is working with podiatry.     ROS:   As per HPI.       Physical Exam:       There were no vitals taken for this visit.               Physical Exam  Constitutional:       Appearance: Normal appearance.      Comments: Sitting in wheelchair   HENT:      Head: Normocephalic and atraumatic.   Eyes:      Extraocular Movements: Extraocular movements intact.      Conjunctiva/sclera: Conjunctivae normal.      Pupils: Pupils are equal, round, and reactive to light.   Cardiovascular:      Rate and Rhythm: Normal rate and regular rhythm.      Heart sounds: No murmur heard.     No friction rub. No gallop.   Pulmonary:      Effort: Pulmonary effort is normal.      Breath sounds: No wheezing, rhonchi or rales. "   Musculoskeletal:         General: Normal range of motion.      Right lower leg: No edema.      Left lower leg: No edema.      Comments: Sclerodactyly evident.     Skin:     General: Skin is warm and dry.      Comments: Dystrophic L thumb nail, desquamation over multiple fingers   Neurological:      Mental Status: He is alert and oriented to person, place, and time.   Psychiatric:         Mood and Affect: Mood normal.         Thought Content: Thought content normal.         Judgment: Judgment normal.           Imaging:    See oncologic history above.     Path:  See oncologic history above.      Assessment and Plan:     Ernie Walton is a 69 y.o. male with pmh significant for systemic sclerosis/scleroderma (dx'd 2020, on mycophenolate mofetil, prednisone, and nintendinab), HTN, HLD, BPH, h/o nephrolithiasis w/ recurrent UTI, and DDD who presents for f/u of the below lung cancer. His primary oncologist has been Dr. Erik Clay, and he has established care with Dr. Holley ([facility]).    1)  Stage IVB ([id number]) adenocarcinoma of the LLL (PD-L1 TPS 1%, no targetable mutations, STK11/TP53/KEAP1 co-mutations) w/ diffuse osseous disease , initially dx'd 5/15/24, s/p carboplatin/paclitaxel (6/5/24 - 9/17/24), radiographic evidence of progression in the left lung and skeleton, currently on 2L docetaxel/ramucirumab (12/23/24-present; DR starting C5 for mucositis, fatigue, anorexia)    Stage IVB Adenocarcinoma of the LLL. PD-L1 1%, No Targetable Mutations  ECOG PS 1.  Patient is currently on docetaxel/ramucirumab, treatment complicated by mucositis, fatigue, and decreased appetite (worsening with subsequent doses; improved with C5 dose delay and subsequent dose reduction).  His most recent imaging (CT C/A/P, 2/24/25, after 3 cycles of docetaxel/ramucirumab) demonstrates stable LLL nodule (2.6 x 2.6 cm), stable left hilar lymph node, 1.8 x 1.5 cm), stable scattered areas of osseous disease, and a new 1.1 x 0.9 cm right hepatic lobe  lesion.  Regarding the former, these lesions had notably demonstrated growth between scans on 9/27/24 and 11/25/24, indicating likely treatment effect in these areas given stability now. Regarding the hepatic lobe lesion, subsequent MRI imaging is consistent with metastatic disease. Given control of disease elsewhere, as well as scant systemic therapy options beyond the current line of treatment, favor attempting to manage the hepatic lesion w/ local therapies and continuing systemic therapy for the remainder of his disease. Pt has met with IR and is currently pending Y90 ablation of this lesion (mapping was on 4/22/25); discussed with IR, ideally would hold VEGF inhibition until procedure is complete but okay to proceed with cytotoxic chemotherapy in the interim.  Today (5/6/25), unfortunately the patient feels poorly, noting worsening shortness of breath, nausea, vomiting, decreased appetite, and overall fatigue requiring new use of a wheelchair.  Will hold on therapy given worsening symptoms which may be related either to progressive cancer vs possible infection (noting leukocytosis, though no focal infectious symptoms other than 2 days of N/V).  Will obtain repeat CT C/A/P now (initially planned for 5/19/25) and make treatment determination is accordingly, including need to proceed with Y90 pending those results.  All questions answered to apparent satisfaction.    Of note, patient previously discussed with the patient's rheumatologist Dr. Cordero who is in agreement to with holding immunotherapy in the setting of patient's significant autoimmune conditions.    Dose Reductions  C5-present (fatigue, mucositis, anorexia):  Docetaxel 60 mg/m2    PLAN  Hold C7 docetaxel/ramucirumab today (5/6/25), tentatively reschedule for after repeat imaging.  Will hold VEGF inhibition in advance of IR procedure should this still be planned.  CT C/A/P now  RTC at least 1 day after repeat imaging, labs (CBC, CMP, UA)and rescheduled  treatment same day  Referral to palliative care given stage IV disease and cancer-related pain, message sent to expedite      Peripheral Neuropathy  See previous note for description. Given timeline, most consistent with chemotherapy induced PN. Pt previously on pregabalin to minimal effect, transitioned to duloxetine. Pt endorses significant improvement since starting both duloxetine and starting acupuncture, noting he is able to feel acupuncture in parts of his feet which were previously insensate.   Continue duloxetine 60 mg daily  Continue acupuncture      Bone Mets  Pt w/ diffuse osseous disease involving the C/T/L/S spine, sternum, R scapular body, L scapular angle, several ribs, L ilium, L pubic body, and b/l ischia. Pt was previously on xgeva, last dose on 9/17/24. On discussion with Dr. Clay's staff, the patient did NOT receive dental clearance in advance of this. The pt confirms this, and previously noted that he was planning to undergo a dental extraction in 1/2025. He has since had two teeth extracted  He saw his dentist on 2/11/25 and has not been cleared yet, pending ongoing healing of his previous extraction site; he had a follow up on 3/23/25 to discuss further. Discussed need to hold on further osteoclast inhibition in advance of this. Upon dental clearance, will transition to zometa q12w due to distance from .   Dental clearance pending, this has been discussed repeatedly and the pt continues to see his dentist but endorses planned periodontal procedures      Scleroderma  Currently under the care of her rheumatologist in [place], on mycophenolate mofetil, nintendinab, and prednisone.  Active treatment precludes safe utilization of immunotherapy. Discussed with patient's primary rheumatologist Dr. Cordero, who both agreed with holding immunotherapy as well as with proceeding with ramucirumab.   F/u w/ Dr. Cordero       Mucositis  Resolved.   CTM      Cough  Likely due to lung cancer. Improving.    Continue guaifenesin-codeine  Continue benzonatate      The above information has been reviewed with the patient and all questions have been answered to their apparent satisfaction.  They understand that they can call the clinic with any questions.    Rudi Granados MD  Hematology/Oncology  [facility]      Route Chart for Scheduling    Med Onc Chart Routing      Follow up with physician .  CT C/A/P now  RTC at least 1 day after repeat imaging, labs (CBC, CMP, UA) and rescheduled treatment (just docetaxel) same day   Follow up with ADRIANNA    Infusion scheduling note    Injection scheduling note    Labs    Imaging    Pharmacy appointment    Other referrals                    Disclaimer: This note was prepared using voice recognition system and is likely to have sound alike errors.  Please call me with any questions.

## 2025-05-07 ENCOUNTER — TELEPHONE (OUTPATIENT)
Dept: HEMATOLOGY/ONCOLOGY | Facility: CLINIC | Age: 70
End: 2025-05-07
Payer: MEDICARE

## 2025-05-07 ENCOUNTER — TELEPHONE (OUTPATIENT)
Facility: HOSPITAL | Age: 70
End: 2025-05-07
Payer: MEDICARE

## 2025-05-07 ENCOUNTER — TELEPHONE (OUTPATIENT)
Dept: PALLIATIVE MEDICINE | Facility: CLINIC | Age: 70
End: 2025-05-07
Payer: MEDICARE

## 2025-05-07 NOTE — TELEPHONE ENCOUNTER
[facility] Peq Distress Thermometer    Question 5/6/2025  7:11 AM CDT - Filed by Patient   Use the following image as a reference for answering the question below.      Please select the number (0 - 10) that best describes how much distress you have been feeling in the past week, including today: 9   Have you had concerns about any of the items below? (select all that apply)    Physical Concerns Pain    Fatigue    Loss or change of physical abilities   Emotional Concerns None of these   Social Concerns None of these   Practical Concerns Taking care of myself   Spirtitual or Adventist Concerns None of these   Other problems    Distress Score (range: 0 - 10) 9 (Abnormal) Critical      SW called pt re: the above. Pt stated that he's been having fatigue/pain. Pt is scheduled to see Palliative next week. Pt stated he's staying with his brother for now and is not alone. Pt stated that he's taking one thing at a time, but will call SW if needed.

## 2025-05-07 NOTE — TELEPHONE ENCOUNTER
----- Message from HELENA Lake sent at 5/7/2025 10:34 AM CDT -----    ----- Message -----  From: Sandra Mcdaniels RN  Sent: 5/7/2025   9:10 AM CDT  To: Aleksandra Hameed RN; Mars Honeycutt LPN      ----- Message -----  From: Guerda Walker  Sent: 5/7/2025   8:59 AM CDT  To: Darwin Grijalva Staff    .Type:  Patient Returning CallWho Called:patient Who Left Message for Patient:Mars Honeycutt LPNDoes the patient know what this is regarding?:schedule testWould the patient rather a call back or a response via [facility]? Call Best Call Back Number:.[phone] Information:

## 2025-05-07 NOTE — TELEPHONE ENCOUNTER
"Spoke with Mr. Klein -   He has appointments at the [facility] next week. Phone call earlier, but nothing changed yet in [facility]. I will call next Weds to follow up and get back on schedule. He has "regressed" in his own words: poor appetite, muscle fatigue and weakness, neuropathy has picked up again.  "

## 2025-05-09 ENCOUNTER — HOSPITAL ENCOUNTER (OUTPATIENT)
Dept: RADIOLOGY | Facility: HOSPITAL | Age: 70
Discharge: HOME | End: 2025-05-09
Attending: HOSPITALIST
Payer: MEDICARE

## 2025-05-09 DIAGNOSIS — C34.32 ADENOCARCINOMA OF LOWER LOBE OF LEFT LUNG: ICD-10-CM

## 2025-05-09 PROCEDURE — 71260 CT THORAX DX C+: CPT | Mod: TC

## 2025-05-09 PROCEDURE — 74177 CT ABD & PELVIS W/CONTRAST: CPT | Mod: 26,,, | Performed by: RADIOLOGY

## 2025-05-09 PROCEDURE — 25500020 PHARM REV CODE 255: Performed by: HOSPITALIST

## 2025-05-09 PROCEDURE — 71260 CT THORAX DX C+: CPT | Mod: 26,,, | Performed by: RADIOLOGY

## 2025-05-09 RX ADMIN — IOHEXOL 30 ML: 350 INJECTION, SOLUTION INTRAVENOUS at 08:05

## 2025-05-09 RX ADMIN — IOHEXOL 75 ML: 350 INJECTION, SOLUTION INTRAVENOUS at 09:05

## 2025-05-12 ENCOUNTER — LAB VISIT (OUTPATIENT)
Dept: LAB | Facility: HOSPITAL | Age: 70
End: 2025-05-12
Attending: HOSPITALIST
Payer: MEDICARE

## 2025-05-12 ENCOUNTER — OFFICE VISIT (OUTPATIENT)
Dept: HEMATOLOGY/ONCOLOGY | Facility: CLINIC | Age: 70
End: 2025-05-12
Payer: MEDICARE

## 2025-05-12 ENCOUNTER — DOCUMENTATION ONLY (OUTPATIENT)
Dept: HEMATOLOGY/ONCOLOGY | Facility: CLINIC | Age: 70
End: 2025-05-12
Payer: MEDICARE

## 2025-05-12 VITALS — BODY MASS INDEX: 23.18 KG/M2 | HEIGHT: 71 IN | WEIGHT: 165.56 LBS | TEMPERATURE: 98 F

## 2025-05-12 DIAGNOSIS — C34.32 ADENOCARCINOMA OF LOWER LOBE OF LEFT LUNG: ICD-10-CM

## 2025-05-12 DIAGNOSIS — C78.7 SECONDARY MALIGNANT NEOPLASM OF LIVER: ICD-10-CM

## 2025-05-12 DIAGNOSIS — M34.9 SCLERODERMA: ICD-10-CM

## 2025-05-12 DIAGNOSIS — R53.81 DECLINING PERFORMANCE STATUS: ICD-10-CM

## 2025-05-12 DIAGNOSIS — C34.32 ADENOCARCINOMA OF LOWER LOBE OF LEFT LUNG: Primary | ICD-10-CM

## 2025-05-12 DIAGNOSIS — C77.1 SECONDARY MALIGNANT NEOPLASM OF INTRATHORACIC LYMPH NODES: ICD-10-CM

## 2025-05-12 LAB
ABSOLUTE NEUTROPHIL COUNT (OHS): 6.32 K/UL (ref 1.8–7.7)
ALBUMIN SERPL BCP-MCNC: 2.4 G/DL (ref 3.5–5.2)
ALP SERPL-CCNC: 78 UNIT/L (ref 40–150)
ALT SERPL W/O P-5'-P-CCNC: 18 UNIT/L (ref 10–44)
ANION GAP (OHS): 14 MMOL/L (ref 8–16)
AST SERPL-CCNC: 32 UNIT/L (ref 11–45)
BILIRUB SERPL-MCNC: 0.4 MG/DL (ref 0.1–1)
BILIRUB UR QL STRIP.AUTO: ABNORMAL
BUN SERPL-MCNC: 11 MG/DL (ref 8–23)
CALCIUM SERPL-MCNC: 10.4 MG/DL (ref 8.7–10.5)
CHLORIDE SERPL-SCNC: 103 MMOL/L (ref 95–110)
CLARITY UR: CLEAR
CO2 SERPL-SCNC: 23 MMOL/L (ref 23–29)
COLOR UR AUTO: ABNORMAL
CREAT SERPL-MCNC: 0.8 MG/DL (ref 0.5–1.4)
ERYTHROCYTE [DISTWIDTH] IN BLOOD BY AUTOMATED COUNT: 19.9 % (ref 11.5–14.5)
GFR SERPLBLD CREATININE-BSD FMLA CKD-EPI: >60 ML/MIN/1.73/M2
GLUCOSE SERPL-MCNC: 105 MG/DL (ref 70–110)
GLUCOSE UR QL STRIP: NEGATIVE
HCT VFR BLD AUTO: 33.1 % (ref 40–54)
HGB BLD-MCNC: 9.6 GM/DL (ref 14–18)
HGB UR QL STRIP: NEGATIVE
IMM GRANULOCYTES # BLD AUTO: 0.03 K/UL (ref 0–0.04)
KETONES UR QL STRIP: NEGATIVE
LEUKOCYTE ESTERASE UR QL STRIP: NEGATIVE
MCH RBC QN AUTO: 23.1 PG (ref 27–31)
MCHC RBC AUTO-ENTMCNC: 29 G/DL (ref 32–36)
MCV RBC AUTO: 80 FL (ref 82–98)
NITRITE UR QL STRIP: NEGATIVE
PH UR STRIP: 6 [PH]
PLATELET # BLD AUTO: 335 K/UL (ref 150–450)
PMV BLD AUTO: 10.3 FL (ref 9.2–12.9)
POTASSIUM SERPL-SCNC: 3.8 MMOL/L (ref 3.5–5.1)
PROT SERPL-MCNC: 9.4 GM/DL (ref 6–8.4)
PROT UR QL STRIP: NEGATIVE
RBC # BLD AUTO: 4.16 M/UL (ref 4.6–6.2)
SODIUM SERPL-SCNC: 140 MMOL/L (ref 136–145)
SP GR UR STRIP: 1.01
WBC # BLD AUTO: 8.92 K/UL (ref 3.9–12.7)

## 2025-05-12 PROCEDURE — 81003 URINALYSIS AUTO W/O SCOPE: CPT

## 2025-05-12 PROCEDURE — G2211 COMPLEX E/M VISIT ADD ON: HCPCS | Mod: S$PBB,,, | Performed by: HOSPITALIST

## 2025-05-12 PROCEDURE — 99214 OFFICE O/P EST MOD 30 MIN: CPT | Mod: PBBFAC | Performed by: HOSPITALIST

## 2025-05-12 PROCEDURE — 99999 PR PBB SHADOW E&M-EST. PATIENT-LVL IV: CPT | Mod: PBBFAC,,, | Performed by: HOSPITALIST

## 2025-05-12 PROCEDURE — 99215 OFFICE O/P EST HI 40 MIN: CPT | Mod: S$PBB,,, | Performed by: HOSPITALIST

## 2025-05-12 PROCEDURE — 85027 COMPLETE CBC AUTOMATED: CPT

## 2025-05-12 PROCEDURE — 36415 COLL VENOUS BLD VENIPUNCTURE: CPT

## 2025-05-12 PROCEDURE — 80053 COMPREHEN METABOLIC PANEL: CPT

## 2025-05-12 NOTE — PROGRESS NOTES
CATHERINE met with pt and pt's sister in exam room to discuss hospice care. SW explained hospice process and addressed pt's questions. SW addressed inpt vs outpt hospice, palliative care vs hospice, and other questions. Pt expressed understanding. Pt reported he would need to discuss his hospice decision with his family. SW gave pt SW's card to contact with decision on hospice. SW will remain available.    SW f/u with pt on 5/14/25. Pt reported he was not interested in hospice at this time and that he was scheduled to meet with palliative on 5/15/25. Pt reported he was interested in scheduling a f/u with Dr. Granados in the future to continue to discuss treatment and diagnosis. CATHERINE sent an update to MD and clinic staff. CATHERINE encouraged pt to contact SS as needed. SW will remain available.

## 2025-05-12 NOTE — PROGRESS NOTES
[facility] MEDICAL ONCOLOGY - FOLLOW UP VISIT    Reason for visit: Second opinion, Stage IV Adenocarcinoma of the Lung      Oncology History   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   5/15/2024 Imaging Significant Findings    CT C, Non-Con (f/u after imaging for scleroderma clinical trial workup)  - 2.5 x 2.4 x 1.8 cm LLL nodule (previously 2.2 x 2.2 x 1.8 cm, 3/25/24)  - Patchy ground-glass and nodular opacities adjacent to LLL nodule  - 1.4 cm left hilar lymph node  - Right lower paratracheal lymphadenopathy  - Lytic lesion with erosive changes in T2 vertebral body, new lytic lesions in manubrium, right fourth rib, T3, T7, and T11 vertebral bodies with pathologic fracture of superior endplate of T3 vertebral body     5/15/2024 Procedure    Bronch w/ EBUS  LLL TBBx  - Adenocarcinoma (Positive: TTF-1; Negative: p40)    LLL, Core Biopsy  - Adenocarcinoma    LN  - Positive: Station 11L  - Negative: Station 4R, 7, 4L    Caris NGS / PD-L1      - KEAP1, TP53, STK11 mutated  - ERBB2 negative / 1+  - TMB 7  - TORIE       6/5/2024 Imaging Significant Findings    PET CT  - 2.2 cm LLL mass, SUV 4.8  - 1.6 cm left hilar node, SUV 6.8  - 1.3 cm right lower paratracheal node, SUV 2.2  - Hypermetabolic lytic lesions in multiple bones (C7 transverse process, multiple thoracic, lumbar, and sacral segments, sternum, right scapular body, left scapular angle, several ribs, left ilium, left pubic body, bilateral ischia)     7/5/2024 Imaging Significant Findings    MRI Brain  - LANNY in brain or dura  - Mild enhancement within clivus, cannot rule out bony met     7/8/2024 Initial Diagnosis    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     7/8/2024 Cancer Staged    Staging form: Lung, AJCC 8th Edition  - Clinical: Stage IVB (cT2, cN1, pM1c)     8/9/2024 Imaging Significant Findings    MRI C/T/L Spine  Diffuse osseous metastasis throughout the visualized spine without any evidence of significant  compression fracture or retropulsion with involvement of the vertebral bodies and posterior elements at multiple levels.   No evidence of cervical/thoracic spinal cord compression with impingement of the ventral aspect of the cord from adjacent disc disease at multiple levels as discussed above.   Discal disease with foraminal stenosis at multiple levels throughout the spine with most prominent involvement seen at L4-5 and L5-S1 levels mostly degenerative in nature.   No abnormal intraspinal enhancement with no epidural enhancement noted        9/27/2024 Imaging Significant Findings    PET CT (compared against PET CT from 6/5/24)  1.4 x 1.0 cm LLL nodule (previously 2.0 x 1.3 cm), SUV 6.8 (similar)  New fairly defined opacity adjacent to the primary mass measuring up 1.8. The activity overlying the primary tumor is now confluent with the new opacity, leading to increased extent of activity.   1.2 x 2.0 cm L hilar LN (previously 1.4 x 2.2 cm), SUV 5.2  Numerous FDG-avid bone metastases are noted. Although previously visualized lesions show some areas of improvement, there are multiple new active bone metastases (L4, T8). The pattern suggests initial response to treatment with subsequent progressive disease      11/25/2024 Imaging Significant Findings    11/25/24 -- CT C/A/P  2.9 x 2.0 cm spiculated LLL pulmonary nodule  2.2 x 1.9 cm left hilar lymph node  Dependent pulmonary infiltrates  Extensive osseous metastatic disease (scattered lytic and sclerotic lesions throughout thoracic spine, manubrium, left scapula, pelvis, sacrum, and lumbar spine)     12/23/2024 -  Chemotherapy    Docetaxel/Ramucirumab  21 day cycle  Docetaxel 75 mg/m2  Ramucirumab 10 mg/kg     2/24/2025 Imaging Significant Findings    CT C/A/P  Stable 2.6 x 2.6 LLL nodule, previously 2.5 x 2.7 cm  Stable 1.8 x 1.5 cm left hilar lymph node, previously 2.0 x 1.5 cm  New 1.1 x 0.9 cm right hepatic lobe lesion  Stable scattered areas of osteo sclerotic  disease     3/5/2025 Imaging Significant Findings    MRI Abdomen  1.2 cm right hepatic dome lesion consistent with metastatic disease, no additional liver lesions     5/9/2025 Imaging Significant Findings    CT C/A/P  Mild enlargement of mediastinal lymph nodes (e.g.  1.8 cm right paratracheal lymph node, previously 1.3 cm; 1.6 cm subcarinal lymph node, previously 1.0 cm)  Multiple additional subcentimeter mediastinal lymph nodes unchanged (e.g. 1.7 cm left inferior hilar node)  Mild increase in size of bilateral pleural effusions, left greater than right  Stable 2.8 x 2.1 cm LLL mass  Stable diffuse bilateral mid to lower lung reticular interstitial opacities and ground-glass densities  Less conspicuous 1.2 x 1.0 cm liver lesion, no new liver lesions  Progressive bony destruction of the manubrium with surrounding soft tissue thickening        Previous Workup:   - 6/5/24:  Medical oncology (Dr. Erik Clay), discussed carboplatin/paclitaxel, plan to proceed  - 6/5/24: C1D1 carboplatin/paclitaxel  - 6/26/24: C2D1 carboplatin/paclitaxel    Oncology History    HPI:     Ernie Walton is a 69 y.o. male with pmh significant for systemic sclerosis/scleroderma (dx'd 2020, on mycophenolate mofetil, prednisone, and nintendinab), HTN, HLD, BPH, h/o nephrolithiasis w/ recurrent UTI, and DDD who presents for f/u of the below lung cancer. His primary oncologist has been Dr. Erik Clay, and he has established care with Dr. Holley ([facility]).    1)  Stage IVB ([id number]) adenocarcinoma of the LLL (PD-L1 TPS 1%, no targetable mutations, STK11/TP53/KEAP1 co-mutations) w/ diffuse osseous disease , initially dx'd 5/15/24, s/p carboplatin/paclitaxel (6/5/24 - 9/17/24), radiographic evidence of progression in the left lung and skeleton, currently on 2L docetaxel/ramucirumab (12/23/24-present;  starting C5 for mucositis, fatigue, anorexia)    Last clinic 5/6/25, patient feeling poorly, worsening shortness of breath, nausea, vomiting,  "decreased appetite, and fatigue, newly using wheelchair.  Hold on therapy given worsening symptoms.  CT C/A/P now and make treatment determination accordingly.  Referral to palliative care, requested expedited.     Interval History:  5/6/25: Docetaxel/ramucirumab held    The pt states that he is appreciative of having held the treatment last week, noting that he slept from ~14:00 until the next day. He notes that his energy and his appetite have both been slowly recovering since. He says that neither are back to baseline at this time.     He describes "soreness", which he says is from "coughing the cold up". He describes it as "across the back, across the front, all the way down to the waistline".     He denies any further nausea or vomiting.     He says "I'm still short of breath, and weak, I'd guess you'd call it". He says "I've been huffing and puffing it" not using a wheelchair today. He uses a walking stick at home.     When asked what a typical day looks like, he says that he has breakfast around 08:00, then he watches TV, he "lays around on the sofa" stating that he is trying to get his hands warm. He then will eat lunch, and then watch more TV. He says that, if the weather hits 77, he will go to sit outside and enjoy the weather. He says that he isn't eating much of dinner (last night, had yogurt in a cup, peaches, and apple sauce). He goes to bed after eating dinner. He notes that, when he initially came to  to stay with family, he would vacuum and wash laundry, but says he has not been able to do these things for the past couple of months. He says this is from "chemo", saying it is a "lack of energy, shortness of breath". He confirms that he spends more than 50% of the day sedentary; his sister says "you're lying down more than you're sitting".     ROS:   As per HPI.       Physical Exam:       Temp 97.7 °F (36.5 °C) (Temporal)   Ht 5' 11" (1.803 m)   Wt 75.1 kg (165 lb 9.1 oz)   BMI 23.09 kg/m²        "         Physical Exam  Constitutional:       Appearance: Normal appearance.      Comments: Sitting in wheelchair   HENT:      Head: Normocephalic and atraumatic.   Eyes:      Extraocular Movements: Extraocular movements intact.      Conjunctiva/sclera: Conjunctivae normal.      Pupils: Pupils are equal, round, and reactive to light.   Cardiovascular:      Rate and Rhythm: Normal rate and regular rhythm.      Heart sounds: No murmur heard.     No friction rub. No gallop.   Pulmonary:      Effort: Pulmonary effort is normal.      Breath sounds: No wheezing, rhonchi or rales.   Musculoskeletal:         General: Normal range of motion.      Right lower leg: No edema.      Left lower leg: No edema.      Comments: Sclerodactyly evident.     Skin:     General: Skin is warm and dry.      Comments: Dystrophic L thumb nail, desquamation over multiple fingers   Neurological:      Mental Status: He is alert and oriented to person, place, and time.   Psychiatric:         Mood and Affect: Mood normal.         Thought Content: Thought content normal.         Judgment: Judgment normal.           Imaging:    See oncologic history above.     Path:  See oncologic history above.      Assessment and Plan:     Ernie Walton is a 69 y.o. male with pmh significant for systemic sclerosis/scleroderma (dx'd 2020, on mycophenolate mofetil, prednisone, and nintendinab), HTN, HLD, BPH, h/o nephrolithiasis w/ recurrent UTI, and DDD who presents for f/u of the below lung cancer. His primary oncologist has been Dr. Erik Clay, and he has established care with Dr. Holley ([facility]).    1)  Stage IVB ([id number]) adenocarcinoma of the LLL (PD-L1 TPS 1%, no targetable mutations, STK11/TP53/KEAP1 co-mutations) w/ diffuse osseous disease , initially dx'd 5/15/24, s/p carboplatin/paclitaxel (6/5/24 - 9/17/24), radiographic evidence of progression in the left lung and skeleton, currently on 2L docetaxel/ramucirumab (12/23/24-present; DR starting C5 for mucositis,  fatigue, anorexia)    Stage IVB Adenocarcinoma of the LLL. PD-L1 1%, No Targetable Mutations  ECOG PS 3.  Patient is currently on docetaxel/ramucirumab, treatment complicated by mucositis, fatigue, and decreased appetite (worsening with subsequent doses; improved with C5 dose delay and subsequent dose reduction).  His most recent imaging (CT C/A/P, 5/9/25, ~6 months on docetaxel/ramucirumab) unfortunately demonstrates multiple areas of progression, including chiefly the bony destruction of the manubrium but also increasing size of bilateral pleural effusions as well as mediastinal lymphadenopathy; of note, mPFS per REVEL was 4.5 months.  At present, the patient's ECOG PS appears to be a 3, noting that he spends the majority of the day laying or sitting on a couch and is unable to help with household chores such as vacuuming and laundry, which he was able to assist with just a few months ago.  At last visit, he required use of a wheelchair, and said that he challenged himself today to forego a wheelchair but that it has been difficult due to SOB and fatigue. Today (5/12/25), I reiterated the relative paucity of treatment options given his diagnosis of scleroderma (precluding immunotherapy) and that, at this time, there are few effective treatment options remaining.  I discussed the idea of using gemcitabine, however also discussed my concern of the patient's performance status prevents safe administration of this drug and that use of further cytotoxic chemotherapy at this time risks accelerating the patient's demise rather than meaningfully extending his life.  Recognizing a lack of both tolerable and effective treatments,  I discussed the consideration of transitioning care from cancer-directed therapy to symptom-focused therapy in the form of enrollment in hospice.  We discussed the scope and role of hospice, and SW followed my conversation with further details. We also discussed the option of pursing comfort care  at this time should the pt not want to enroll in hospice. Finally, I reminded the patient that he is always able to receive a second opinion, but discussed my suspicion that it is unlikely he will find an oncologist who would recommend further cytotoxic chemotherapy at this juncture. Will follow up the SW conversation and proceed accordingly.     Of note, patient previously discussed with the patient's rheumatologist Dr. Cordero who is in agreement to with holding immunotherapy in the setting of patient's significant autoimmune conditions.    PLAN  Hold further cancer-directed therapy at this time  F/u SW conversation with patient to determine next best steps (comfort care vs hospice)  RTC pending the above, and will reach out to IR regarding planned hepatic ablation accordingly      Peripheral Neuropathy  See previous note for description. Given timeline, most consistent with chemotherapy induced PN. Pt previously on pregabalin to minimal effect, transitioned to duloxetine. Pt endorses significant improvement since starting both duloxetine and starting acupuncture, noting he is able to feel acupuncture in parts of his feet which were previously insensate.   Continue duloxetine 60 mg daily  Continue acupuncture      Bone Mets  Pt w/ diffuse osseous disease involving the C/T/L/S spine, sternum, R scapular body, L scapular angle, several ribs, L ilium, L pubic body, and b/l ischia. Pt was previously on xgeva, last dose on 9/17/24. On discussion with Dr. Clay's staff, the patient did NOT receive dental clearance in advance of this. The pt confirms this, and previously noted that he was planning to undergo a dental extraction in 1/2025. He has since had two teeth extracted  He saw his dentist on 2/11/25 and has not been cleared yet, pending ongoing healing of his previous extraction site; he had a follow up on 3/23/25 to discuss further. Discussed need to hold on further osteoclast inhibition in advance of this. Upon  dental clearance, will transition to zometa q12w due to distance from [place].   Dental clearance pending, this has been discussed repeatedly and the pt continues to see his dentist but endorses planned periodontal procedures      Scleroderma  Currently under the care of her rheumatologist in [place], on mycophenolate mofetil, nintendinab, and prednisone.  Active treatment precludes safe utilization of immunotherapy. Discussed with patient's primary rheumatologist Dr. Cordero, who both agreed with holding immunotherapy as well as with proceeding with ramucirumab.   F/u w/ Dr. Cordero     The above information has been reviewed with the patient and all questions have been answered to their apparent satisfaction.  They understand that they can call the clinic with any questions.    Rudi Granados MD  Hematology/Oncology  [facility]      Route Chart for Scheduling  Med Onc Route Chart for Scheduling      Disclaimer: This note was prepared using voice recognition system and is likely to have sound alike errors.  Please call me with any questions.

## 2025-05-13 ENCOUNTER — TELEPHONE (OUTPATIENT)
Dept: HEMATOLOGY/ONCOLOGY | Facility: CLINIC | Age: 70
End: 2025-05-13
Payer: MEDICARE

## 2025-05-13 NOTE — PROGRESS NOTES
Left message today to see if patient wants to come in for visit, LOV 02/24/14 Nurse received a message from CATHERINE regarding pt's request to r/s his palliative apt, nurse assist with r/s, pt aware of date/time/ location.

## 2025-05-13 NOTE — TELEPHONE ENCOUNTER
Pt needs to reschedule Palliative appt. Staff message sent to Palliative nurse ABENA Chisholm for rescheduling. SW will remain available.

## 2025-05-15 ENCOUNTER — RESULTS FOLLOW-UP (OUTPATIENT)
Dept: PALLIATIVE MEDICINE | Facility: CLINIC | Age: 70
End: 2025-05-15

## 2025-05-15 ENCOUNTER — OFFICE VISIT (OUTPATIENT)
Dept: PALLIATIVE MEDICINE | Facility: CLINIC | Age: 70
End: 2025-05-15
Payer: MEDICARE

## 2025-05-15 ENCOUNTER — HOSPITAL ENCOUNTER (OUTPATIENT)
Dept: RADIOLOGY | Facility: HOSPITAL | Age: 70
Discharge: HOME | End: 2025-05-15
Attending: NURSE PRACTITIONER
Payer: MEDICARE

## 2025-05-15 VITALS
OXYGEN SATURATION: 89 % | HEIGHT: 71 IN | SYSTOLIC BLOOD PRESSURE: 118 MMHG | BODY MASS INDEX: 23.18 KG/M2 | HEART RATE: 130 BPM | WEIGHT: 165.56 LBS | DIASTOLIC BLOOD PRESSURE: 82 MMHG | TEMPERATURE: 99 F

## 2025-05-15 DIAGNOSIS — M34.9 SCLERODERMA: ICD-10-CM

## 2025-05-15 DIAGNOSIS — C34.32 ADENOCARCINOMA OF LOWER LOBE OF LEFT LUNG: ICD-10-CM

## 2025-05-15 DIAGNOSIS — R05.1 ACUTE COUGH: ICD-10-CM

## 2025-05-15 DIAGNOSIS — G89.3 NEOPLASM RELATED PAIN: ICD-10-CM

## 2025-05-15 DIAGNOSIS — Z51.5 ENCOUNTER FOR PALLIATIVE CARE: ICD-10-CM

## 2025-05-15 DIAGNOSIS — C79.51 METASTASIS TO BONE: ICD-10-CM

## 2025-05-15 DIAGNOSIS — G62.9 PERIPHERAL POLYNEUROPATHY: Primary | ICD-10-CM

## 2025-05-15 DIAGNOSIS — R05.2 SUBACUTE COUGH: ICD-10-CM

## 2025-05-15 PROCEDURE — 71046 X-RAY EXAM CHEST 2 VIEWS: CPT | Mod: TC

## 2025-05-15 PROCEDURE — 99999 PR PBB SHADOW E&M-EST. PATIENT-LVL V: CPT | Mod: PBBFAC,,, | Performed by: NURSE PRACTITIONER

## 2025-05-15 PROCEDURE — 71046 X-RAY EXAM CHEST 2 VIEWS: CPT | Mod: 26,,, | Performed by: RADIOLOGY

## 2025-05-15 PROCEDURE — 99215 OFFICE O/P EST HI 40 MIN: CPT | Mod: PBBFAC,25 | Performed by: NURSE PRACTITIONER

## 2025-05-15 RX ORDER — OXYCODONE AND ACETAMINOPHEN 10; 325 MG/1; MG/1
1 TABLET ORAL EVERY 4 HOURS PRN
Qty: 42 TABLET | Refills: 0 | Status: SHIPPED | OUTPATIENT
Start: 2025-05-15 | End: 2025-05-22

## 2025-05-15 RX ORDER — DEXAMETHASONE 4 MG/1
4 TABLET ORAL DAILY
Qty: 7 TABLET | Refills: 0 | Status: SHIPPED | OUTPATIENT
Start: 2025-05-15 | End: 2025-05-22

## 2025-05-15 NOTE — ASSESSMENT & PLAN NOTE
"Impression:  Symptoms:  back pain, cough  Medicolegal: Mr Walton has decision making capacity.  Will discuss SDM/HC POA at visit next week. Written information provided for ACP/GOC.  Psychosocial:  support system consists of brother, sister, spouse, daughter  Prognostication: 6 months or less is reasonable.  Understanding of disease and Illness Trajectory: Patient  has  adequate understanding of his illness, they can benefit from continued education on what to expect in the future.  Goals of care:  Would like whatever treatment he can take to "get back to himself." He would like cancer tx if able to receive.     Summary and recommendations: Aggressively tx pain, CXR to evaluate cough, continue ACP, GOC discussions.    "

## 2025-05-15 NOTE — ASSESSMENT & PLAN NOTE
Followed by rheumatology in [place]  Immunotherapy was on hold so he can receive cancer-directed therapy.  Mr Walton plans to contact rheumatologist for tx recommendations.

## 2025-05-15 NOTE — ASSESSMENT & PLAN NOTE
Discussed management options for cancer-related pain.  After discussion of risks/benefits dexamethasone and Percocet 10/325 prescribed.   Will reevaluate after one week.  Consider addition of long acting opioid.

## 2025-05-15 NOTE — ASSESSMENT & PLAN NOTE
Pelvis, thoracic spine and scapula lesions noted 11/2024 CT.   Oncologist recommended transition to hospice due to ECOG 3 and poor tolerance of systemic tx  Will focus on bone-related pain tx today

## 2025-05-15 NOTE — ASSESSMENT & PLAN NOTE
Pt previously on pregabalin to minimal effect, transitioned to duloxetine. Pt endorses significant improvement since starting both duloxetine and starting acupuncture, noting he is able to feel acupuncture in parts of his feet which were previously insensate.

## 2025-05-15 NOTE — PROGRESS NOTES
Palliative Medicine Clinic Note        Consult Requested By: Aaareferral Self      Reason for Consult: symptom management, goals of care, and advanced care planning    Chief Complaint:   Chief Complaint   Patient presents with    Pain And Symptom Management        ASSESSMENT/PLAN:      Plan/Recommendations:  1. Peripheral polyneuropathy  Assessment & Plan:  Pt previously on pregabalin to minimal effect, transitioned to duloxetine. Pt endorses significant improvement since starting both duloxetine and starting acupuncture, noting he is able to feel acupuncture in parts of his feet which were previously insensate.       2. 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  -     dexAMETHasone (DECADRON) 4 MG Tab; Take 1 tablet (4 mg total) by mouth once daily. for 7 days  Dispense: 7 tablet; Refill: 0  -     oxyCODONE-acetaminophen (PERCOCET)  mg per tablet; Take 1 tablet by mouth every 4 (four) hours as needed for Pain.  Dispense: 42 tablet; Refill: 0    3. Metastasis to bone  Assessment & Plan:  Pelvis, thoracic spine and scapula lesions noted 11/2024 CT.   Oncologist recommended transition to hospice due to ECOG 3 and poor tolerance of systemic tx  Will focus on bone-related pain tx today      Orders:  -     dexAMETHasone (DECADRON) 4 MG Tab; Take 1 tablet (4 mg total) by mouth once daily. for 7 days  Dispense: 7 tablet; Refill: 0  -     oxyCODONE-acetaminophen (PERCOCET)  mg per tablet; Take 1 tablet by mouth every 4 (four) hours as needed for Pain.  Dispense: 42 tablet; Refill: 0    4. Scleroderma  Assessment & Plan:  Followed by rheumatology in [place]  Immunotherapy was on hold so he can receive cancer-directed therapy.  Mr Walton plans to contact rheumatologist for tx recommendations.      5. Acute cough  Assessment & Plan:  Discussed possible causes, mgmt options.   In line with his goals will get CXR today to further assess.         6. Neoplasm related pain  Assessment & Plan:  Discussed management  "options for cancer-related pain.  After discussion of risks/benefits dexamethasone and Percocet 10/325 prescribed.   Will reevaluate after one week.  Consider addition of long acting opioid.        7. Encounter for palliative care  Assessment & Plan:  Impression:  Symptoms:  back pain, cough  Medicolegal: Mr Walton has decision making capacity.  Will discuss SDM/HC POA at visit next week. Written information provided for ACP/GOC.  Psychosocial:  support system consists of brother, sister, spouse, daughter  Prognostication: 6 months or less is reasonable.  Understanding of disease and Illness Trajectory: Patient  has  adequate understanding of his illness, they can benefit from continued education on what to expect in the future.  Goals of care:  Would like whatever treatment he can take to "get back to himself." He would like cancer tx if able to receive.     Summary and recommendations: Aggressively tx pain, CXR to evaluate cough, continue ACP, GOC discussions.        8. Subacute cough  Assessment & Plan:  Discussed possible causes, mgmt options.   In line with his goals will get CXR today to further assess.       Orders:  -     X-Ray Chest PA And Lateral; Future; Expected date: 05/15/2025        Advance Care Planning   Advance Directives:   Goals of Care: The patient endorses that what is most important right now is to focus on curative/life-prolongation (regardless of treatment burdens)    Accordingly, we have decided that the best plan to meet the patient's goals includes continuing with treatment.            Thank you for involving me in the care of this patient.     No follow-ups on file.    Future Appointments   Date Time Provider Department Center   5/15/2025 12:15 PM [facility] XR2 [facility]   5/15/2025  2:40 PM JEREMY Yeh MD [facility] IM [facility]   5/30/2025 10:30 AM [facility] [id number] [facility] RAD IR [facility]   5/30/2025 11:00 AM [facility] NM2 INFINIA 400LB LIMIT [facility] NUCLEAR [address]   5/30/2025 12:00 PM " [facility]   5/30/2025 12:45 PM [facility] NM2 [facility]FINIA [facility]   6/17/2025  2:00 PM Analisa Love MD [facility] INT [facility]   7/8/2025 10:45 AM [facility]        SUBJECTIVE:      History of Present Illness / Interval History:  Ernie Walton is 69 y.o. 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  He began seeing Dr. Granados for 2nd opinion.  He was having difficulty tolerating his cancer treatment.  On May 12th Dr. Granados recommended holding cancer directed therapy and hospice care. Mr Walton does not believe he is ready for hospice. Medical history is also significant for scleroderma, hypertension, hyperlipidemia, BPH, nephrolithiasis with recurrent UTIs and degenerative disc disease.      Previously tx in [place] [facility] and [facility].  Was not a candidate for trials and no tx recommended.     Presents to Palliative Care Clinic for physical symptoms, advance care planning,, and additional support..     Please see oncology note for more details.        5/15/25  History obtained from: patient, family, medical record, and care everywhere.    Cough, onset few months ago. White sputum. Severe pain to back and chest with cough. Taking Amoxil for peridental infection.    Now too weak for chemotherapy. Doesn't fell like he needs hospice at this point. He would like to "get back to himself."   Plans to take Advil Liquidgel for back pain. Previously taking ibuprofen.    Living with his brother in . Sister local and helps with appts.  Ambulates well in home, uses straight cane when away from home. No injury    Off of mycophenolate while taking chemotherapy.     , spouse lives in [place] caring for elderly parent.       Today we discussed role of palliative care, symptom management, goals of care, and advanced care " planning.        ROS:  Review of Systems    Review of Symptoms      Symptom Assessment (ESAS 0-10 Scale)  Pain:  10  Dyspnea:  8  Anxiety:  0  Nausea:  0  Depression:  0  Anorexia:  0  Fatigue:  9  Insomnia:  10  Restlessness:  10  Agitation:  0         Pain Assessment:    Location(s): back    Back       Location: generalized        Quality: Aching        Quantity: 10/10 in intensity        Chronicity: Onset 3 month(s) ago, gradually worsening        Aggravating Factors: None        Alleviating Factors: None       Associated Symptoms: None    Psychosocial/Cultural:   See Palliative Psychosocial Note: Yes  **Primary  to Follow**  Palliative Care  Consult: No        Medications:  Current Medications[1]    External  database queried on 05/15/2025  by Danica NAJERA :         Review of patient's allergies indicates:   Allergen Reactions    Milk containing products (dairy) Nausea And Vomiting     Per pt milked when used with cooking causes pt to vomit        OBJECTIVE:   Physical Exam:  Vitals: Temp: 98.8 °F (37.1 °C) (05/15/25 1052)  Pulse: (!) 130 (05/15/25 1052)  BP: 118/82 (05/15/25 1052)  SpO2: (!) 89 % (05/15/25 1052)    Physical Exam    Wt Readings from Last 3 Encounters:   05/15/25 1052 75.1 kg (165 lb 9.1 oz)   25 1328 75.1 kg (165 lb 9.1 oz)   25 0830 75 kg (165 lb 4.8 oz)     BP Readings from Last 3 Encounters:   05/15/25 118/82   25 108/72   25 122/77       Labs:  Lab Results   Component Value Date    WBC 8.92 2025    HGB 9.6 (L) 2025    HCT 33.1 (L) 2025    MCV 80 (L) 2025     2025           Imagin25 CT  Impression:     1. No significant change in irregular left lower lobe lung mass.  2. Mild increase in size of small bilateral pleural effusions.  3. Mild increase in size of mediastinal lymphadenopathy.  4. No significant change in solitary hypoattenuating hepatic lesion, concerning for metastasis.  5.  Widespread osseous metastases with progressive bony destruction and soft tissue thickening of the manubrium.        Electronically signed by:EDI Elizabeth MD  Date:                                            05/09/2025       I spent a total of 65 minutes on the day of the visit. This includes face to face time in discussion of goals of care, symptom assessment, coordination of care and emotional support.  This also includes non-face to face time preparing to see the patient (eg, review of tests/imaging), obtaining and/or reviewing separately obtained history, documenting clinical information in the electronic or other health record, independently interpreting results and communicating results to the patient/family/caregiver, or care coordinator.     Additional 10 min time spent on a voluntary advance care planning and /or goals of care discussion, providing emotional support, formulating and communicating prognosis and exploring burden/benefit of various approaches of treatment.       Danica Cook NP         [1]   Current Outpatient Medications:     amLODIPine (NORVASC) 5 MG tablet, Take 1 tablet by mouth once daily., Disp: , Rfl:     amoxicillin (AMOXIL) 500 MG capsule, TAKE 1 CAPSULE BY MOUTH THREE TIMES DAILY UNTIL ALL TAKEN, Disp: , Rfl:     benzonatate (TESSALON) 100 MG capsule, Take 1 capsule (100 mg total) by mouth 3 (three) times daily as needed for Cough., Disp: 90 capsule, Rfl: 2    chlorhexidine (PERIDEX) 0.12 % solution, RINSE MOUTH WITH 15 ML ONCE DAILY IN THE EVENING, Disp: , Rfl:     cyanocobalamin 1,000 mcg/mL injection, Inject 1,000 mcg into the muscle every 30 days., Disp: , Rfl:     diphenhydrAMINE (BENADRYL) 50 MG capsule, Take 50mg by mouth 1 hour before contrast administration., Disp: 1 capsule, Rfl: 0    diphenhydrAMINE-aluminum-magnesium hydroxide-simethicone-LIDOcaine viscous HCl 2%, Swish and spit 15 mLs every 4 (four) hours as needed., Disp: 1 each, Rfl: 2    DULoxetine (CYMBALTA)  60 MG capsule, Take 1 capsule (60 mg total) by mouth once daily. for 120 doses, Disp: 30 capsule, Rfl: 3    ferrous sulfate (FEOSOL) 325 mg (65 mg iron) Tab tablet, Take 325 mg by mouth with breakfast., Disp: , Rfl:     fluticasone propionate (FLONASE) 50 mcg/actuation nasal spray, 1 spray by Each Nostril route once daily., Disp: , Rfl:     folic acid, bulk, 100 % Powd, Take 666 mcg by mouth., Disp: , Rfl:     Lactobacillus rhamnosus GG (CULTURELLE) 10 billion cell capsule, Take 1 capsule by mouth every morning., Disp: , Rfl:     LIDOcaine (LIDODERM) 5 %, Place 1 patch onto the skin once daily. Remove & Discard patch within 12 hours or as directed by MD, Disp: 10 patch, Rfl: 0    LIDOcaine 5 % Crea, SMARTSIG:Sparingly Topical 3 Times Daily, Disp: , Rfl:     LIDOcaine viscous HCl 2% (XYLOCAINE) 2 % Soln, , Disp: , Rfl:     lisinopriL 10 MG tablet, Take 2 tablets by mouth once daily., Disp: , Rfl:     magic mouthwash diphen/antac/lidoc, Swish and spit 15 mLs every 4 (four) hours as needed., Disp: 450 mL, Rfl: 2    mupirocin (BACTROBAN) 2 % ointment, Apply 1 Application topically., Disp: , Rfl:     nintedanib (OFEV) 100 mg Cap, Take 100 mg by mouth 2 (two) times daily., Disp: , Rfl:     omega 3-dha-epa-fish oil 1,000 mg (120 mg-180 mg) Cap, Take 1,000 mg by mouth., Disp: , Rfl:     ondansetron (ZOFRAN-ODT) 4 MG TbDL, Take 4 mg by mouth., Disp: , Rfl:     pantoprazole (PROTONIX) 40 MG tablet, Take 40 mg by mouth., Disp: , Rfl:     phenazopyridine (PYRIDIUM) 200 MG tablet, Take 200 mg by mouth 3 (three) times daily as needed., Disp: , Rfl:     pregabalin (LYRICA) 75 MG capsule, Take 75 mg by mouth 2 (two) times daily., Disp: , Rfl:     raloxifene (EVISTA) 60 mg tablet, Take 1 tablet by mouth once daily., Disp: , Rfl:     sildenafil (REVATIO) 20 mg Tab, Take 1 tablet by mouth 3 (three) times daily., Disp: , Rfl:     tamsulosin (FLOMAX) 0.4 mg Cap, Take 1 capsule by mouth once daily., Disp: , Rfl:     triamcinolone  acetonide 0.1% (KENALOG) 0.1 % ointment, APPLY OINTMENT TOPICALLY TO AFFECTED AREA TWICE DAILY FOR 30 DAYS, FOR LEFT RING FINGER., Disp: , Rfl:     vitamin D (VITAMIN D3) 1000 units Tab, Take by mouth once daily., Disp: , Rfl:     vitamin E 100 unit/0.25 mL Drop, Take 180 Units by mouth., Disp: , Rfl:     vitamin E, dl, acetate, 90 mg (200 unit) Cap, Take 1 capsule by mouth every morning., Disp: , Rfl:     atorvastatin (LIPITOR) 40 MG tablet, Take 1 tablet by mouth once daily. (Patient not taking: Reported on 5/15/2025), Disp: , Rfl:     dexAMETHasone (DECADRON) 4 MG Tab, Take 1 tablet (4 mg total) by mouth once daily. for 7 days, Disp: 7 tablet, Rfl: 0    oxyCODONE-acetaminophen (PERCOCET)  mg per tablet, Take 1 tablet by mouth every 4 (four) hours as needed for Pain., Disp: 42 tablet, Rfl: 0

## 2025-05-15 NOTE — ASSESSMENT & PLAN NOTE
Discussed possible causes, mgmt options.   In line with his goals will get CXR today to further assess.

## 2025-05-20 ENCOUNTER — OFFICE VISIT (OUTPATIENT)
Dept: INTERNAL MEDICINE | Facility: CLINIC | Age: 70
End: 2025-05-20
Payer: MEDICARE

## 2025-05-20 VITALS
BODY MASS INDEX: 23.06 KG/M2 | TEMPERATURE: 98 F | SYSTOLIC BLOOD PRESSURE: 110 MMHG | DIASTOLIC BLOOD PRESSURE: 68 MMHG | RESPIRATION RATE: 20 BRPM | HEART RATE: 120 BPM | WEIGHT: 165.38 LBS

## 2025-05-20 DIAGNOSIS — G62.9 PERIPHERAL POLYNEUROPATHY: ICD-10-CM

## 2025-05-20 DIAGNOSIS — Z86.0100 HISTORY OF COLONIC POLYPS: ICD-10-CM

## 2025-05-20 DIAGNOSIS — D63.8 ANEMIA, CHRONIC DISEASE: ICD-10-CM

## 2025-05-20 DIAGNOSIS — C34.32 ADENOCARCINOMA OF LOWER LOBE OF LEFT LUNG: Primary | ICD-10-CM

## 2025-05-20 DIAGNOSIS — Z00.00 ROUTINE GENERAL MEDICAL EXAMINATION AT A HEALTH CARE FACILITY: ICD-10-CM

## 2025-05-20 DIAGNOSIS — Z87.891 EX-SMOKER: ICD-10-CM

## 2025-05-20 DIAGNOSIS — C79.51 METASTASIS TO BONE: ICD-10-CM

## 2025-05-20 DIAGNOSIS — M34.9 SCLERODERMA: ICD-10-CM

## 2025-05-20 DIAGNOSIS — Z79.899 OTHER LONG TERM (CURRENT) DRUG THERAPY: ICD-10-CM

## 2025-05-20 DIAGNOSIS — Z12.5 ENCOUNTER FOR SCREENING FOR MALIGNANT NEOPLASM OF PROSTATE: ICD-10-CM

## 2025-05-20 PROBLEM — R05.2 SUBACUTE COUGH: Status: RESOLVED | Noted: 2025-05-15 | Resolved: 2025-05-20

## 2025-05-20 PROBLEM — I73.00 RAYNAUD'S DISEASE: Status: ACTIVE | Noted: 2025-05-20

## 2025-05-20 PROBLEM — Z87.442 HISTORY OF NEPHROLITHIASIS: Status: ACTIVE | Noted: 2022-12-06

## 2025-05-20 PROBLEM — N20.0 NEPHROLITHIASIS: Status: ACTIVE | Noted: 2022-12-06

## 2025-05-20 PROCEDURE — 99999 PR PBB SHADOW E&M-EST. PATIENT-LVL V: CPT | Mod: PBBFAC,,, | Performed by: FAMILY MEDICINE

## 2025-05-20 PROCEDURE — G2211 COMPLEX E/M VISIT ADD ON: HCPCS | Mod: S$PBB,,, | Performed by: FAMILY MEDICINE

## 2025-05-20 PROCEDURE — 99204 OFFICE O/P NEW MOD 45 MIN: CPT | Mod: S$PBB,,, | Performed by: FAMILY MEDICINE

## 2025-05-20 PROCEDURE — 99215 OFFICE O/P EST HI 40 MIN: CPT | Mod: PBBFAC | Performed by: FAMILY MEDICINE

## 2025-05-20 RX ORDER — B1/B2/B3/B5/B6/IRON/METH/CHOLN 2.5-18/15
1 LIQUID (ML) ORAL DAILY
COMMUNITY

## 2025-05-20 NOTE — PROGRESS NOTES
Subjective:       Patient ID: Ernie Walton is a 69 y.o. male presents with Problem List[1]     Chief Complaint: Establish Care    History of Present Illness    Ernie presents today to establish care and for follow up of Stage IV cancer He was diagnosed with stage IV cancer in 2024 after a spot was found on the lungs. He completed six rounds of chemotherapy at [facility] under Dr. Bailey's care, followed by treatment at [facility] in [place]. He is currently unable to continue chemotherapy due to weakness. He reports pleural effusion with associated cough and shortness of breath. His weight has decreased significantly from 190-205 lbs to 168 lbs. He was diagnosed with systemic scleroderma affecting the whole body in January 2020. He has Raynaud's syndrome. He has a history of  kidney stones treated with lithotripsy, and urethral stricture. He is a former smoker.      ROS:  General: -fever, -chills, -fatigue, -weight gain, +weight loss, -loss of appetite  Eyes: -vision changes, -blurry vision, -eye pain, -eye discharge  ENT: -ear pain, -hearing loss, -tinnitus, -nasal congestion, -sore throat  Cardiovascular: -chest pain, -palpitations, -lower extremity edema  Respiratory: +cough, +shortness of breath, -wheezing, -sputum production  Endocrine: -polyuria, -polydipsia, -heat intolerance, -cold intolerance  Gastrointestinal: -abdominal pain, -heartburn, -nausea, -vomiting, -diarrhea, -constipation, -blood in stool  Genitourinary: -dysuria, -urgency, -frequency, -hematuria, -nocturia, -incontinence  Heme & Lymphatic: -easy or excessive bleeding, -easy bruising, -swollen lymph nodes  Musculoskeletal: -muscle pain, -back pain, -joint pain, -joint swelling  Skin: -rash, -lesion, -itching, -skin texture changes, -skin color changes  Neurological: -headache, -dizziness, -numbness, -tingling, -seizure activity, -speech difficulty, -memory loss, -confusion  Psychiatric: -anxiety, -depression, -sleep  difficulty  Male Genitourinary: +difficulty urinating                /68 (BP Location: Right arm, Patient Position: Sitting)   Pulse (!) 120   Temp 97.5 °F (36.4 °C) (Temporal)   Resp 20   Wt 75 kg (165 lb 5.5 oz)   BMI 23.06 kg/m²   Objective:      Physical Exam  Constitutional:       Appearance: He is well-developed.   HENT:      Head: Normocephalic and atraumatic.   Cardiovascular:      Rate and Rhythm: Normal rate and regular rhythm.      Heart sounds: Normal heart sounds. No murmur heard.  Pulmonary:      Effort: Pulmonary effort is normal.      Breath sounds: Normal breath sounds. No wheezing.   Abdominal:      General: Bowel sounds are normal.      Palpations: Abdomen is soft.      Tenderness: There is no abdominal tenderness.   Skin:     General: Skin is warm and dry.      Findings: No rash.   Neurological:      Mental Status: He is alert and oriented to person, place, and time.   Psychiatric:         Mood and Affect: Mood normal.         Lab Visit on 05/12/2025   Component Date Value Ref Range Status    WBC 05/12/2025 8.92  3.90 - 12.70 K/uL Final    RBC 05/12/2025 4.16 (L)  4.60 - 6.20 M/uL Final    HGB 05/12/2025 9.6 (L)  14.0 - 18.0 gm/dL Final    HCT 05/12/2025 33.1 (L)  40.0 - 54.0 % Final    MCV 05/12/2025 80 (L)  82 - 98 fL Final    MCH 05/12/2025 23.1 (L)  27.0 - 31.0 pg Final    MCHC 05/12/2025 29.0 (L)  32.0 - 36.0 g/dL Final    RDW 05/12/2025 19.9 (H)  11.5 - 14.5 % Final    Platelet Count 05/12/2025 335  150 - 450 K/uL Final    MPV 05/12/2025 10.3  9.2 - 12.9 fL Final    Neut # 05/12/2025 6.32  1.8 - 7.7 K/uL Final    Imm Grans # 05/12/2025 0.03  0.00 - 0.04 K/uL Final    Mild elevation in immature granulocytes is non specific and can be seen in a variety of conditions including stress response, acute inflammation, trauma and pregnancy. Correlation with other laboratory and clinical findings is essential.    Sodium 05/12/2025 140  136 - 145 mmol/L Final    Potassium 05/12/2025 3.8   3.5 - 5.1 mmol/L Final    Chloride 05/12/2025 103  95 - 110 mmol/L Final    CO2 05/12/2025 23  23 - 29 mmol/L Final    Glucose 05/12/2025 105  70 - 110 mg/dL Final    BUN 05/12/2025 11  8 - 23 mg/dL Final    Creatinine 05/12/2025 0.8  0.5 - 1.4 mg/dL Final    Calcium 05/12/2025 10.4  8.7 - 10.5 mg/dL Final    Protein Total 05/12/2025 9.4 (H)  6.0 - 8.4 gm/dL Final    Albumin 05/12/2025 2.4 (L)  3.5 - 5.2 g/dL Final    Bilirubin Total 05/12/2025 0.4  0.1 - 1.0 mg/dL Final    For infants and newborns, interpretation of results should be based   on gestational age, weight and in agreement with clinical   observations.    Premature Infant recommended reference ranges:   0-24 hours:  <8.0 mg/dL   24-48 hours: <12.0 mg/dL   3-5 days:    <15.0 mg/dL   6-29 days:   <15.0 mg/dL    ALP 05/12/2025 78  40 - 150 unit/L Final    AST 05/12/2025 32  11 - 45 unit/L Final    ALT 05/12/2025 18  10 - 44 unit/L Final    Anion Gap 05/12/2025 14  8 - 16 mmol/L Final    eGFR 05/12/2025 >60  >60 mL/min/1.73/m2 Final    Estimated GFR calculated using the CKD-EPI creatinine (2021) equation.    Color, UA 05/12/2025 Pinky  Straw, Pinky, Yellow, Light-Orange Final    Appearance, UA 05/12/2025 Clear  Clear Final    pH, UA 05/12/2025 6.0  5.0 - 8.0 Final    Spec Grav UA 05/12/2025 1.015  1.005 - 1.030 Final    Protein, UA 05/12/2025 Negative  Negative Final    Recommend a 24 hour urine protein or a urine protein/creatinine ratio if globulin induced proteinuria is clinically suspected.    Glucose, UA 05/12/2025 Negative  Negative Final    Ketones, UA 05/12/2025 Negative  Negative Final    Bilirubin, UA 05/12/2025 1+ (A)  Negative Final    Positive urine bilirubin is not confirmed. Correlate with serum bilirubin and clinical presentation.    Blood, UA 05/12/2025 Negative  Negative Final    Nitrites, UA 05/12/2025 Negative  Negative Final    Leukocyte Esterase, UA 05/12/2025 Negative  Negative Final       Assessment/Plan:   1. Routine general medical  examination at a health care facility  -     Lipid Panel; Future; Expected date: 05/20/2025  -     TSH; Future; Expected date: 05/20/2025  -     Hemoglobin A1C; Future; Expected date: 05/20/2025  -     PSA, Screening; Future; Expected date: 05/20/2025  -     Hepatitis C Antibody; Future; Expected date: 05/20/2025  Reviewed recent labs showing chronic anemia  Will check further labs  Advised to check with hematologist and consider getting 2nd dose of pneumonia vaccination  Up-to-date with abdominal aortic screening    2. 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  -     Vitamin B12; Future; Expected date: 05/20/2025  -     Folate; Future; Expected date: 05/20/2025  3. Metastasis to bone  Currently followed by Dr. Bailey, unable to do chemo secondary to extreme weakness    4. Scleroderma    5. Peripheral polyneuropathy  -     Vitamin B12; Future; Expected date: 05/20/2025  -     Folate; Future; Expected date: 05/20/2025  Currently on Lyrica    6. Anemia, chronic disease  -     Iron and TIBC; Future; Expected date: 05/20/2025  -     Ferritin; Future; Expected date: 05/20/2025  -     Vitamin B12; Future; Expected date: 05/20/2025  -     Folate; Future; Expected date: 05/20/2025  Will check further labs    7. Encounter for screening for malignant neoplasm of prostate  -     PSA, Screening; Future; Expected date: 05/20/2025    8. Other long term (current) drug therapy  -     Vitamin B12; Future; Expected date: 05/20/2025    9. Ex-smoker  Overview:  46 pack years; quit 9/3/2019 (was 1 ppd for 46 years)      10. History of colonic polyps  Patient reports that he is due for colonoscopy, last one was done in 2020 showing 6 colon polyps and was advised to get it in 5 years, patient was encouraged to discuss further with oncologist and consider getting screening done    Visit today included increased complexity associated with the care of the episodic problem  addressed and managing the longitudinal care of the patient due to  the serious and/or complex managed problem(s) .            This note was generated with the assistance of ambient listening technology. Verbal consent was obtained by the patient and accompanying visitor(s) for the recording of patient appointment to facilitate this note. I attest to having reviewed and edited the generated note for accuracy, though some syntax or spelling errors may persist. Please contact the author of this note for any clarification.            [1]   Patient Active Problem List  Diagnosis    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    Metastasis to bone    Peripheral neuropathy    Scleroderma    Neoplasm related pain    Encounter for palliative care    History of nephrolithiasis    Raynaud's disease    Ex-smoker    History of colonic polyps

## 2025-05-22 ENCOUNTER — OFFICE VISIT (OUTPATIENT)
Dept: PALLIATIVE MEDICINE | Facility: CLINIC | Age: 70
End: 2025-05-22
Payer: MEDICARE

## 2025-05-22 ENCOUNTER — LAB VISIT (OUTPATIENT)
Dept: LAB | Facility: HOSPITAL | Age: 70
End: 2025-05-22
Attending: FAMILY MEDICINE
Payer: MEDICARE

## 2025-05-22 VITALS
DIASTOLIC BLOOD PRESSURE: 82 MMHG | TEMPERATURE: 97 F | OXYGEN SATURATION: 94 % | WEIGHT: 162.94 LBS | HEIGHT: 71 IN | BODY MASS INDEX: 22.81 KG/M2 | SYSTOLIC BLOOD PRESSURE: 127 MMHG | HEART RATE: 126 BPM

## 2025-05-22 DIAGNOSIS — G62.9 PERIPHERAL POLYNEUROPATHY: ICD-10-CM

## 2025-05-22 DIAGNOSIS — D63.8 ANEMIA, CHRONIC DISEASE: ICD-10-CM

## 2025-05-22 DIAGNOSIS — Z12.5 ENCOUNTER FOR SCREENING FOR MALIGNANT NEOPLASM OF PROSTATE: ICD-10-CM

## 2025-05-22 DIAGNOSIS — Z51.5 ENCOUNTER FOR PALLIATIVE CARE: ICD-10-CM

## 2025-05-22 DIAGNOSIS — C34.32 ADENOCARCINOMA OF LOWER LOBE OF LEFT LUNG: ICD-10-CM

## 2025-05-22 DIAGNOSIS — Z79.899 OTHER LONG TERM (CURRENT) DRUG THERAPY: ICD-10-CM

## 2025-05-22 DIAGNOSIS — C79.51 METASTASIS TO BONE: Primary | ICD-10-CM

## 2025-05-22 DIAGNOSIS — D49.9 NEOPLASM: ICD-10-CM

## 2025-05-22 DIAGNOSIS — G89.3 NEOPLASM RELATED PAIN: ICD-10-CM

## 2025-05-22 LAB
ABSOLUTE EOSINOPHIL (OHS): 0.12 K/UL
ABSOLUTE MONOCYTE (OHS): 1.27 K/UL (ref 0.3–1)
ABSOLUTE NEUTROPHIL COUNT (OHS): 16.21 K/UL (ref 1.8–7.7)
ALBUMIN SERPL BCP-MCNC: 2.4 G/DL (ref 3.5–5.2)
ALP SERPL-CCNC: 77 UNIT/L (ref 40–150)
ALT SERPL W/O P-5'-P-CCNC: 32 UNIT/L (ref 10–44)
ANION GAP (OHS): 13 MMOL/L (ref 8–16)
AST SERPL-CCNC: 30 UNIT/L (ref 11–45)
BASOPHILS # BLD AUTO: 0.04 K/UL
BASOPHILS NFR BLD AUTO: 0.2 %
BILIRUB DIRECT SERPL-MCNC: 0.2 MG/DL (ref 0.1–0.3)
BILIRUB SERPL-MCNC: 0.2 MG/DL (ref 0.1–1)
BUN SERPL-MCNC: 14 MG/DL (ref 8–23)
CALCIUM SERPL-MCNC: 9.7 MG/DL (ref 8.7–10.5)
CHLORIDE SERPL-SCNC: 100 MMOL/L (ref 95–110)
CHOLEST SERPL-MCNC: 138 MG/DL (ref 120–199)
CHOLEST/HDLC SERPL: 3.1 {RATIO} (ref 2–5)
CO2 SERPL-SCNC: 26 MMOL/L (ref 23–29)
CREAT SERPL-MCNC: 0.7 MG/DL (ref 0.5–1.4)
EAG (OHS): 108 MG/DL (ref 68–131)
ERYTHROCYTE [DISTWIDTH] IN BLOOD BY AUTOMATED COUNT: 21.8 % (ref 11.5–14.5)
FERRITIN SERPL-MCNC: 819 NG/ML (ref 20–300)
FOLATE SERPL-MCNC: 6.7 NG/ML (ref 4–24)
GFR SERPLBLD CREATININE-BSD FMLA CKD-EPI: >60 ML/MIN/1.73/M2
GLUCOSE SERPL-MCNC: 109 MG/DL (ref 70–110)
HBA1C MFR BLD: 5.4 % (ref 4–5.6)
HCT VFR BLD AUTO: 32.6 % (ref 40–54)
HDLC SERPL-MCNC: 44 MG/DL (ref 40–75)
HDLC SERPL: 31.9 % (ref 20–50)
HGB BLD-MCNC: 9.9 GM/DL (ref 14–18)
IMM GRANULOCYTES # BLD AUTO: 0.11 K/UL (ref 0–0.04)
IMM GRANULOCYTES NFR BLD AUTO: 0.5 % (ref 0–0.5)
INR PPP: 1.1 (ref 0.8–1.2)
IRON SATN MFR SERPL: 16 % (ref 20–50)
IRON SERPL-MCNC: 36 UG/DL (ref 45–160)
LDLC SERPL CALC-MCNC: 77.8 MG/DL (ref 63–159)
LYMPHOCYTES # BLD AUTO: 2.34 K/UL (ref 1–4.8)
MCH RBC QN AUTO: 23.9 PG (ref 27–31)
MCHC RBC AUTO-ENTMCNC: 30.4 G/DL (ref 32–36)
MCV RBC AUTO: 79 FL (ref 82–98)
NONHDLC SERPL-MCNC: 94 MG/DL
NUCLEATED RBC (/100WBC) (OHS): 0 /100 WBC
PLATELET # BLD AUTO: 492 K/UL (ref 150–450)
PMV BLD AUTO: 9.3 FL (ref 9.2–12.9)
POTASSIUM SERPL-SCNC: 3.6 MMOL/L (ref 3.5–5.1)
PROT SERPL-MCNC: 8.5 GM/DL (ref 6–8.4)
PROTHROMBIN TIME: 12.4 SECONDS (ref 9–12.5)
PSA SERPL-MCNC: 7.02 NG/ML
RBC # BLD AUTO: 4.15 M/UL (ref 4.6–6.2)
RELATIVE EOSINOPHIL (OHS): 0.6 %
RELATIVE LYMPHOCYTE (OHS): 11.6 % (ref 18–48)
RELATIVE MONOCYTE (OHS): 6.3 % (ref 4–15)
RELATIVE NEUTROPHIL (OHS): 80.8 % (ref 38–73)
SODIUM SERPL-SCNC: 139 MMOL/L (ref 136–145)
T4 FREE SERPL-MCNC: 1.05 NG/DL (ref 0.71–1.51)
TIBC SERPL-MCNC: 225 UG/DL (ref 250–450)
TRANSFERRIN SERPL-MCNC: 152 MG/DL (ref 200–375)
TRIGL SERPL-MCNC: 81 MG/DL (ref 30–150)
TSH SERPL-ACNC: 5.97 UIU/ML (ref 0.4–4)
VIT B12 SERPL-MCNC: 1579 PG/ML (ref 210–950)
WBC # BLD AUTO: 20.09 K/UL (ref 3.9–12.7)

## 2025-05-22 PROCEDURE — 82607 VITAMIN B-12: CPT

## 2025-05-22 PROCEDURE — 85610 PROTHROMBIN TIME: CPT

## 2025-05-22 PROCEDURE — 84439 ASSAY OF FREE THYROXINE: CPT

## 2025-05-22 PROCEDURE — 84443 ASSAY THYROID STIM HORMONE: CPT

## 2025-05-22 PROCEDURE — 84153 ASSAY OF PSA TOTAL: CPT

## 2025-05-22 PROCEDURE — 82248 BILIRUBIN DIRECT: CPT

## 2025-05-22 PROCEDURE — 84466 ASSAY OF TRANSFERRIN: CPT

## 2025-05-22 PROCEDURE — 99999 PR PBB SHADOW E&M-EST. PATIENT-LVL V: CPT | Mod: PBBFAC,,, | Performed by: NURSE PRACTITIONER

## 2025-05-22 PROCEDURE — 36415 COLL VENOUS BLD VENIPUNCTURE: CPT

## 2025-05-22 PROCEDURE — 82465 ASSAY BLD/SERUM CHOLESTEROL: CPT

## 2025-05-22 PROCEDURE — 83036 HEMOGLOBIN GLYCOSYLATED A1C: CPT

## 2025-05-22 PROCEDURE — 82728 ASSAY OF FERRITIN: CPT

## 2025-05-22 PROCEDURE — 80053 COMPREHEN METABOLIC PANEL: CPT

## 2025-05-22 PROCEDURE — 85025 COMPLETE CBC W/AUTO DIFF WBC: CPT

## 2025-05-22 PROCEDURE — 82746 ASSAY OF FOLIC ACID SERUM: CPT

## 2025-05-22 PROCEDURE — 99497 ADVNCD CARE PLAN 30 MIN: CPT | Mod: PBBFAC | Performed by: NURSE PRACTITIONER

## 2025-05-22 PROCEDURE — 99215 OFFICE O/P EST HI 40 MIN: CPT | Mod: PBBFAC | Performed by: NURSE PRACTITIONER

## 2025-05-22 NOTE — ASSESSMENT & PLAN NOTE
Pelvis, thoracic spine and scapula lesions noted 11/2024 CT.   Oncologist follow up scheduled 5/26/25.  He would like mediport removed if no longer needed due to irritation to site.

## 2025-05-22 NOTE — ASSESSMENT & PLAN NOTE
Discussed management options for cancer-related pain.  Pain well-managed with Percocet PRN. He does not need refill at this time.  Pain contract reviewed in detail and signed.   Discussed risk of opioid induced constipation.   Recommendations made for bowel management plan.

## 2025-05-22 NOTE — PROGRESS NOTES
"Palliative Medicine Clinic Note    Chief Complaint:   Chief Complaint   Patient presents with    Follow-up    Pain And Symptom Management   ACP     ASSESSMENT/PLAN:      Plan/Recommendations:  1. Metastasis to bone  Assessment & Plan:  Pelvis, thoracic spine and scapula lesions noted 11/2024 CT.   Oncologist follow up scheduled 5/26/25.  He would like mediport removed if no longer needed due to irritation to site.          2. Encounter for palliative care  Assessment & Plan:  Impression:  Symptoms:  back pain, cough but sx presently managed.  Medicolegal: Mr Walton has decision making capacity.  He wife Yesenia is HC POA; documents completed today. We completed living will today as well.  Psychosocial:  support system consists of brother, sister, spouse, daughter  Prognostication: 6 months or less is reasonable. We discussed this today; he and his family understand decline is expected overall since he widespread cancer and no longer receiving cancer-directed tx.  Understanding of disease and Illness Trajectory: Patient  has  adequate understanding of his illness, they can benefit from continued education on what to expect in the future.  Goals of care:  Would like whatever treatment he can take to "get back to himself." He would like cancer tx if able to receive.     Summary and recommendations: supportive care unless more aggressive treatment options are available.        3. Neoplasm related pain  Assessment & Plan:  Discussed management options for cancer-related pain.  Pain well-managed with Percocet PRN. He does not need refill at this time.  Pain contract reviewed in detail and signed.   Discussed risk of opioid induced constipation.   Recommendations made for bowel management plan.             Advance Care Planning   Advance Directives:   Living Will: Yes        Copy on chart: Yes    [place]: No (written information and discussion today)    Do Not Resuscitate Status: No    Medical Power of : Yes  "   Agent's Name:  Yesenia Walton - spouse    Decision Making:  Patient answered questions  Goals of Care: What is most important right now is to focus on curative/life-prolongation (regardless of treatment burdens). Accordingly, we have decided that the best plan to meet the patient's goals includes continuing with treatment.           Thank you for involving me in the care of this patient.     Follow up in about 4 weeks (around 6/19/2025).    Future Appointments   Date Time Provider Department Center   5/26/2025 11:30 AM Rudi Granados IV, MD [facility] HEM ONC [facility]   5/30/2025 10:30 AM [facility] [id number] [facility] IR [facility]   5/30/2025 11:00 AM [facility] NM2 INFINIA 400LB LIMIT [facility] [address]   5/30/2025 12:00 PM [facility] NM2 INFINIA 400LB LIMIT [facility] [address]   5/30/2025 12:45 PM [facility] NM2 INFINIA 400LB LIMIT [facility] [address]   6/17/2025  2:00 PM Analisa Love MD [facility] INT [facility]   7/8/2025 10:45 AM Raul Lagunas MD [facility] PULMSVC [facility]        SUBJECTIVE:      History of Present Illness / Interval History:  Ernie Walton is 69 y.o. 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  He began seeing Dr. Granados for 2nd opinion.  He was having difficulty tolerating his cancer treatment.  On May 12th Dr. Granados recommended holding cancer directed therapy and hospice care. Mr Walton does not believe he is ready for hospice. Medical history is also significant for scleroderma, hypertension, hyperlipidemia, BPH, nephrolithiasis with recurrent UTIs and degenerative disc disease.       Previously tx in [place] and [place] [facility].  Was not a candidate for trials and no tx recommended.    Presents to [facility] for physical symptoms, advance care planning,, and additional support.       5/22/25  History obtained from: patient, family, and medical record.    [facility] Prescribed one  week of dexamethasone and oxycodone PRN for pain related to cancer. ACP and GOC introduced then.     Saw Dr Jackson to [facility] care 5/20/25.     Took 6/7 dexamethasone.   Taking Percocet once each AM   Mycophenolate difficult to swallow. Prescribed by rheum in [place]. Has appt to f/u in June.     Abdominal pain is better. Continues with cough. Lots of white sputum but less than previous.   Would like to get rid of CP so he can exercise.   Feels like ROSALES is better since stopping ctx. Fatigue is improving.     HR elevated since December. Pt attributes this to chemotherapy.     Has appt with Dr Granados Monday.         Today we discussed symptom management, goals of care, and advanced care planning.        ROS:  Review of Systems    Palliative Exam    Medications:  Current Medications[1]    External  database queried on 05/22/2025  by Danica NAJERA :     N/A    Review of patient's allergies indicates:   Allergen Reactions    Milk containing products (dairy) Nausea And Vomiting     Per pt milked when used with cooking causes pt to vomit        OBJECTIVE:   Physical Exam:  Vitals: Temp: 97 °F (36.1 °C) (05/22/25 1008)  Pulse: (!) 126 (05/22/25 1008)  BP: 127/82 (05/22/25 1008)  SpO2: (!) 94 % (05/22/25 1008)    Physical Exam    Wt Readings from Last 3 Encounters:   05/22/25 1008 73.9 kg (162 lb 14.7 oz)   05/20/25 1550 75 kg (165 lb 5.5 oz)   05/15/25 1052 75.1 kg (165 lb 9.1 oz)     BP Readings from Last 3 Encounters:   05/22/25 127/82   05/20/25 110/68   05/15/25 118/82       Labs:  Lab Results   Component Value Date    WBC 20.09 (H) 05/22/2025    HGB 9.9 (L) 05/22/2025    HCT 32.6 (L) 05/22/2025    MCV 79 (L) 05/22/2025     (H) 05/22/2025           Imaging:       I spent a total of 38 minutes on the day of the visit. This includes face to face time in discussion of goals of care, symptom assessment, coordination of care and emotional support.  This also includes non-face to face time preparing to see  the patient (eg, review of tests/imaging), obtaining and/or reviewing separately obtained history, documenting clinical information in the electronic or other health record, independently interpreting results and communicating results to the patient/family/caregiver, or care coordinator.     Additional 26 min time spent on a voluntary advance care planning and /or goals of care discussion, providing emotional support, formulating and communicating prognosis and exploring burden/benefit of various approaches of treatment.       Danica Cook NP         [1]   Current Outpatient Medications:     amoxicillin (AMOXIL) 500 MG capsule, TAKE 1 CAPSULE BY MOUTH THREE TIMES DAILY UNTIL ALL TAKEN, Disp: , Rfl:     benzonatate (TESSALON) 100 MG capsule, Take 1 capsule (100 mg total) by mouth 3 (three) times daily as needed for Cough., Disp: 90 capsule, Rfl: 2    chlorhexidine (PERIDEX) 0.12 % solution, RINSE MOUTH WITH 15 ML ONCE DAILY IN THE EVENING, Disp: , Rfl:     cyanocobalamin 1,000 mcg/mL injection, Inject 1,000 mcg into the muscle every 30 days., Disp: , Rfl:     dexAMETHasone (DECADRON) 4 MG Tab, Take 1 tablet (4 mg total) by mouth once daily. for 7 days, Disp: 7 tablet, Rfl: 0    diphenhydrAMINE (BENADRYL) 50 MG capsule, Take 50mg by mouth 1 hour before contrast administration., Disp: 1 capsule, Rfl: 0    diphenhydrAMINE-aluminum-magnesium hydroxide-simethicone-LIDOcaine viscous HCl 2%, Swish and spit 15 mLs every 4 (four) hours as needed., Disp: 1 each, Rfl: 2    DULoxetine (CYMBALTA) 60 MG capsule, Take 1 capsule (60 mg total) by mouth once daily. for 120 doses, Disp: 30 capsule, Rfl: 3    fluticasone propionate (FLONASE) 50 mcg/actuation nasal spray, 1 spray by Each Nostril route once daily., Disp: , Rfl:     Lactobacillus rhamnosus GG (CULTURELLE) 10 billion cell capsule, Take 1 capsule by mouth every morning., Disp: , Rfl:     LIDOcaine (LIDODERM) 5 %, Place 1 patch onto the skin once daily. Remove & Discard  patch within 12 hours or as directed by MD, Disp: 10 patch, Rfl: 0    LIDOcaine 5 % Crea, SMARTSIG:Sparingly Topical 3 Times Daily, Disp: , Rfl:     LIDOcaine viscous HCl 2% (XYLOCAINE) 2 % Soln, , Disp: , Rfl:     magic mouthwash diphen/antac/lidoc, Swish and spit 15 mLs every 4 (four) hours as needed., Disp: 450 mL, Rfl: 2    mupirocin (BACTROBAN) 2 % ointment, Apply 1 Application topically., Disp: , Rfl:     mv, min #36-iron,carbonyl-FA (GERITOL COMPLETE) 16 mg iron- 0.38 mg Tab, Take 1 tablet by mouth once daily., Disp: , Rfl:     nintedanib (OFEV) 100 mg Cap, Take 100 mg by mouth 2 (two) times daily., Disp: , Rfl:     omega 3-dha-epa-fish oil 1,000 mg (120 mg-180 mg) Cap, Take 1,000 mg by mouth., Disp: , Rfl:     ondansetron (ZOFRAN-ODT) 4 MG TbDL, Take 4 mg by mouth., Disp: , Rfl:     oxyCODONE-acetaminophen (PERCOCET)  mg per tablet, Take 1 tablet by mouth every 4 (four) hours as needed for Pain., Disp: 42 tablet, Rfl: 0    pantoprazole (PROTONIX) 40 MG tablet, Take 40 mg by mouth., Disp: , Rfl:     phenazopyridine (PYRIDIUM) 200 MG tablet, Take 200 mg by mouth 3 (three) times daily as needed., Disp: , Rfl:     pregabalin (LYRICA) 75 MG capsule, Take 75 mg by mouth 2 (two) times daily., Disp: , Rfl:     raloxifene (EVISTA) 60 mg tablet, Take 1 tablet by mouth once daily., Disp: , Rfl:     sildenafil (REVATIO) 20 mg Tab, Take 1 tablet by mouth 3 (three) times daily., Disp: , Rfl:     triamcinolone acetonide 0.1% (KENALOG) 0.1 % ointment, APPLY OINTMENT TOPICALLY TO AFFECTED AREA TWICE DAILY FOR 30 DAYS, FOR LEFT RING FINGER., Disp: , Rfl:

## 2025-05-22 NOTE — PATIENT INSTRUCTIONS
Measures to improve Opioid Induced Constipation:  Lifestyle Changes  Measures to prevent constipation include:  eating an adequate fiber in the diet  drinking ample water  exercising to encourage motility of the bowels  limiting intake of other painkillers  using a laxative.     Dietary  There are many fiber-rich foods that one can eat to treat constipation.  Fruits like apples, bananas, prunes, pears, raspberries, and vegetables like string beans, broccoli, spinach, kale, squash, lentils, peas, and beans are often recommended.   One can also eat almost any type of bran products (usually cereals) and nuts.   When eating foods with fiber, it is important not to consume more than 25 to 30 grams per day otherwise it can lead to a bloating sensation.     Laxative choices include polyethylene glycol (Miralax), senna (Sennakot), bisacodyl (Dulcolax) and milk of magnesia. This is my order of preference.

## 2025-05-22 NOTE — ASSESSMENT & PLAN NOTE
"Impression:  Symptoms:  back pain, cough but sx presently managed.  Medicolegal: Mr Walton has decision making capacity.  He wife Yesenia is HC POA; documents completed today. We completed living will today as well.  Psychosocial:  support system consists of brother, sister, spouse, daughter  Prognostication: 6 months or less is reasonable. We discussed this today; he and his family understand decline is expected overall since he widespread cancer and no longer receiving cancer-directed tx.  Understanding of disease and Illness Trajectory: Patient  has  adequate understanding of his illness, they can benefit from continued education on what to expect in the future.  Goals of care:  Would like whatever treatment he can take to "get back to himself." He would like cancer tx if able to receive.     Summary and recommendations: supportive care unless more aggressive treatment options are available.    "

## 2025-05-23 ENCOUNTER — RESULTS FOLLOW-UP (OUTPATIENT)
Dept: INTERNAL MEDICINE | Facility: CLINIC | Age: 70
End: 2025-05-23

## 2025-05-23 DIAGNOSIS — R97.20 ELEVATED PSA, LESS THAN 10 NG/ML: Primary | ICD-10-CM

## 2025-05-26 ENCOUNTER — OFFICE VISIT (OUTPATIENT)
Dept: HEMATOLOGY/ONCOLOGY | Facility: CLINIC | Age: 70
End: 2025-05-26
Payer: MEDICARE

## 2025-05-26 VITALS
HEART RATE: 144 BPM | OXYGEN SATURATION: 92 % | SYSTOLIC BLOOD PRESSURE: 112 MMHG | TEMPERATURE: 98 F | DIASTOLIC BLOOD PRESSURE: 68 MMHG | BODY MASS INDEX: 22.53 KG/M2 | HEIGHT: 71 IN | WEIGHT: 160.94 LBS

## 2025-05-26 DIAGNOSIS — C79.51 METASTASIS TO BONE: Primary | ICD-10-CM

## 2025-05-26 DIAGNOSIS — T45.1X5A CHEMOTHERAPY-INDUCED PERIPHERAL NEUROPATHY: ICD-10-CM

## 2025-05-26 DIAGNOSIS — C77.1 SECONDARY MALIGNANT NEOPLASM OF INTRATHORACIC LYMPH NODES: ICD-10-CM

## 2025-05-26 DIAGNOSIS — G62.0 CHEMOTHERAPY-INDUCED PERIPHERAL NEUROPATHY: ICD-10-CM

## 2025-05-26 DIAGNOSIS — G89.3 NEOPLASM RELATED PAIN: ICD-10-CM

## 2025-05-26 DIAGNOSIS — C78.7 SECONDARY MALIGNANT NEOPLASM OF LIVER: ICD-10-CM

## 2025-05-26 DIAGNOSIS — C34.32 ADENOCARCINOMA OF LOWER LOBE OF LEFT LUNG: Primary | ICD-10-CM

## 2025-05-26 DIAGNOSIS — M34.9 SCLERODERMA: ICD-10-CM

## 2025-05-26 DIAGNOSIS — R53.81 DECLINING PERFORMANCE STATUS: ICD-10-CM

## 2025-05-26 DIAGNOSIS — C79.51 SECONDARY MALIGNANT NEOPLASM OF BONE: ICD-10-CM

## 2025-05-26 PROCEDURE — 99215 OFFICE O/P EST HI 40 MIN: CPT | Mod: S$PBB,,, | Performed by: HOSPITALIST

## 2025-05-26 PROCEDURE — G2211 COMPLEX E/M VISIT ADD ON: HCPCS | Mod: S$PBB,,, | Performed by: HOSPITALIST

## 2025-05-26 PROCEDURE — 99214 OFFICE O/P EST MOD 30 MIN: CPT | Mod: PBBFAC | Performed by: HOSPITALIST

## 2025-05-26 PROCEDURE — 99999 PR PBB SHADOW E&M-EST. PATIENT-LVL IV: CPT | Mod: PBBFAC,,, | Performed by: HOSPITALIST

## 2025-05-26 RX ORDER — DEXAMETHASONE 4 MG/1
4 TABLET ORAL DAILY
Qty: 60 TABLET | Refills: 0 | Status: SHIPPED | OUTPATIENT
Start: 2025-05-26 | End: 2025-07-25

## 2025-05-26 NOTE — PROGRESS NOTES
[facility] MEDICAL ONCOLOGY - FOLLOW UP VISIT    Reason for visit: Second opinion, Stage IV Adenocarcinoma of the Lung      Oncology History   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   5/15/2024 Imaging Significant Findings    CT C, Non-Con (f/u after imaging for scleroderma clinical trial workup)  - 2.5 x 2.4 x 1.8 cm LLL nodule (previously 2.2 x 2.2 x 1.8 cm, 3/25/24)  - Patchy ground-glass and nodular opacities adjacent to LLL nodule  - 1.4 cm left hilar lymph node  - Right lower paratracheal lymphadenopathy  - Lytic lesion with erosive changes in T2 vertebral body, new lytic lesions in manubrium, right fourth rib, T3, T7, and T11 vertebral bodies with pathologic fracture of superior endplate of T3 vertebral body     5/15/2024 Procedure    Bronch w/ EBUS  LLL TBBx  - Adenocarcinoma (Positive: TTF-1; Negative: p40)    LLL, Core Biopsy  - Adenocarcinoma    LN  - Positive: Station 11L  - Negative: Station 4R, 7, 4L    Caris NGS / PD-L1      - KEAP1, TP53, STK11 mutated  - ERBB2 negative / 1+  - TMB 7  - TORIE       6/5/2024 Imaging Significant Findings    PET CT  - 2.2 cm LLL mass, SUV 4.8  - 1.6 cm left hilar node, SUV 6.8  - 1.3 cm right lower paratracheal node, SUV 2.2  - Hypermetabolic lytic lesions in multiple bones (C7 transverse process, multiple thoracic, lumbar, and sacral segments, sternum, right scapular body, left scapular angle, several ribs, left ilium, left pubic body, bilateral ischia)     7/5/2024 Imaging Significant Findings    MRI Brain  - LANNY in brain or dura  - Mild enhancement within clivus, cannot rule out bony met     7/8/2024 Initial Diagnosis    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     7/8/2024 Cancer Staged    Staging form: Lung, AJCC 8th Edition  - Clinical: Stage IVB (cT2, cN1, pM1c)     8/9/2024 Imaging Significant Findings    MRI C/T/L Spine  Diffuse osseous metastasis throughout the visualized spine without any evidence of significant  compression fracture or retropulsion with involvement of the vertebral bodies and posterior elements at multiple levels.   No evidence of cervical/thoracic spinal cord compression with impingement of the ventral aspect of the cord from adjacent disc disease at multiple levels as discussed above.   Discal disease with foraminal stenosis at multiple levels throughout the spine with most prominent involvement seen at L4-5 and L5-S1 levels mostly degenerative in nature.   No abnormal intraspinal enhancement with no epidural enhancement noted        9/27/2024 Imaging Significant Findings    PET CT (compared against PET CT from 6/5/24)  1.4 x 1.0 cm LLL nodule (previously 2.0 x 1.3 cm), SUV 6.8 (similar)  New fairly defined opacity adjacent to the primary mass measuring up 1.8. The activity overlying the primary tumor is now confluent with the new opacity, leading to increased extent of activity.   1.2 x 2.0 cm L hilar LN (previously 1.4 x 2.2 cm), SUV 5.2  Numerous FDG-avid bone metastases are noted. Although previously visualized lesions show some areas of improvement, there are multiple new active bone metastases (L4, T8). The pattern suggests initial response to treatment with subsequent progressive disease      11/25/2024 Imaging Significant Findings    11/25/24 -- CT C/A/P  2.9 x 2.0 cm spiculated LLL pulmonary nodule  2.2 x 1.9 cm left hilar lymph node  Dependent pulmonary infiltrates  Extensive osseous metastatic disease (scattered lytic and sclerotic lesions throughout thoracic spine, manubrium, left scapula, pelvis, sacrum, and lumbar spine)     12/23/2024 -  Chemotherapy    Docetaxel/Ramucirumab  21 day cycle  Docetaxel 75 mg/m2  Ramucirumab 10 mg/kg     2/24/2025 Imaging Significant Findings    CT C/A/P  Stable 2.6 x 2.6 LLL nodule, previously 2.5 x 2.7 cm  Stable 1.8 x 1.5 cm left hilar lymph node, previously 2.0 x 1.5 cm  New 1.1 x 0.9 cm right hepatic lobe lesion  Stable scattered areas of osteo sclerotic  disease     3/5/2025 Imaging Significant Findings    MRI Abdomen  1.2 cm right hepatic dome lesion consistent with metastatic disease, no additional liver lesions     5/9/2025 Imaging Significant Findings    CT C/A/P  Mild enlargement of mediastinal lymph nodes (e.g.  1.8 cm right paratracheal lymph node, previously 1.3 cm; 1.6 cm subcarinal lymph node, previously 1.0 cm)  Multiple additional subcentimeter mediastinal lymph nodes unchanged (e.g. 1.7 cm left inferior hilar node)  Mild increase in size of bilateral pleural effusions, left greater than right  Stable 2.8 x 2.1 cm LLL mass  Stable diffuse bilateral mid to lower lung reticular interstitial opacities and ground-glass densities  Less conspicuous 1.2 x 1.0 cm liver lesion, no new liver lesions  Progressive bony destruction of the manubrium with surrounding soft tissue thickening        Previous Workup:   - 6/5/24:  Medical oncology (Dr. Erik Clay), discussed carboplatin/paclitaxel, plan to proceed  - 6/5/24: C1D1 carboplatin/paclitaxel  - 6/26/24: C2D1 carboplatin/paclitaxel    Oncology History    HPI:     Ernie Walton is a 69 y.o. male with pmh significant for systemic sclerosis/scleroderma (dx'd 2020, on mycophenolate mofetil, prednisone, and nintendinab), HTN, HLD, BPH, h/o nephrolithiasis w/ recurrent UTI, and DDD who presents for f/u of the below lung cancer. His primary oncologist has been Dr. Erik Clay, and he has established care with Dr. Holley ([facility] [place] [facility]).    1)  Stage IVB ([id number]) adenocarcinoma of the LLL (PD-L1 TPS 1%, no targetable mutations, STK11/TP53/KEAP1 co-mutations) w/ diffuse osseous disease , initially dx'd 5/15/24, s/p carboplatin/paclitaxel (6/5/24 - 9/17/24), radiographic evidence of progression in the left lung and skeleton, currently on 2L docetaxel/ramucirumab (12/23/24-present;  starting C5 for mucositis, fatigue, anorexia)    Last clinic 5/12/25, hold further cancer directed therapy given progression and performance status,  "social work to discuss comfort care versus hospice, RTC pending the above.    Interval History:  5/15/25: Palliative care  5/22/25: Palliative care    The pt says that he completed his steroids this morning. He says that these helped his pain considerably, but that he was only given 7 tabs. The pain is "across my chest, all down the middle of my back, the spine in the top area". He is also using oxycodone for this. He feels that his appetite has also improved while on these steroids.     He continues to have a cough productive of "white cold". He says that the cough keeps him awake at night, and says that he is using the bathroom ~5x nightly.     He denies any further nausea or vomiting.     He says that he is still dyspneic with exertion. He says that he is still weak.     He says that he has been trying to be more active. He says that he sits outside with his brother more. He says that he did laundry one day.     Previously, his day was described as:   "When asked what a typical day looks like, he says that he has breakfast around 08:00, then he watches TV, he "lays around on the sofa" stating that he is trying to get his hands warm. He then will eat lunch, and then watch more TV. He says that, if the weather hits 77, he will go to sit outside and enjoy the weather. He says that he isn't eating much of dinner (last night, had yogurt in a cup, peaches, and apple sauce). He goes to bed after eating dinner. He notes that, when he initially came to  to stay with family, he would vacuum and wash laundry, but says he has not been able to do these things for the past couple of months. He says this is from "chemo", saying it is a "lack of energy, shortness of breath". He confirms that he spends more than 50% of the day sedentary; his sister says "you're lying down more than you're sitting". " He says that this remains a fairly accurate representation, though his sister says that he has "been intentional about doing " "more".     ROS:   As per HPI.       Physical Exam:       There were no vitals taken for this visit.               Physical Exam  Constitutional:       Appearance: Normal appearance.      Comments: Sitting in wheelchair   HENT:      Head: Normocephalic and atraumatic.   Eyes:      Extraocular Movements: Extraocular movements intact.      Conjunctiva/sclera: Conjunctivae normal.      Pupils: Pupils are equal, round, and reactive to light.   Cardiovascular:      Rate and Rhythm: Normal rate and regular rhythm.      Heart sounds: No murmur heard.     No friction rub. No gallop.   Pulmonary:      Effort: Pulmonary effort is normal.      Breath sounds: No wheezing, rhonchi or rales.   Musculoskeletal:         General: Normal range of motion.      Right lower leg: No edema.      Left lower leg: No edema.      Comments: Sclerodactyly evident.     Skin:     General: Skin is warm and dry.      Comments: Dystrophic L thumb nail, desquamation over multiple fingers   Neurological:      Mental Status: He is alert and oriented to person, place, and time.   Psychiatric:         Mood and Affect: Mood normal.         Thought Content: Thought content normal.         Judgment: Judgment normal.           Imaging:    See oncologic history above.     Path:  See oncologic history above.      Assessment and Plan:     Ernie Walton is a 69 y.o. male with pmh significant for systemic sclerosis/scleroderma (dx'd 2020, on mycophenolate mofetil, prednisone, and nintendinab), HTN, HLD, BPH, h/o nephrolithiasis w/ recurrent UTI, and DDD who presents for f/u of the below lung cancer. His primary oncologist has been Dr. Erik Clay, and he has established care with Dr. Holley ([facility]).    1)  Stage IVB ([id number]) adenocarcinoma of the LLL (PD-L1 TPS 1%, no targetable mutations, STK11/TP53/KEAP1 co-mutations) w/ diffuse osseous disease , initially dx'd 5/15/24, s/p carboplatin/paclitaxel (6/5/24 - 9/17/24), radiographic evidence of progression in the left " lung and skeleton, currently on 2L docetaxel/ramucirumab (12/23/24-present; DR starting C5 for mucositis, fatigue, anorexia)    Stage IVB Adenocarcinoma of the LLL. PD-L1 1%, No Targetable Mutations  ECOG PS 3.  Patient is currently on docetaxel/ramucirumab, treatment complicated by mucositis, fatigue, and decreased appetite (worsening with subsequent doses; improved with C5 dose delay and subsequent dose reduction).  See note from 5/12/25 regarding most recent imaging and treatment decisions. Today (5/26/25), these considerations were reiterated, and the patient endorsed his understanding and acceptance that he is not a candidate for any further cancer-directed therapy. After discussing with his family and with palliative care, he would like to proceed w/ comfort care rather than enroll in hospice; he has already established care with palliative care, who is working to manage his symptoms. We discussed that I remain available for any questions or concerns going forward, but that the pt will only return PRN to the oncology clinic. All questions were answered to apparent satisfaction    PLAN  Hold further cancer-directed therapy at this time  Continue to follow with palliative care  Message sent to cancel y-90 ablation  RTC to medical oncology PRN      Peripheral Neuropathy  See previous note for description. Given timeline, most consistent with chemotherapy induced PN. Pt previously on pregabalin to minimal effect, transitioned to duloxetine. Pt endorses significant improvement since starting both duloxetine and starting acupuncture, noting he is able to feel acupuncture in parts of his feet which were previously insensate.   Continue duloxetine 60 mg daily  Continue acupuncture      Bone Mets  Cancer Pain  See previous notes for further information. Pt did not receive dental clearance.   Pain management per palliative care.       Scleroderma  Currently under the care of her rheumatologist in [place], on  mycophenolate mofetil, nintendinab, and prednisone.  Active treatment precludes safe utilization of immunotherapy. Discussed with patient's primary rheumatologist Dr. Cordero, who both agreed with holding immunotherapy as well as with proceeding with ramucirumab.   F/u w/ Dr. Cordero     The above information has been reviewed with the patient and all questions have been answered to their apparent satisfaction.  They understand that they can call the clinic with any questions.    Rudi Granados MD  Hematology/Oncology  [facility]      Route Chart for Scheduling  Med Onc Route Chart for Scheduling      Disclaimer: This note was prepared using voice recognition system and is likely to have sound alike errors.  Please call me with any questions.

## 2025-05-26 NOTE — PROGRESS NOTES
Port removal requested (pt describes discomfort, and will not be receiving further cancer-directed therapy).

## 2025-05-27 ENCOUNTER — TELEPHONE (OUTPATIENT)
Dept: PAIN MEDICINE | Facility: CLINIC | Age: 70
End: 2025-05-27
Payer: MEDICARE

## 2025-05-27 DIAGNOSIS — C34.32 ADENOCARCINOMA OF LOWER LOBE OF LEFT LUNG: Primary | ICD-10-CM

## 2025-05-27 NOTE — TELEPHONE ENCOUNTER
----- Message from Uzma sent at 5/27/2025  2:54 PM CDT -----  Contact: Patient, [phone]  .[email or web address] Patient is calling for Medical Advice regarding: Calling to reschedule his appointment. Please call him. Thanks.How long has patient had these symptoms:Pharmacy name and phone#:Patient [facility] a call back or [facility]:Comments:Please advise patient replies from provider may take up to 48 hours.

## 2025-05-27 NOTE — TELEPHONE ENCOUNTER
Reached out to patient to schedule appointment from messages. Apt has been made.   Pt understand. All questions answered.     Néstor Yanez Medical Assistant

## 2025-05-28 ENCOUNTER — TELEPHONE (OUTPATIENT)
Dept: PALLIATIVE MEDICINE | Facility: CLINIC | Age: 70
End: 2025-05-28
Payer: MEDICARE

## 2025-05-28 DIAGNOSIS — C34.32 ADENOCARCINOMA OF LOWER LOBE OF LEFT LUNG: ICD-10-CM

## 2025-05-28 DIAGNOSIS — G89.3 NEOPLASM RELATED PAIN: ICD-10-CM

## 2025-05-28 DIAGNOSIS — C79.51 METASTASIS TO BONE: Primary | ICD-10-CM

## 2025-05-28 RX ORDER — OXYCODONE AND ACETAMINOPHEN 10; 325 MG/1; MG/1
1 TABLET ORAL EVERY 6 HOURS PRN
Qty: 120 TABLET | Refills: 0 | Status: SHIPPED | OUTPATIENT
Start: 2025-05-28 | End: 2025-06-27

## 2025-05-28 NOTE — TELEPHONE ENCOUNTER
----- Message from Med Assistant Bustillos sent at 5/28/2025  8:54 AM CDT -----  Contact: Patient, [phone]  Patient would like for you to give him a phone call he would like to discuss why Dr. Bailey referred him to hospice.  ----- Message -----  From: Uzma Pascal  Sent: 5/27/2025   2:54 PM CDT  To: Joey LUNA Staff    .[email or web address] Patient is calling for Medical Advice regarding: Calling to reschedule his appointment. Please call him. Thanks.How long has patient had these symptoms: N/APharmacy name and phone#: N/APatient wants a call back or [facility]: N/AComments: N/APlease advise patient replies from provider may take up to 48 hours.

## 2025-05-28 NOTE — TELEPHONE ENCOUNTER
Continues with constipation.   We discussed mgmt.  Recently resumed dexamethasone.   Looking forward to family reunion 7/4/25 in [place].  Trying to get home to [place].   Percocet is controlling pain. I will refill.

## 2025-05-29 DIAGNOSIS — C34.32 ADENOCARCINOMA OF LOWER LOBE OF LEFT LUNG: Primary | ICD-10-CM

## 2025-05-30 ENCOUNTER — TELEPHONE (OUTPATIENT)
Dept: RADIOLOGY | Facility: HOSPITAL | Age: 70
End: 2025-05-30
Payer: MEDICARE

## 2025-05-30 NOTE — TELEPHONE ENCOUNTER
Interventional Radiology  Scheduled 1:00PM Mediport removal for 6/23/25 w/patient.  Instructed pt. to arrive by 11:30AM to the hospital ([address]/ Entr[address]) off [address] in [place] and check in at Patient Registration located on the first floor.  He must have a ride and NPO after 4:00AM the day of the procedure.  Pt. is to take last dose of fish oil on 6/17/25.  Pt. denies taking ASA or other blood thinners, NSAIDs, or GLP-1/GIP agonists.  Pt. can take morning medications the day of the procedure with a small sip of water.  I confirmed that patient has our direct number [phone] and he verbalized understanding of all instructions.

## 2025-05-30 NOTE — TELEPHONE ENCOUNTER
Interventional Radiology  Called to schedule a mediport removal and LVM for pt. to return my call to [phone].

## 2025-06-18 ENCOUNTER — HOSPITAL ENCOUNTER (EMERGENCY)
Facility: HOSPITAL | Age: 70
Discharge: HOSPICE | End: 2025-06-19
Attending: FAMILY MEDICINE
Payer: MEDICARE

## 2025-06-18 DIAGNOSIS — R29.898 BILATERAL LEG WEAKNESS: ICD-10-CM

## 2025-06-18 DIAGNOSIS — D84.821 IMMUNOCOMPROMISED STATE DUE TO DRUG THERAPY: ICD-10-CM

## 2025-06-18 DIAGNOSIS — C79.51 LUNG CANCER METASTATIC TO BONE: Primary | ICD-10-CM

## 2025-06-18 DIAGNOSIS — Z13.6 SCREENING FOR CARDIOVASCULAR CONDITION: ICD-10-CM

## 2025-06-18 DIAGNOSIS — C34.90 LUNG CANCER METASTATIC TO BONE: Primary | ICD-10-CM

## 2025-06-18 DIAGNOSIS — C34.32 ADENOCARCINOMA OF LOWER LOBE OF LEFT LUNG: ICD-10-CM

## 2025-06-18 DIAGNOSIS — Z79.899 IMMUNOCOMPROMISED STATE DUE TO DRUG THERAPY: ICD-10-CM

## 2025-06-18 DIAGNOSIS — M48.062 SPINAL STENOSIS OF LUMBAR REGION WITH NEUROGENIC CLAUDICATION: ICD-10-CM

## 2025-06-18 DIAGNOSIS — C79.51 METASTASIS TO SPINAL COLUMN: ICD-10-CM

## 2025-06-18 DIAGNOSIS — T80.219A INFECTED VENOUS ACCESS PORT, INITIAL ENCOUNTER: ICD-10-CM

## 2025-06-18 LAB
ABSOLUTE EOSINOPHIL (OHS): 0.01 K/UL
ABSOLUTE MONOCYTE (OHS): 0.54 K/UL (ref 0.3–1)
ABSOLUTE NEUTROPHIL COUNT (OHS): 12.13 K/UL (ref 1.8–7.7)
ALBUMIN SERPL BCP-MCNC: 2.6 G/DL (ref 3.5–5.2)
ALP SERPL-CCNC: 224 UNIT/L (ref 40–150)
ALT SERPL W/O P-5'-P-CCNC: 26 UNIT/L (ref 10–44)
ANION GAP (OHS): 15 MMOL/L (ref 8–16)
ANISOCYTOSIS BLD QL SMEAR: SLIGHT
AST SERPL-CCNC: 73 UNIT/L (ref 11–45)
BASOPHILS # BLD AUTO: 0.01 K/UL
BASOPHILS NFR BLD AUTO: 0.1 %
BILIRUB SERPL-MCNC: 0.3 MG/DL (ref 0.1–1)
BILIRUB UR QL STRIP.AUTO: NEGATIVE
BUN SERPL-MCNC: 14 MG/DL (ref 8–23)
CALCIUM SERPL-MCNC: 9.7 MG/DL (ref 8.7–10.5)
CHLORIDE SERPL-SCNC: 96 MMOL/L (ref 95–110)
CLARITY UR: CLEAR
CO2 SERPL-SCNC: 23 MMOL/L (ref 23–29)
COLOR UR AUTO: YELLOW
CREAT SERPL-MCNC: 0.7 MG/DL (ref 0.5–1.4)
ERYTHROCYTE [DISTWIDTH] IN BLOOD BY AUTOMATED COUNT: 23.2 % (ref 11.5–14.5)
GFR SERPLBLD CREATININE-BSD FMLA CKD-EPI: >60 ML/MIN/1.73/M2
GLUCOSE SERPL-MCNC: 154 MG/DL (ref 70–110)
GLUCOSE UR QL STRIP: NEGATIVE
HCT VFR BLD AUTO: 36.6 % (ref 40–54)
HGB BLD-MCNC: 11.1 GM/DL (ref 14–18)
HGB UR QL STRIP: NEGATIVE
HOLD SPECIMEN: NORMAL
HYPOCHROMIA BLD QL SMEAR: NORMAL
IMM GRANULOCYTES # BLD AUTO: 0.09 K/UL (ref 0–0.04)
IMM GRANULOCYTES NFR BLD AUTO: 0.7 % (ref 0–0.5)
KETONES UR QL STRIP: NEGATIVE
LACTATE SERPL-SCNC: 2.2 MMOL/L (ref 0.5–2.2)
LACTATE SERPL-SCNC: 2.8 MMOL/L (ref 0.5–2.2)
LEUKOCYTE ESTERASE UR QL STRIP: ABNORMAL
LYMPHOCYTES # BLD AUTO: 0.66 K/UL (ref 1–4.8)
MCH RBC QN AUTO: 23.8 PG (ref 27–31)
MCHC RBC AUTO-ENTMCNC: 30.3 G/DL (ref 32–36)
MCV RBC AUTO: 79 FL (ref 82–98)
MICROSCOPIC COMMENT: NORMAL
NITRITE UR QL STRIP: NEGATIVE
NUCLEATED RBC (/100WBC) (OHS): 0 /100 WBC
PH UR STRIP: 6 [PH]
PLATELET # BLD AUTO: 397 K/UL (ref 150–450)
PLATELET BLD QL SMEAR: NORMAL
PMV BLD AUTO: 9.4 FL (ref 9.2–12.9)
POLYCHROMASIA BLD QL SMEAR: NORMAL
POTASSIUM SERPL-SCNC: 4.3 MMOL/L (ref 3.5–5.1)
POTASSIUM SERPL-SCNC: 6.2 MMOL/L (ref 3.5–5.1)
PROT SERPL-MCNC: 9.7 GM/DL (ref 6–8.4)
PROT UR QL STRIP: ABNORMAL
RBC # BLD AUTO: 4.66 M/UL (ref 4.6–6.2)
RELATIVE EOSINOPHIL (OHS): 0.1 %
RELATIVE LYMPHOCYTE (OHS): 4.9 % (ref 18–48)
RELATIVE MONOCYTE (OHS): 4 % (ref 4–15)
RELATIVE NEUTROPHIL (OHS): 90.2 % (ref 38–73)
SCHISTOCYTES BLD QL SMEAR: NORMAL
SODIUM SERPL-SCNC: 134 MMOL/L (ref 136–145)
SP GR UR STRIP: 1.02
UROBILINOGEN UR STRIP-ACNC: NEGATIVE EU/DL
WBC # BLD AUTO: 13.44 K/UL (ref 3.9–12.7)
WBC #/AREA URNS AUTO: 3 /HPF (ref 0–5)

## 2025-06-18 PROCEDURE — 63600175 PHARM REV CODE 636 W HCPCS: Performed by: NURSE PRACTITIONER

## 2025-06-18 PROCEDURE — 93005 ELECTROCARDIOGRAM TRACING: CPT

## 2025-06-18 PROCEDURE — 99291 CRITICAL CARE FIRST HOUR: CPT

## 2025-06-18 PROCEDURE — 93010 ELECTROCARDIOGRAM REPORT: CPT | Mod: ,,, | Performed by: INTERNAL MEDICINE

## 2025-06-18 PROCEDURE — 84132 ASSAY OF SERUM POTASSIUM: CPT | Performed by: FAMILY MEDICINE

## 2025-06-18 PROCEDURE — 87040 BLOOD CULTURE FOR BACTERIA: CPT | Performed by: NURSE PRACTITIONER

## 2025-06-18 PROCEDURE — 85025 COMPLETE CBC W/AUTO DIFF WBC: CPT | Performed by: NURSE PRACTITIONER

## 2025-06-18 PROCEDURE — A9585 GADOBUTROL INJECTION: HCPCS | Performed by: EMERGENCY MEDICINE

## 2025-06-18 PROCEDURE — 96375 TX/PRO/DX INJ NEW DRUG ADDON: CPT

## 2025-06-18 PROCEDURE — 80053 COMPREHEN METABOLIC PANEL: CPT | Performed by: NURSE PRACTITIONER

## 2025-06-18 PROCEDURE — 81003 URINALYSIS AUTO W/O SCOPE: CPT | Performed by: NURSE PRACTITIONER

## 2025-06-18 PROCEDURE — 25500020 PHARM REV CODE 255: Performed by: EMERGENCY MEDICINE

## 2025-06-18 PROCEDURE — 83605 ASSAY OF LACTIC ACID: CPT | Performed by: NURSE PRACTITIONER

## 2025-06-18 RX ORDER — GADOBUTROL 604.72 MG/ML
10 INJECTION INTRAVENOUS
Status: COMPLETED | OUTPATIENT
Start: 2025-06-18 | End: 2025-06-18

## 2025-06-18 RX ORDER — MORPHINE SULFATE 4 MG/ML
4 INJECTION, SOLUTION INTRAMUSCULAR; INTRAVENOUS
Refills: 0 | Status: COMPLETED | OUTPATIENT
Start: 2025-06-18 | End: 2025-06-18

## 2025-06-18 RX ORDER — ONDANSETRON HYDROCHLORIDE 2 MG/ML
4 INJECTION, SOLUTION INTRAVENOUS
Status: COMPLETED | OUTPATIENT
Start: 2025-06-18 | End: 2025-06-18

## 2025-06-18 RX ADMIN — GADOBUTROL 7 ML: 604.72 INJECTION INTRAVENOUS at 09:06

## 2025-06-18 RX ADMIN — MORPHINE SULFATE 4 MG: 4 INJECTION INTRAVENOUS at 05:06

## 2025-06-18 RX ADMIN — ONDANSETRON 4 MG: 2 INJECTION INTRAMUSCULAR; INTRAVENOUS at 05:06

## 2025-06-18 NOTE — ED PROVIDER NOTES
SCRIBE #1 NOTE: I, Joshua Blackwood, am scribing for, and in the presence of, Dayan Roberts MD. I have scribed the HPI/ROS/PEx.    SCRIBE #2 NOTE: I, Chris Hastings, am scribing for, and in the presence of,  Kal Amato MD. I have scribed the remaining portions of the note not scribed by Scribe #1.     SCRIBE #3 NOTE: I, Jaylyn Cade, am scribing for, and in the presence of,  Loreta Camacho MD. I have scribed the remaining portions of the note not scribed by Scribe #2.      History     Chief Complaint   Patient presents with    Numbness     Numbness fro the waist down started this morning. Pt is unable to feel his feet or legs. Unable to walk or stand. Stage 4 lung cancer with mets to the bone     Review of patient's allergies indicates:   Allergen Reactions    Milk containing products (dairy) Nausea And Vomiting     Per pt milked when used with cooking causes pt to vomit         History of Present Illness     HPI    6/18/2025, 5:31 PM  History obtained from the patient and medical records      History of Present Illness: Ernie Walton is a 69 y.o. male patient with a PMHx of bone mets, liver cancer, low back pain, stage 4 lung cancer, peripheral neuropathy, scleroderma, and Raynaud's disease who presents to the Emergency Department for evaluation of bilateral leg paralysis which began earlier this morning after waking up. Pt reports that he can still feel his legs, but he is unable to move them. Pt reports that he was asymptomatic last night. Symptoms are constant and moderate in severity. No mitigating or exacerbating factors reported. Pt reports he has an infected right upper chest port that has been in place since December 2024. Pt reports he is scheduled to have the port removed in 2 days. Patient denies any back pain, urinary or bowel incontinence, emesis, fever, nausea, or numbness. Pt reports he stopped taking mycophenolate due to wanting to take more natural remedies. Pt reports he is still  compliant with all his other medications. Pt reports he takes Oxycodone for chronic pain. No prior Tx specified.  No further complaints or concerns at this time.       Arrival mode: Personal Transportation    PCP: Radha Jackson MD        Past Medical History:  Past Medical History:   Diagnosis Date    Bone cancer     DDD (degenerative disc disease), lumbosacral     Liver cancer     Low back pain     Lung cancer     Scleroderma        Past Surgical History:  Past Surgical History:   Procedure Laterality Date    LITHOTRIPSY      LUNG BIOPSY           Family History:  No family history on file.    Social History:  Social History     Tobacco Use    Smoking status: Former     Types: Cigarettes     Passive exposure: Never    Smokeless tobacco: Never   Substance and Sexual Activity    Alcohol use: Not Currently    Drug use: Not Currently    Sexual activity: Not on file        Review of Systems     Review of Systems   Constitutional:  Negative for fever.   HENT:  Negative for sore throat.    Respiratory:  Negative for shortness of breath.    Cardiovascular:  Negative for chest pain.   Gastrointestinal:  Negative for diarrhea, nausea and vomiting.        (-) bowel incontinence    Genitourinary:  Negative for dysuria and enuresis.   Musculoskeletal:  Negative for back pain.   Skin:  Negative for rash.   Neurological:  Negative for weakness and numbness.        (+) BLE paralysis    Hematological:  Does not bruise/bleed easily.   All other systems reviewed and are negative.     Physical Exam     Initial Vitals [06/18/25 1448]   BP Pulse Resp Temp SpO2   133/73 (!) 116 18 98.4 °F (36.9 °C) 99 %      MAP       --          Physical Exam  Nursing Notes and Vital Signs Reviewed.  Constitutional: Patient is in no apparent distress. Well-developed and well-nourished.  Head: Atraumatic. Normocephalic.  Eyes: PERRL. EOM intact. Conjunctivae are not pale. No scleral icterus.  ENT: Mucous membranes are moist. Oropharynx is clear and  symmetric.    Neck: Supple. Full ROM. No lymphadenopathy.  Cardiovascular: Regular rate. Regular rhythm. No murmurs, rubs, or gallops. Distal pulses are 2+ and symmetric. Dorsalis pedis pulse is good.  Pulmonary/Chest: No respiratory distress. Clear to auscultation bilaterally. No wheezing or rales. R upper chest port noted. Port is erythematous and tender to palpation. No drainage noted.  Abdominal: Soft and non-distended. There is no tenderness.  No rebound, guarding, or rigidity. Good bowel sounds.  Genitourinary: No CVA tenderness.  Rectal:  No tenderness.  No masses.  No hemorrhoids. Some rectal tone noted.     Musculoskeletal: No movement in BLE. Full ROM in BUE. No obvious deformities. No edema. No calf tenderness. Lumbar spinal tenderness.   Skin: Warm and dry.  Neurological:  Alert, awake, and appropriate.  Normal speech. Good sensation to BLE.  Psychiatric: Normal affect. Good eye contact. Appropriate in content.     ED Course   Critical Care    Date/Time: 6/19/2025 11:50 AM    Performed by: Loreta Camacho MD  Authorized by: Loreta Camacho MD  Direct patient critical care time: 45 minutes  Additional history critical care time: 10 minutes  Ordering / reviewing critical care time: 10 minutes  Documentation critical care time: 10 minutes  Consulting other physicians critical care time: 10 minutes  Consult with family critical care time: 10 minutes  Total critical care time (exclusive of procedural time) : 95 minutes  Critical care was necessary to treat or prevent imminent or life-threatening deterioration of the following conditions: CNS failure or compromise, renal failure, metabolic crisis, dehydration and sepsis.  Critical care was time spent personally by me on the following activities: blood draw for specimens, development of treatment plan with patient or surrogate, discussions with primary provider, discussions with consultants, interpretation of cardiac output measurements, examination of  patient, evaluation of patient's response to treatment, obtaining history from patient or surrogate, ordering and performing treatments and interventions, ordering and review of laboratory studies, ordering and review of radiographic studies, pulse oximetry, re-evaluation of patient's condition and review of old charts.        ED Vital Signs:  Vitals:    06/19/25 0500 06/19/25 0532 06/19/25 0546 06/19/25 0700   BP: 124/66 122/67 122/71 113/64   Pulse: 92 107 99 94   Resp: 18 20 (!) 23 17   Temp:       TempSrc:       SpO2: 97% 98% 99% 97%   Weight:       Height:        06/19/25 0800 06/19/25 1030 06/19/25 1035 06/19/25 1040   BP: 130/63 114/66 123/76 129/75   Pulse: 97 83 88 95   Resp: 20 18 18 (!) 21   Temp:       TempSrc:       SpO2: 99% 95% 95% 97%   Weight:       Height:        06/19/25 1045 06/19/25 1050 06/19/25 1056 06/19/25 1100   BP: (!) 129/90 137/82 135/87 131/85   Pulse: 92 92 94 91   Resp: 20 20 16 16   Temp:       TempSrc:       SpO2: 98% 97% 98% 98%   Weight:       Height:        06/19/25 1140 06/19/25 1200 06/19/25 1302   BP: (!) 145/70 134/65 (!) 147/77   Pulse: 99 99 103   Resp: 19 20 20   Temp:      TempSrc:      SpO2: 97% 95% 98%   Weight:      Height:          Abnormal Lab Results:  Labs Reviewed   COMPREHENSIVE METABOLIC PANEL - Abnormal       Result Value    Sodium 134 (*)     Potassium 6.2 (*)     Chloride 96      CO2 23      Glucose 154 (*)     BUN 14      Creatinine 0.7      Calcium 9.7      Protein Total 9.7 (*)     Albumin 2.6 (*)     Bilirubin Total 0.3       (*)     AST 73 (*)     ALT 26      Anion Gap 15      eGFR >60     LACTIC ACID, PLASMA - Abnormal    Lactic Acid Level 2.8 (*)     Narrative:     Falsely low lactic acid results can be found in samples containing >=13.0 mg/dL total bilirubin and/or >=3.5 mg/dL direct bilirubin.    URINALYSIS, REFLEX TO URINE CULTURE - Abnormal    Color, UA Yellow      Appearance, UA Clear      pH, UA 6.0      Spec Grav UA 1.020      Protein, UA  Trace (*)     Glucose, UA Negative      Ketones, UA Negative      Bilirubin, UA Negative      Blood, UA Negative      Nitrites, UA Negative      Urobilinogen, UA Negative      Leukocyte Esterase, UA 1+ (*)    CBC WITH DIFFERENTIAL - Abnormal    WBC 13.44 (*)     RBC 4.66      HGB 11.1 (*)     HCT 36.6 (*)     MCV 79 (*)     MCH 23.8 (*)     MCHC 30.3 (*)     RDW 23.2 (*)     Platelet Count 397      MPV 9.4      Nucleated RBC 0      Neut % 90.2 (*)     Lymph % 4.9 (*)     Mono % 4.0      Eos % 0.1      Basophil % 0.1      Imm Grans % 0.7 (*)     Neut # 12.13 (*)     Lymph # 0.66 (*)     Mono # 0.54      Eos # 0.01      Baso # 0.01      Imm Grans # 0.09 (*)    CULTURE, BLOOD - Normal    Blood Culture No Growth After 96 hours     CULTURE, BLOOD - Normal    Blood Culture No Growth After 96 hours     LACTIC ACID, PLASMA - Normal    Lactic Acid Level 2.2      Narrative:     Falsely low lactic acid results can be found in samples containing >=13.0 mg/dL total bilirubin and/or >=3.5 mg/dL direct bilirubin.    POTASSIUM - Normal    Potassium 4.3     CBC W/ AUTO DIFFERENTIAL    Narrative:     The following orders were created for panel order CBC auto differential.  Procedure                               Abnormality         Status                     ---------                               -----------         ------                     CBC with Differential[[id number]]       Abnormal            Final result                 Please view results for these tests on the individual orders.   MORPHOLOGY    Platelet Estimate Appears Normal      Fragmented Cells Occasional      Anisocytosis Slight      Polychromasia Occasional      Hypochromia Occasional     GREY TOP URINE HOLD    Extra Tube Hold for add-ons.     URINALYSIS MICROSCOPIC    WBC, UA 3      Microscopic Comment            All Lab Results:  Results for orders placed or performed during the hospital encounter of 06/18/25   Blood culture x two cultures. Draw prior to  antibiotics.    Collection Time: 06/18/25  3:54 PM    Specimen: Peripheral, Antecubital, Left; Blood   Result Value Ref Range    Blood Culture No Growth After 96 hours    Comprehensive metabolic panel    Collection Time: 06/18/25  3:54 PM   Result Value Ref Range    Sodium 134 (L) 136 - 145 mmol/L    Potassium 6.2 (H) 3.5 - 5.1 mmol/L    Chloride 96 95 - 110 mmol/L    CO2 23 23 - 29 mmol/L    Glucose 154 (H) 70 - 110 mg/dL    BUN 14 8 - 23 mg/dL    Creatinine 0.7 0.5 - 1.4 mg/dL    Calcium 9.7 8.7 - 10.5 mg/dL    Protein Total 9.7 (H) 6.0 - 8.4 gm/dL    Albumin 2.6 (L) 3.5 - 5.2 g/dL    Bilirubin Total 0.3 0.1 - 1.0 mg/dL     (H) 40 - 150 unit/L    AST 73 (H) 11 - 45 unit/L    ALT 26 10 - 44 unit/L    Anion Gap 15 8 - 16 mmol/L    eGFR >60 >60 mL/min/1.73/m2   Lactic acid, plasma #1    Collection Time: 06/18/25  3:54 PM   Result Value Ref Range    Lactic Acid Level 2.8 (H) 0.5 - 2.2 mmol/L   CBC with Differential    Collection Time: 06/18/25  3:54 PM   Result Value Ref Range    WBC 13.44 (H) 3.90 - 12.70 K/uL    RBC 4.66 4.60 - 6.20 M/uL    HGB 11.1 (L) 14.0 - 18.0 gm/dL    HCT 36.6 (L) 40.0 - 54.0 %    MCV 79 (L) 82 - 98 fL    MCH 23.8 (L) 27.0 - 31.0 pg    MCHC 30.3 (L) 32.0 - 36.0 g/dL    RDW 23.2 (H) 11.5 - 14.5 %    Platelet Count 397 150 - 450 K/uL    MPV 9.4 9.2 - 12.9 fL    Nucleated RBC 0 <=0 /100 WBC    Neut % 90.2 (H) 38 - 73 %    Lymph % 4.9 (L) 18 - 48 %    Mono % 4.0 4 - 15 %    Eos % 0.1 <=8 %    Basophil % 0.1 <=1.9 %    Imm Grans % 0.7 (H) 0.0 - 0.5 %    Neut # 12.13 (H) 1.8 - 7.7 K/uL    Lymph # 0.66 (L) 1 - 4.8 K/uL    Mono # 0.54 0.3 - 1 K/uL    Eos # 0.01 <=0.5 K/uL    Baso # 0.01 <=0.2 K/uL    Imm Grans # 0.09 (H) 0.00 - 0.04 K/uL   Morphology    Collection Time: 06/18/25  3:54 PM    Specimen: Blood   Result Value Ref Range    Platelet Estimate Appears Normal      Fragmented Cells Occasional     Anisocytosis Slight     Polychromasia Occasional     Hypochromia Occasional    EKG 12-lead     Collection Time: 06/18/25  4:35 PM   Result Value Ref Range    QRS Duration 80 ms    [facility] QTC Calculation 417 ms   Blood culture x two cultures. Draw prior to antibiotics.    Collection Time: 06/18/25  6:22 PM    Specimen: Peripheral, Antecubital, Right; Blood   Result Value Ref Range    Blood Culture No Growth After 96 hours    Lactic acid, plasma #2    Collection Time: 06/18/25  6:22 PM   Result Value Ref Range    Lactic Acid Level 2.2 0.5 - 2.2 mmol/L   Potassium    Collection Time: 06/18/25  6:22 PM   Result Value Ref Range    Potassium 4.3 3.5 - 5.1 mmol/L   Urinalysis, Reflex to Urine Culture Urine, Clean Catch    Collection Time: 06/18/25  7:21 PM    Specimen: Urine   Result Value Ref Range    Color, UA Yellow Straw, Pinky, Yellow, Light-Orange    Appearance, UA Clear Clear    pH, UA 6.0 5.0 - 8.0    Spec Grav UA 1.020 1.005 - 1.030    Protein, UA Trace (A) Negative    Glucose, UA Negative Negative    Ketones, UA Negative Negative    Bilirubin, UA Negative Negative    Blood, UA Negative Negative    Nitrites, UA Negative Negative    Urobilinogen, UA Negative <2.0 EU/dL    Leukocyte Esterase, UA 1+ (A) Negative   GREY TOP URINE HOLD    Collection Time: 06/18/25  7:21 PM   Result Value Ref Range    Extra Tube Hold for add-ons.    Urinalysis Microscopic    Collection Time: 06/18/25  7:21 PM   Result Value Ref Range    WBC, UA 3 0 - 5 /HPF    Microscopic Comment         Imaging Results:  Imaging Results              IR Tunneled Cath Removal with Port (Final result)  Result time 06/19/25 13:26:32      Final result by Victoriano Maradiaga MD (06/19/25 13:26:32)                   Impression:      Technically successful totally implanted port removal.      Electronically signed by: Victoriano Maradiaga  Date:    06/19/2025  Time:    13:26               Narrative:    EXAMINATION:  IR TUNNELED CATH REMOVAL W/PORT    CLINICAL HISTORY:  Port eroded through skin.    COMPARISON:  Chest x-ray June 18, 2025.    TECHNIQUE:  Procedure:  Chest port removal    (s): Ashwini Pastrana    Indication: Port eroded through skin.    Procedure:    Removal of chest port.    Medications: 50 mcg Fentanyl IV.  50 mg Benadryl IV.    Contrast: None.    Complications: None immediate.    Two images were acquired.    Procedure:    The risks, benefits, and alternatives were discussed and informed consent was obtained.    The patient was prepped and draped in the usual Sterile manner.  Preprocedure fluoroscopic spot image of the chest was performed.  Local anesthetic was applied.  The port was removed using a combination of sharp and blunt dissection.  The pocket was flushed with vancomycin diluted in normal saline.  The pocket was sutured and covered with a sterile dressing.  Postprocedure fluoroscopic image was taken.  A sterile bandage was applied.    FINDINGS:  Port-A-Cath removed intact.                                       MRI Lumbar Spine W WO Cont (Final result)  Result time 06/19/25 15:13:50      Final result by Ish Garcia MD (06/19/25 15:13:50)                   Impression:      Multiple metastatic lesions throughout the lumbosacral spine with pathologic inferior L2 endplate fracture.  No evidence of epidural tumor.    Multilevel lumbar degenerative disc disease with large right L5-S1 lateral recess disc protrusion with right S1 nerve root impingement.    Nonspecific urinary bladder distention.      Electronically signed by: Ish Garcia MD  Date:    06/19/2025  Time:    15:13               Narrative:    EXAMINATION:  MRI LUMBAR SPINE W WO CONTRAST    CLINICAL HISTORY:  Bone neoplasm, lumbar spine, recurrence suspected;    TECHNIQUE:  Standard multiplanar without and with contrast MRI sequences of the lumbar spine performed with 7cc Gadavist.    COMPARISON:  CT June 18, 2025.    FINDINGS:  The distal cord and conus appear normal.    There are focal lesions within the T12, L1, L3, L4, L5, S1, S2 and S3 sacral segments.    There  is a pathologic fracture of the L2 vertebral body with minimal loss of height of the inferior endplate.    The bladder is distended.    T12-L1: Unremarkable.    L1-2:    Minor disc bulge.    L2-3:    Minor disc bulge and facet arthrosis.    L3-4:    Mild disc bulge and facet arthrosis.    L4-5:    Mild disc degeneration with moderate disc bulge and mild facet arthrosis.    L5-S1:   Moderate disc degeneration with large right paracentral disc protrusion with right ventral sac impingement and right S1 nerve root impingement as well.                                       MRI Thoracic Spine Demyelinating W W/O Contrast (Final result)  Result time 06/19/25 09:54:25      Final result by Ro Simmons MD (06/19/25 09:54:25)                   Impression:      1.    Exam is limited due to motion artifact and evaluation of the cord on the axial postcontrast sequence is difficult in the upper thoracic spine.  2.    Diffuse osseous metastatic disease with moderate to severe spinal canal stenosis at the level of T1-T2 and likely epidural involvement.  3.    Chronic pathologic compression fracture deformity of the T2 vertebral body with approximately 70% height loss and associated metastatic soft tissue lesions in the upper thoracic paraspinal tissues as above.  4.    Central disc protrusion indents the cord at T5-6 with an additional right paracentral disc protrusion that indents the cord at T8-9.  5.    Right hepatic lobe mass.  6.    Left greater than right basilar airspace opacities.    Finalized on: 6/19/2025 9:54 AM By:  Ro Simmons MD  [facility]# [id number]      2025-06-19 09:56:30.508     [facility]               Narrative:    PROCEDURE:  MRI THORACIC SPINE DEMYELINATING W W/O CONTRAST    INDICATION:  Bone neoplasm    TECHNIQUE: MRI THORACIC SPINE DEMYELINATING W W/O CONTRAST.  COMPARISON: CT of the thoracic spine 06/18/2025.    FINDINGS:  Motion artifact on the axial postcontrast thoracic spine sequence limits the evaluation.    Thoracic  spine:  Diffuse osseous metastatic disease throughout the visualized lower cervical spine and thoracic spine with heaviest burden in the C6, C7, T2-T5, and T10-T12 vertebral bodies.  Extension to the posterior elements most notable at the T2, T3, T4, T10-T12 levels.  Chronic pathologic compression fracture deformity of the T2 vertebral body with 70% height loss and associated adjacent enhancing soft tissue lesions about the right greater than left paraspinal region and spinous process.  Additional postcontrast enhancing soft tissue metastatic lesions about the right paraspinal soft tissues at T3-T4 with metastatic involvement of the right ribs.  Evaluation of the cord on the axial sequence is difficult due to motion artifact however there is moderate to severe spinal canal stenosis at T1-T2 on the sagittal views with epidural postcontrast enhancement ventrally and dorsally.    Grade 1 anterolisthesis of T1 on T2.  No additional acute compression fracture deformity.  Multilevel disc height loss.  Small central disc protrusion indents upon the cord at T5-6 with additional right paracentral disc protrusion that indents the cord at T8-9.    2.7 x 1.1 x 7.6 cm ovoid soft tissue lesion in the right paraspinal muscles at the level of T10-L1.  Left greater than right basilar airspace opacities.  Metastatic lesion at the right lobe of the liver.                                           CT Thoracic Spine Without Contrast (Final result)  Result time 06/18/25 19:31:49      Final result by Heladio Butt MD (06/18/25 19:31:49)                   Impression:     Progressed thoracic osseous metastatic disease with new T2 vertebral body pathologic fracture.  New significant T2 spinous process destruction with suspicion for epidural involvement of disease the T1-T2 level and at least mild to moderate spinal canal stenosis.    Unchanged T3 and T4 vertebral body height loss.    Degenerative T8-T9 mild spinal canal  stenosis.    Additional findings as detailed in the body of the report.    All CT scans at [this location] are performed using dose modulation techniques as appropriate to a performed exam including the following: automated exposure control; adjustment of the mA and/or kV according to patient size (this includes techniques or standardized protocols for targeted exams where dose is matched to indication / reason for exam; i.e. extremities or head); use of iterative reconstruction technique.      Finalized on: 6/18/2025 7:31 PM By:  Heladio Butt MD  [facility]# [id number]      2025-06-18 19:33:57.969     [facility]               Narrative:    EXAM: CT THORACIC SPINE WITHOUT CONTRAST    CLINICAL INDICATION: Bone neoplasm, thoracic spine, recurrent suspected    TECHNIQUE: Contiguous axial images were obtained of the thoracic spine without intravenous contrast.  Coronal and sagittal reformations were acquired.    COMPARISON: CT chest, abdomen and pelvis 02/24/2025    FINDINGS:   There appears to be interval progression of osseous metastatic disease with new areas of involvement and new extensive lytic components, particularly new lytic abnormality involving the majority of the T2 spinous process.  Few T2 vertebral body height loss/pathologic fracture as compared to the prior.  Similar superior T3 and T4 height loss/Schmorl's nodes.  Remaining thoracic vertebral body heights are maintained.  Progressed destructive lesion of the proximal right fourth rib with prominent soft tissue component extending to the pleura.  Prominent extraosseous soft tissue involvement of the T2 spinous process and vertebral body lesions.  Ovoid ovoid low-density focus within the right posterior paraspinous musculature measures roughly 5.7 x 2.4 x 8.1 cm.  Remaining paraspinal soft tissues appear grossly within normal limits.  Persistent left lower lobe pulmonary lesion.    Multilevel intervertebral disc height loss.  Ankylosis from T3-T11.  Probable  epidural involvement of disease at the T2 level contributing to mild to moderate spinal canal stenosis.  A few additional small posterior disc bulges most pronounced at T8-T9 with mild spinal canal stenosis.  Significant T1-T2 and T2-T3 neural foraminal stenosis.  Remaining thoracic neural foramina appear grossly patent.                                         CT Lumbar Spine Without Contrast (Final result)  Result time 06/18/25 16:44:40      Final result by Heladio Butt MD (06/18/25 16:44:40)                   Impression:      Extensive osseous metastatic disease with increased lucent lesions as compared to the prior CT 02/24/2025.  New inferior L1 pathologic fracture with only mild posterior inferior retropulsion.  No lumbar vertebral body height loss.    Multilevel lumbar degenerative changes are most pronounced at L5-S1 with moderate to severe spinal canal stenosis, severe right and moderate left-sided neural foraminal stenosis.  Right paracentral/subarticular zone disc extrusion appears to contact the descending right S1 nerve root.    L4-L5 moderate to severe spinal canal stenosis and mild bilateral neural foraminal stenosis.    All CT scans at [this location] are performed using dose modulation techniques as appropriate to a performed exam including the following: automated exposure control; adjustment of the mA and/or kV according to patient size (this includes techniques or standardized protocols for targeted exams where dose is matched to indication / reason for exam; i.e. extremities or head); use of iterative reconstruction technique.      Finalized on: 6/18/2025 4:44 PM By:  Heladio Butt MD  [facility]# [id number]      2025-06-18 16:46:48.909     [facility]               Narrative:    EXAM: CT LUMBAR SPINE WITHOUT CONTRAST    CLINICAL HISTORY: Low back pain, cauda equina syndrome suspected    TECHNIQUE: Contiguous axial images were obtained of the lumbar spine without intravenous contrast.  Coronal and  sagittal reformations were acquired.    COMPARISON: CT chest, abdomen and pelvis 02/24/2025    FINDINGS: Sclerotic and lucent changes identified throughout the lumbar vertebral bodies and visualized pelvis compatible with metastatic disease.  There appears to be progressed lucency as compared to the prior CT.  Lumbar vertebral body heights appear maintained.  Lumbar alignment is within normal limits.  New vertical lucency within the central inferior L1 vertebral body may reflect a fracture.  There is some mild inferior L1 bulging of the cortex.  Paraspinal soft tissues appear within normal limits.  Partially visualized intra-abdominal structures demonstrate partially visualized urinary bladder distention, nonobstructing right renal calcification and aortic atherosclerotic calcification.  Intervertebral disc levels are as follows:    T12-L1 Disc:  No significant disc pathology.  No spinal canal or neural foraminal stenosis.    L1-2 Disc:  Slight bulging of the posterior inferior L1 cortex.  No significant posterior disc bulge or spinal canal stenosis.  No significant neural foraminal stenosis.    L2-3 Disc:  Minimal broad-based posterior disc bulge and mild facet arthropathy.  No significant spinal canal stenosis or neural foraminal stenosis.    L3-4 Disc:  Broad-based posterior disc bulge, facet arthropathy and ligamentum flavum hypertrophy contributes to mild spinal canal stenosis.  No neural foraminal stenosis.    L4-5 Disc:  Broad-based posterior disc bulge, facet arthropathy and ligamentum flavum hypertrophy contributes to moderate to severe spinal canal stenosis and mild bilateral neural foraminal stenosis.    L5-S1 Disc:  Significant disc height loss with vacuum disc phenomenon.  Broad-based posterior disc bulge is asymmetric to the right.  Evidence of a right paracentral/subarticular zone disc extrusion with gas extending caudally along the course of the right S1 nerve root.  Moderate to severe spinal canal  stenosis.  Facet arthropathy contributes to severe right and moderate left-sided neural foraminal stenosis.                                         X-Ray Chest AP Portable (Final result)  Result time 06/18/25 15:51:17      Final result by AMARILIS Medina Sr., MD (06/18/25 15:51:17)                   Impression:      1. Mild pulmonary edema.  There has been interval improvement in the appearance of the lungs.  2. A right subclavian venous line remains in place.  .      Electronically signed by: Raza Medina MD  Date:    06/18/2025  Time:    15:51               Narrative:    EXAMINATION:  XR CHEST AP PORTABLE    CLINICAL HISTORY:  Sepsis;    COMPARISON:  05/15/2025    FINDINGS:  A right subclavian venous line remains in place.  The size of the heart is normal.  There is a mild amount of interstitial and alveolar opacities seen in both lungs.  There has been interval improvement in the appearance of the lungs.  There is no pneumothorax.  The costophrenic angles are sharp.                                       Radiology (Final result)  Result time 06/19/25 14:07:11      Final result by Unknown User (06/19/25 14:07:11)                                       Type of Interpretation: Outside Written Report.  Back X-Ray Type: MR lumbar spine without and with IV contrast.  Interpretation: Impression:   Multiple enhancing lesions of the thoracolumbar and sacral spine concerning for metastases, myeloma, or lymphoma. No evidence of pathologic fracture or epidural tumor.       Type of Interpretation: Outside Written Report.  Back X-Ray Type: MR Thoracic spine without and with IV contrast.  Interpretation: Impression:   Acute pathologic fracture of the T2 vertebral body with 43% loss of vertebral body height with bulging of the posterior wall into the spinal canal causing a critical spinal canal stenosis with cord edema. Findings concerning for ventral and dorsal epidural tumor. Recommend neurosurgery consult for  "decompression.    Numerous enhancing lesions throughout thoracic spine concerning for metastases, myeloma, or lymphoma. Recommend MRI of the cervical and lumbar spine for further evaluation.     Radiologist Jenna Epps MD      The EKG was ordered, reviewed, and independently interpreted by the ED provider.  Interpretation time: 16:35  Rate: 94 BPM  Rhythm: normal sinus rhythm  Interpretation: Left anterior fascicular block. No STEMI.         The Emergency Provider reviewed the vital signs and test results, which are outlined above.     ED Discussion     4:00 PM: A focused exam (Vital Signs Reviewed, Cardiopulmonary Exam, Capillary Refill Evaluation, Peripheral Pulse Evaluation and Skin Examination) performed.     6:46 PM: Discussed pt's case with Dr. Melo (Neurosurgery) recommends getting an MRI thoracic and lumbar spine with and without contrast. Dr. Melo also recommends getting a CT of his thoracic spine.    8:00 PM: Dr. Roberts transfers care of patient to Dr. Amato pending lab and radiology results.     1:42 AM: Discussed pt's case with Dr. Melo (Neurosurgery) who recommends hospice, states pt would like someone to remove his port.    3:26 AM: Discussed pt's case with Dr. Peterson (Hospital Medicine) who declines observation status. She states pt "can be seen by SW in the ED in AM to set up home/IP hospice. can be seen by IR in AM as well for port removal. Last heme onc notes say he was wanting to proceed with comfort care..".     6:00 AM: AM consult orders placed for palliative care and IR. Dr. Amato transfers care of patient to Dr. Camacho pending labs results.    11:48 AM: Reassessed pt at this time. Discussed with patient and/or family/caretaker all pertinent ED information and results. Discussed pt dx and plan of tx. Gave the patient and family all f/u and return to the ED instructions. All questions and concerns were addressed at this time. Patient and/or family/caretaker expresses " understanding of information and instructions, and is comfortable with plan to discharge. Pt is stable for discharge.     I discussed with patient and/or family/caretaker that evaluation in the ED does not suggest any emergent or life threatening medical conditions requiring immediate intervention beyond what was provided in the ED, and I believe patient is safe for discharge. Regardless, an unremarkable evaluation in the ED does not preclude the development or presence of a serious or life threatening condition. As such, I instructed that the patient is to return immediately for any worsening or change in current symptoms.          Medical Decision Making  This is 70 yo male with metastatic lung cancer who says he now cannot move his legs, although he does have sensation to them. He says he was up during the night walking to the bathroom and now cannot walk. No motor function bilaterally. He does have rectal tone and no loss of bowel or bladder function. CT lumbar spine: Extensive osseous metastatic disease with increased lucent lesions as compared to the prior CT 02/24/2025. New inferior L1 pathologic fracture with only mild posterior inferior retropulsion. No lumbar vertebral body height loss.  Multilevel lumbar degenerative changes are most pronounced at L5-S1 with moderate to severe spinal canal stenosis, severe right and moderate left-sided neural foraminal stenosis. Right paracentral/subarticular zone disc extrusion appears to contact the descending right S1 nerve root.    L4-L5 moderate to severe spinal canal stenosis and mild bilateral neural foraminal stenosis.    Mild leukocytosis noted, lactate 2.8, ECG NSR, left anterior fasicular block, mild pulmonary edema on CXR, kidney fxn normal, potassium 6.2 likely hemolyzed specimen, potassium repeated and 4.3, patient give mult rounds of pain meds, MRI of spine shows extensive bone mets, neurosurgery consulted, evaluated patient and recommended hospice, patient  subsequently amenable to hospice however also has mediport that appears infected and causing discomfort and needing removal, hospital med initially consulted for observation but declined, patient subsequently after several hours had mediport removed after discussing and being evaluated by IR, hospice then arranged and patient discharged to inpatient hospice at [facility].   Dr. Melo recommends MRI thoracic and lumbar spine      Amount and/or Complexity of Data Reviewed  Independent Historian: spouse  External Data Reviewed: labs, radiology, ECG and notes.  Labs: ordered. Decision-making details documented in ED Course.  Radiology: ordered and independent interpretation performed. Decision-making details documented in ED Course.     Details: [employer] reports provided.  ECG/medicine tests: ordered and independent interpretation performed. Decision-making details documented in ED Course.  Discussion of management or test interpretation with external provider(s): 8:10 AM: Consulted with [facility]. A liaison will come to the ED to talk with the pt and his family soon.    8:28 AM: Discussed pt's case with JIM Patel (Palliative Medicine). JIM Patel will speak with the pt shortly as a Palliative Medicine consult.    8:42 AM: Discussed pt's case with Dr. Victoriano Maradiaga MD (IR) who just evaluated the pt's Mediport at bedside. He advises starting abx for the pt and will schedule the pt's Mediport removal sometime before noon today.    9:39 AM: Discussed pt's case with Dr. Rudi Bailey IV, MD (Heme/Onc) who reports he had recommended hospice care to the pt in the past and had spoken with pt's other doctors to coordinate care before. Dr. Bailey fully supports pt's transition to hospice care.    11:47 AM: Asked JIM Patel (Palliative Medicine) if the pt needs to be held here or if he can be d/c to hospice. JIM Patel states if the pt and family are signing  consent documents, then the pt can be d/c to [facility].    Risk  OTC drugs.  Prescription drug management.  Decision regarding hospitalization.  Decision not to resuscitate or to de-escalate care because of poor prognosis.  Diagnosis or treatment significantly limited by social determinants of health.    Critical Care  Total time providing critical care: 60 minutes                ED Medication(s):  Medications   morphine injection 4 mg (4 mg Intravenous Given 6/18/25 1722)   ondansetron injection 4 mg (4 mg Intravenous Given 6/18/25 1717)   gadobutroL (GADAVIST) injection 10 mL (7 mLs Intravenous Given 6/18/25 2117)   morphine injection 4 mg (4 mg Intravenous Given 6/19/25 0546)   ceFAZolin 2 g (2 g Intravenous Given 6/19/25 0853)   vancomycin 1,750 mg in 0.9% NaCl 500 mL IVPB (admixture device) (0 mg Intravenous Stopped 6/19/25 1234)   LIDOcaine HCL 10 mg/ml (1%) injection (5 mLs Subcutaneous Given 6/19/25 1046)   diphenhydrAMINE injection (25 mg Intravenous Given 6/19/25 1047)   fentaNYL 50 mcg/mL injection (50 mcg Intravenous Given 6/19/25 1049)   midazolam injection (1 mg Intravenous Canceled Entry 6/19/25 1049)       Discharge Medication List as of 6/19/2025 11:56 AM        START taking these medications    Details   linezolid (ZYVOX) 600 mg Tab Take 1 tablet (600 mg total) by mouth every 12 (twelve) hours. for 7 days, Starting Thu 6/19/2025, Until Thu 6/26/2025, Print              Follow-up Information       [facility].    Specialties: Palliative Medicine, Hospice Services  Contact information:  [address] [facility] [id number] [phone]                                 Scribe Attestation:   Scribe #1: I performed the above scribed service and the documentation accurately describes the services I performed. I attest to the accuracy of the note.     Attending:   Physician Attestation Statement for Scribe #1: Alejandra BRANCH Michelle E, MD,  personally performed the services described in this documentation, as scribed by Joshua Blackwood, in my presence, and it is both accurate and complete.       Scribe Attestation:   Scribe #2: I performed the above scribed service and the documentation accurately describes the services I performed. I attest to the accuracy of the note.  Scribe #3: I performed the above scribed service and the documentation accurately describes the services I performed. I attest to the accuracy of the note.    Attending Attestation:           Physician Attestation for Scribe:    Physician Attestation Statement for Scribe #2: I, Kal Amato MD, reviewed documentation, as scribed by Chris Hastings in my presence, and it is both accurate and complete. I also acknowledge and confirm the content of the note done by Scribe #1.        Scribe Attestation:   Scribe #2: I performed the above scribed service and the documentation accurately describes the services I performed. I attest to the accuracy of the note.  Scribe #3: I performed the above scribed service and the documentation accurately describes the services I performed. I attest to the accuracy of the note.    Attending Attestation:       Physician Attestation for Scribe:    Physician Attestation Statement for Scribe #3: I, Loreta Camacho MD, reviewed documentation, as scribed by Jaylyn Cade in my presence, and it is both accurate and complete. I also acknowledge and confirm the content of the note done by Scribe #2 and Scribe #1.         Clinical Impression       ICD-10-CM ICD-9-CM   1. Lung cancer metastatic to bone  C34.90 162.9    C79.51 198.5   2. Screening for cardiovascular condition  Z13.6 V81.2   3. Metastasis to spinal column  C79.51 198.5   4. Immunocompromised state due to drug therapy  D84.821 V58.69    Z79.899    5. Bilateral leg weakness  R29.898 729.89   6. Infected venous access port, initial encounter  T80.219A 999.31   7. Spinal stenosis of lumbar region  with neurogenic claudication  M48.062 724.03   8. 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  C34.32 162.5       Disposition:   Disposition: Discharged  Condition: Stable        Loreta Camacho MD  06/23/25 6636

## 2025-06-18 NOTE — FIRST PROVIDER EVALUATION
Medical screening examination initiated.  I have conducted a focused provider triage encounter, findings are as follows:    Brief history of present illness:  Pt. C/o sudden loss of lower leg function, an infected mediport.     Vitals:    06/18/25 1448   BP: 133/73   Pulse: (!) 116   Resp: 18   Temp: 98.4 °F (36.9 °C)   SpO2: 99%       Pertinent physical exam:  unable to move LE, no distress    Brief workup plan:  labs imaging,       Preliminary workup initiated; this workup will be continued and followed by the physician or advanced practice provider that is assigned to the patient when roomed.

## 2025-06-18 NOTE — PHARMACY MED REC
"Admission Medication History     The home medication history was taken by Cheng Cook.    You may go to "Admission" then "Reconcile Home Medications" tabs to review and/or act upon these items.     The home medication list has been updated by the Pharmacy department.   Please read ALL comments highlighted in yellow.   Please address this information as you see fit.    Feel free to contact us if you have any questions or require assistance.      Medications listed below were obtained from: Patient/family and Analytic software- [employer]  (Not in a hospital admission)        Cheng Cook  [id number]      Current Outpatient Medications on File Prior to Encounter   Medication Sig Dispense Refill Last Dose/Taking    DULoxetine (CYMBALTA) 60 MG capsule Take 1 capsule (60 mg total) by mouth once daily. for 120 doses 30 capsule 3 Past Week    amoxicillin (AMOXIL) 500 MG capsule TAKE 1 CAPSULE BY MOUTH THREE TIMES DAILY UNTIL ALL TAKEN (Patient not taking: Reported on 6/18/2025)   Not Taking    benzonatate (TESSALON) 100 MG capsule Take 1 capsule (100 mg total) by mouth 3 (three) times daily as needed for Cough. 90 capsule 2     chlorhexidine (PERIDEX) 0.12 % solution RINSE MOUTH WITH 15 ML ONCE DAILY IN THE EVENING (Patient not taking: Reported on 6/18/2025)   Not Taking    cyanocobalamin 1,000 mcg/mL injection Inject 1,000 mcg into the muscle every 30 days.       dexAMETHasone (DECADRON) 4 MG Tab Take 1 tablet (4 mg total) by mouth once daily. 60 tablet 0     diphenhydrAMINE (BENADRYL) 50 MG capsule Take 50mg by mouth 1 hour before contrast administration. 1 capsule 0     diphenhydrAMINE-aluminum-magnesium hydroxide-simethicone-LIDOcaine viscous HCl 2% Swish and spit 15 mLs every 4 (four) hours as needed. 1 each 2     fluticasone propionate (FLONASE) 50 mcg/actuation nasal spray 1 spray by Each Nostril route once daily.       Lactobacillus rhamnosus GG (CULTURELLE) 10 billion cell capsule Take 1 capsule by mouth " every morning.       LIDOcaine (LIDODERM) 5 % Place 1 patch onto the skin once daily. Remove & Discard patch within 12 hours or as directed by MD 10 patch 0     LIDOcaine 5 % Crea SMARTSIG:Sparingly Topical 3 Times Daily       LIDOcaine viscous HCl 2% (XYLOCAINE) 2 % Soln        magic mouthwash diphen/antac/lidoc Swish and spit 15 mLs every 4 (four) hours as needed. 450 mL 2     mupirocin (BACTROBAN) 2 % ointment Apply 1 Application topically.       mv, min #36-iron,carbonyl-FA (GERITOL COMPLETE) 16 mg iron- 0.38 mg Tab Take 1 tablet by mouth once daily.       nintedanib (OFEV) 100 mg Cap Take 100 mg by mouth 2 (two) times daily. (Patient not taking: Reported on 6/18/2025)   Not Taking    omega 3-dha-epa-fish oil 1,000 mg (120 mg-180 mg) Cap Take 1,000 mg by mouth.       ondansetron (ZOFRAN-ODT) 4 MG TbDL Take 4 mg by mouth.       oxyCODONE-acetaminophen (PERCOCET)  mg per tablet Take 1 tablet by mouth every 6 (six) hours as needed for Pain. 120 tablet 0     pantoprazole (PROTONIX) 40 MG tablet Take 40 mg by mouth once daily.       phenazopyridine (PYRIDIUM) 200 MG tablet Take 200 mg by mouth 3 (three) times daily as needed.       pregabalin (LYRICA) 75 MG capsule Take 75 mg by mouth 2 (two) times daily. (Patient not taking: Reported on 6/18/2025)   Not Taking    raloxifene (EVISTA) 60 mg tablet Take 1 tablet by mouth once daily. (Patient not taking: Reported on 6/18/2025)   Not Taking    sildenafil (REVATIO) 20 mg Tab Take 1 tablet by mouth 3 (three) times daily. (Patient not taking: Reported on 6/18/2025)   Not Taking    triamcinolone acetonide 0.1% (KENALOG) 0.1 % ointment APPLY OINTMENT TOPICALLY TO AFFECTED AREA TWICE DAILY FOR 30 DAYS, FOR LEFT RING FINGER.                              .

## 2025-06-19 VITALS
DIASTOLIC BLOOD PRESSURE: 77 MMHG | BODY MASS INDEX: 23.52 KG/M2 | OXYGEN SATURATION: 98 % | HEART RATE: 103 BPM | SYSTOLIC BLOOD PRESSURE: 147 MMHG | RESPIRATION RATE: 20 BRPM | TEMPERATURE: 98 F | HEIGHT: 71 IN | WEIGHT: 168 LBS

## 2025-06-19 LAB
OHS QRS DURATION: 80 MS
OHS QTC CALCULATION: 417 MS

## 2025-06-19 PROCEDURE — 25000003 PHARM REV CODE 250: Performed by: EMERGENCY MEDICINE

## 2025-06-19 PROCEDURE — 96366 THER/PROPH/DIAG IV INF ADDON: CPT

## 2025-06-19 PROCEDURE — 99417 PROLNG OP E/M EACH 15 MIN: CPT | Mod: ,,,

## 2025-06-19 PROCEDURE — 96361 HYDRATE IV INFUSION ADD-ON: CPT

## 2025-06-19 PROCEDURE — 96375 TX/PRO/DX INJ NEW DRUG ADDON: CPT

## 2025-06-19 PROCEDURE — 99215 OFFICE O/P EST HI 40 MIN: CPT | Mod: 25,,,

## 2025-06-19 PROCEDURE — 63600175 PHARM REV CODE 636 W HCPCS: Performed by: EMERGENCY MEDICINE

## 2025-06-19 PROCEDURE — 96365 THER/PROPH/DIAG IV INF INIT: CPT

## 2025-06-19 PROCEDURE — 96376 TX/PRO/DX INJ SAME DRUG ADON: CPT

## 2025-06-19 PROCEDURE — 99497 ADVNCD CARE PLAN 30 MIN: CPT | Mod: 25,,,

## 2025-06-19 PROCEDURE — 99284 EMERGENCY DEPT VISIT MOD MDM: CPT | Mod: ,,, | Performed by: STUDENT IN AN ORGANIZED HEALTH CARE EDUCATION/TRAINING PROGRAM

## 2025-06-19 PROCEDURE — 63600175 PHARM REV CODE 636 W HCPCS: Performed by: RADIOLOGY

## 2025-06-19 RX ORDER — SODIUM CHLORIDE 9 MG/ML
1000 INJECTION, SOLUTION INTRAVENOUS CONTINUOUS
Status: DISCONTINUED | OUTPATIENT
Start: 2025-06-19 | End: 2025-06-19 | Stop reason: HOSPADM

## 2025-06-19 RX ORDER — CEFAZOLIN 2 G/1
2 INJECTION, POWDER, FOR SOLUTION INTRAMUSCULAR; INTRAVENOUS ONCE
Status: COMPLETED | OUTPATIENT
Start: 2025-06-19 | End: 2025-06-19

## 2025-06-19 RX ORDER — MORPHINE SULFATE 4 MG/ML
4 INJECTION, SOLUTION INTRAMUSCULAR; INTRAVENOUS ONCE AS NEEDED
Status: COMPLETED | OUTPATIENT
Start: 2025-06-19 | End: 2025-06-19

## 2025-06-19 RX ORDER — LIDOCAINE HYDROCHLORIDE 10 MG/ML
INJECTION, SOLUTION INFILTRATION; PERINEURAL CODE/TRAUMA/SEDATION MEDICATION
Status: COMPLETED | OUTPATIENT
Start: 2025-06-19 | End: 2025-06-19

## 2025-06-19 RX ORDER — LINEZOLID 600 MG/1
600 TABLET, FILM COATED ORAL EVERY 12 HOURS
Qty: 14 TABLET | Refills: 0 | Status: SHIPPED | OUTPATIENT
Start: 2025-06-19 | End: 2025-06-26

## 2025-06-19 RX ORDER — MIDAZOLAM HYDROCHLORIDE 1 MG/ML
INJECTION, SOLUTION INTRAMUSCULAR; INTRAVENOUS CODE/TRAUMA/SEDATION MEDICATION
Status: COMPLETED | OUTPATIENT
Start: 2025-06-19 | End: 2025-06-19

## 2025-06-19 RX ORDER — FENTANYL CITRATE 50 UG/ML
INJECTION, SOLUTION INTRAMUSCULAR; INTRAVENOUS CODE/TRAUMA/SEDATION MEDICATION
Status: COMPLETED | OUTPATIENT
Start: 2025-06-19 | End: 2025-06-19

## 2025-06-19 RX ORDER — DIPHENHYDRAMINE HYDROCHLORIDE 50 MG/ML
INJECTION, SOLUTION INTRAMUSCULAR; INTRAVENOUS CODE/TRAUMA/SEDATION MEDICATION
Status: COMPLETED | OUTPATIENT
Start: 2025-06-19 | End: 2025-06-19

## 2025-06-19 RX ADMIN — MORPHINE SULFATE 4 MG: 4 INJECTION INTRAVENOUS at 05:06

## 2025-06-19 RX ADMIN — FENTANYL CITRATE 50 MCG: 50 INJECTION, SOLUTION INTRAMUSCULAR; INTRAVENOUS at 10:06

## 2025-06-19 RX ADMIN — CEFAZOLIN 2 G: 2 INJECTION, POWDER, FOR SOLUTION INTRAMUSCULAR; INTRAVENOUS at 08:06

## 2025-06-19 RX ADMIN — VANCOMYCIN HYDROCHLORIDE 1750 MG: 1.75 INJECTION, POWDER, LYOPHILIZED, FOR SOLUTION INTRAVENOUS at 09:06

## 2025-06-19 RX ADMIN — SODIUM CHLORIDE 1000 ML: 9 INJECTION, SOLUTION INTRAVENOUS at 05:06

## 2025-06-19 RX ADMIN — DIPHENHYDRAMINE HYDROCHLORIDE 25 MG: 50 INJECTION INTRAMUSCULAR; INTRAVENOUS at 10:06

## 2025-06-19 RX ADMIN — LIDOCAINE HYDROCHLORIDE 5 ML: 10 INJECTION, SOLUTION INFILTRATION; PERINEURAL at 10:06

## 2025-06-19 NOTE — PLAN OF CARE
06/19/25 0903   Post-Acute Status   Post-Acute Authorization Hospice   Hospice Status Referrals Sent   Discharge Plan   Discharge Plan A Inpatient Hospice     Per family's request referral be sent to [facility] ([facility]) inpatient hospice.

## 2025-06-19 NOTE — PLAN OF CARE
06/19/25 1230   Post-Acute Status   Post-Acute Authorization Hospice   Hospice Status Set-up Complete/Auth obtained   Discharge Delays None known at this time   Discharge Plan   Discharge Plan A Inpatient Hospice     Patient accepted by [facility] inpatient hospice.

## 2025-06-19 NOTE — ASSESSMENT & PLAN NOTE
Primary oncologist, Dr. Bailey - notified of admission.   After discussions regarding new spinal cord impingement and lack of available treatment options he is in agreement with recommending transition to hospice.

## 2025-06-19 NOTE — CONSULTS
Chart reviewed by Dr. Maradiaga.       ASSESSMENT/PLAN:    Infected port    The order for a port removal has been placed and the procedure will be performed asap.          Thank you for the consult.

## 2025-06-19 NOTE — CONSULTS
[facility] - Emergency Dept.  Palliative Medicine  Consult Note    Patient Name: Ernie Walton  MRN: [id number]  Admission Date: 6/18/2025  Hospital Length of Stay: 0 days  Code Status: Prior   Attending Provider: Loreta Camacho MD  Consulting Provider: JIM Patel  Primary Care Physician: Radha Jackson MD  Principal Problem:<principal problem not specified>    Patient information was obtained from patient, relative(s), and primary team.      Inpatient consult to Palliative Care  Consult performed by: Marycarmen Maharaj FNP  Consult ordered by: Kal Amato MD        Assessment/Plan:     Neuro  Peripheral neuropathy  Continue duloxetine 60 mg daily     Oncology  Metastasis to bone  NSX consulted, recommended palliative/hospice care consultation  No surgical recommendations at this time as there is no guarantee he would have any improvement with the surgical decompression       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  Primary oncologist, Dr. Bailey - notified of admission.   After discussions regarding new spinal cord impingement and lack of available treatment options he is in agreement with recommending transition to hospice.    Palliative Care  Encounter for palliative care  Impression:  Palliative care consulted for goals of care and advanced care planning     Met with pt today along with his two sisters. Wife and daughter currently not at bedside, they are back home in [place], [place].  Pt shared that he was at his brother's when all of his symptoms started and while the news isn't great he is accepting of his condition and only hopes to transition home with excellent symptom management.   At this time, family and patient do not anticipate the pt returning back home to [place] as he has lots of family support here. They plan on going to the [facility] for a day or two so that his brother, Tom's house can be set up and ready for him to go home and do hospice there. Pt's daughter and wife also in agreement  "with the plan per the patient.  They do not have any questions at this time but the patient did want to make sure that the neuropathy medication Dr. Bailey prescribed him is continued following discharge. I explained that I would conduct a chart review as well as the [facility] and any medication the patient wants to continue taking would be refilled.  The pt expressed gratitude for the care and honesty he has received not only on this admission but from Dr. Granados and CRISTAL Cook, [facility] outpatient.    Life Limiting Diagnosis:  Adenocarcinoma of the LLL of lung with bony metastasis.  -Prognosis-Time and potential for recovery: poor  -Functional status: fair.  Pt was able to manage ADLs  -Dementia diagnosis no  - Prior experience with serious illness: yes  -The patient has previously engaged in advance care planning or GOC discussions  - Insight/Understanding of illness: good    Symptom Management:  -Pain: continue with duloxetine 60 mg daily for neuropathy upon discharge. [facility] will assume coverage of pain medications on discharge.    Summary of recommendations and follow up plan:  -Most important goals at this time: QOL, comfort   -Code status: Full Code   -Disposition: IP hospice then home with hospice            Thank you for your consult. I will sign off. Please contact us if you have any additional questions.    Subjective:     HPI:   Per NeuroSx: "Mr. Walton is a 69-year-old male with known widespread adenocarcinoma who has recently stopped chemotherapy within the past few weeks and then transitioned to palliative/hospice. His known diffuse metastatic osseous disease. He complains of his right chest port being infected and being painful and is asking to have it removed. When he woke up yesterday morning he was unable to walk and had weakness with numbness and tingling in his legs. He has no symptoms in his arms. He is asking when he can eat. He had to have his friends carry him from his house to " "the to be brought to the hospital. He denies any changes in bowel or bladder function "    Hospital Course:  No notes on file    Interval History: Pt resting comfortably in ER stretcher, NAD. Evaluated by NSX today who recommended transition to hospice given advanced disease state. Family awaiting to sign consents with [facility] hospice.    Past Medical History:   Diagnosis Date    Bone cancer     DDD (degenerative disc disease), lumbosacral     Liver cancer     Low back pain     Lung cancer     Scleroderma        Past Surgical History:   Procedure Laterality Date    LITHOTRIPSY      LUNG BIOPSY         Review of patient's allergies indicates:   Allergen Reactions    Milk containing products (dairy) Nausea And Vomiting     Per pt milked when used with cooking causes pt to vomit       Medications:  Continuous Infusions:   0.9% NaCl  1,000 mL Intravenous Continuous 125 mL/hr at 06/19/25 0546 1,000 mL at 06/19/25 0546     Scheduled Meds:   vancomycin (VANCOCIN) IV (PEDS and ADULTS)  1,250 mg Intravenous Q12H     PRN Meds:  Current Facility-Administered Medications:     Pharmacy to dose Vancomycin consult, , , Once **AND** vancomycin - pharmacy to dose, , Intravenous, pharmacy to manage frequency    Family History    None       Tobacco Use    Smoking status: Former     Types: Cigarettes     Passive exposure: Never    Smokeless tobacco: Never   Substance and Sexual Activity    Alcohol use: Not Currently    Drug use: Not Currently    Sexual activity: Not on file       Review of Systems   Constitutional: Negative.    HENT: Negative.     Respiratory: Negative.     Cardiovascular: Negative.    Endocrine: Negative.    Genitourinary: Negative.    Musculoskeletal:  Positive for back pain.   Skin: Negative.    Allergic/Immunologic: Negative.    Neurological:  Positive for weakness and numbness.   Psychiatric/Behavioral: Negative.       Objective:     Vital Signs (Most Recent):  Temp: 98.4 °F (36.9 °C) (06/18/25 1733)  Pulse: 99 " (06/19/25 1140)  Resp: 19 (06/19/25 1140)  BP: (!) 145/70 (06/19/25 1140)  SpO2: 97 % (06/19/25 1140) Vital Signs (24h Range):  Temp:  [98.4 °F (36.9 °C)] 98.4 °F (36.9 °C)  Pulse:  [] 99  Resp:  [16-23] 19  SpO2:  [95 %-99 %] 97 %  BP: (106-162)/(61-90) 145/70     Weight: 76.2 kg (168 lb)  Body mass index is 23.43 kg/m².       Physical Exam  Constitutional:       Appearance: Normal appearance.   HENT:      Head: Normocephalic.   Cardiovascular:      Rate and Rhythm: Normal rate.   Pulmonary:      Effort: Pulmonary effort is normal.   Abdominal:      Palpations: Abdomen is soft.   Musculoskeletal:      Cervical back: Normal range of motion.   Skin:     General: Skin is warm and dry.   Neurological:      General: No focal deficit present.      Mental Status: He is alert and oriented to person, place, and time. Mental status is at baseline.      Motor: Weakness present.      Gait: Gait abnormal.   Psychiatric:         Mood and Affect: Mood normal.         Behavior: Behavior normal.         Thought Content: Thought content normal.         Judgment: Judgment normal.            Review of Symptoms      Symptom Assessment (ESAS 0-10 Scale)  Pain:  0  Dyspnea:  0  Anxiety:  0  Nausea:  0  Depression:  0  Anorexia:  0  Fatigue:  0  Insomnia:  0  Restlessness:  0  Agitation:  0         Psychosocial/Cultural:   See Palliative Psychosocial Note: Yes   to [facility], they reside in [place], [place]. Pt also has a daughter, Mary. She is a hospice nurse in [place]. Pt was staying at his brother's house when he began experiencing worsening weakness, numbness, and tingling in his BLE. He is ready to enroll in hospice as there are no viable treatment options available for his cancer and spinal cord compression.  **Primary  to Follow**  Palliative Care  Consult: No        Advance Care Planning  Advance Directives:   Living Will: Yes        Copy on chart: Yes    LaPOST: No    Do Not Resuscitate Status: No     Medical Power of : Yes    Agent's Name:  Yesenia Walton - spouse   Agent's Contact Number:  [phone]    Decision Making:  Patient answered questions  Goals of Care: The family endorses that what is most important right now is to focus on comfort and QOL     Accordingly, we have decided that the best plan to meet the patient's goals includes enrolling in hospice care and pivot to comfort-focused care           Significant Labs: All pertinent labs within the past 24 hours have been reviewed.  CBC:   Recent Labs   Lab 06/18/25  1554   WBC 13.44*   HGB 11.1*   HCT 36.6*   MCV 79*        BMP:  Recent Labs   Lab 06/18/25  1554 06/18/25  1822   *  --    *  --    K 6.2* 4.3   CL 96  --    CO2 23  --    BUN 14  --    CREATININE 0.7  --    CALCIUM 9.7  --      LFT:  Lab Results   Component Value Date    AST 73 (H) 06/18/2025    ALKPHOS 224 (H) 06/18/2025    BILITOT 0.3 06/18/2025     Albumin:   Albumin   Date Value Ref Range Status   06/18/2025 2.6 (L) 3.5 - 5.2 g/dL Final   03/18/2025 2.7 (L) 3.5 - 5.2 g/dL Final     Protein:   Protein Total   Date Value Ref Range Status   06/18/2025 9.7 (H) 6.0 - 8.4 gm/dL Final     Total Protein   Date Value Ref Range Status   03/18/2025 8.2 6.0 - 8.4 g/dL Final     Lactic acid:   Lab Results   Component Value Date    LACTATE 2.2 06/18/2025    LACTATE 2.8 (H) 06/18/2025       Significant Imaging: I have reviewed all pertinent imaging results/findings within the past 24 hours.      75 min visit spent in chart review, face to face discussion of symptom assessment, coordination of care with other specialists, and d/c planning  16 minutes ACP time spent: goals of care, emotional support, formulating and communication prognosis and goals of care, exploring burden/ benefit of various approaches of treatment.       Marycarmen Maharaj, JIM  Palliative Medicine  [facility] - Emergency Dept.

## 2025-06-19 NOTE — HPI
"Per NeuroSx: "Mr. Walton is a 69-year-old male with known widespread adenocarcinoma who has recently stopped chemotherapy within the past few weeks and then transitioned to palliative/hospice. His known diffuse metastatic osseous disease. He complains of his right chest port being infected and being painful and is asking to have it removed. When he woke up yesterday morning he was unable to walk and had weakness with numbness and tingling in his legs. He has no symptoms in his arms. He is asking when he can eat. He had to have his friends carry him from his house to the to be brought to the hospital. He denies any changes in bowel or bladder function "  "

## 2025-06-19 NOTE — ASSESSMENT & PLAN NOTE
NSX consulted, recommended palliative/hospice care consultation  No surgical recommendations at this time as there is no guarantee he would have any improvement with the surgical decompression

## 2025-06-19 NOTE — SUBJECTIVE & OBJECTIVE
Interval History: Pt resting comfortably in ER stretcher, NAD. Evaluated by NSX today who recommended transition to hospice given advanced disease state. Family awaiting to sign consents with [facility] hospice.    Past Medical History:   Diagnosis Date    Bone cancer     DDD (degenerative disc disease), lumbosacral     Liver cancer     Low back pain     Lung cancer     Scleroderma        Past Surgical History:   Procedure Laterality Date    LITHOTRIPSY      LUNG BIOPSY         Review of patient's allergies indicates:   Allergen Reactions    Milk containing products (dairy) Nausea And Vomiting     Per pt milked when used with cooking causes pt to vomit       Medications:  Continuous Infusions:   0.9% NaCl  1,000 mL Intravenous Continuous 125 mL/hr at 06/19/25 0546 1,000 mL at 06/19/25 0546     Scheduled Meds:   vancomycin (VANCOCIN) IV (PEDS and ADULTS)  1,250 mg Intravenous Q12H     PRN Meds:  Current Facility-Administered Medications:     Pharmacy to dose Vancomycin consult, , , Once **AND** vancomycin - pharmacy to dose, , Intravenous, pharmacy to manage frequency    Family History    None       Tobacco Use    Smoking status: Former     Types: Cigarettes     Passive exposure: Never    Smokeless tobacco: Never   Substance and Sexual Activity    Alcohol use: Not Currently    Drug use: Not Currently    Sexual activity: Not on file       Review of Systems   Constitutional: Negative.    HENT: Negative.     Respiratory: Negative.     Cardiovascular: Negative.    Endocrine: Negative.    Genitourinary: Negative.    Musculoskeletal:  Positive for back pain.   Skin: Negative.    Allergic/Immunologic: Negative.    Neurological:  Positive for weakness and numbness.   Psychiatric/Behavioral: Negative.       Objective:     Vital Signs (Most Recent):  Temp: 98.4 °F (36.9 °C) (06/18/25 1733)  Pulse: 99 (06/19/25 1140)  Resp: 19 (06/19/25 1140)  BP: (!) 145/70 (06/19/25 1140)  SpO2: 97 % (06/19/25 1140) Vital Signs (24h  Range):  Temp:  [98.4 °F (36.9 °C)] 98.4 °F (36.9 °C)  Pulse:  [] 99  Resp:  [16-23] 19  SpO2:  [95 %-99 %] 97 %  BP: (106-162)/(61-90) 145/70     Weight: 76.2 kg (168 lb)  Body mass index is 23.43 kg/m².       Physical Exam  Constitutional:       Appearance: Normal appearance.   HENT:      Head: Normocephalic.   Cardiovascular:      Rate and Rhythm: Normal rate.   Pulmonary:      Effort: Pulmonary effort is normal.   Abdominal:      Palpations: Abdomen is soft.   Musculoskeletal:      Cervical back: Normal range of motion.   Skin:     General: Skin is warm and dry.   Neurological:      General: No focal deficit present.      Mental Status: He is alert and oriented to person, place, and time. Mental status is at baseline.      Motor: Weakness present.      Gait: Gait abnormal.   Psychiatric:         Mood and Affect: Mood normal.         Behavior: Behavior normal.         Thought Content: Thought content normal.         Judgment: Judgment normal.            Review of Symptoms      Symptom Assessment (ESAS 0-10 Scale)  Pain:  0  Dyspnea:  0  Anxiety:  0  Nausea:  0  Depression:  0  Anorexia:  0  Fatigue:  0  Insomnia:  0  Restlessness:  0  Agitation:  0         Psychosocial/Cultural:   See Palliative Psychosocial Note: Yes   to Yesenia, they reside in [place], [place]. Shree also has a daughter, Mary. She is a hospice nurse in [place]. Pt was staying at his brother's house when he began experiencing worsening weakness, numbness, and tingling in his BLE. He is ready to enroll in hospice as there are no viable treatment options available for his cancer and spinal cord compression.  **Primary  to Follow**  Palliative Care  Consult: No        Advance Care Planning   Advance Directives:   Living Will: Yes        Copy on chart: Yes    LaPOST: No    Do Not Resuscitate Status: No    Medical Power of : Yes    Agent's Name:  Yesenia Walton - spouse   Agent's Contact Number:   [phone]    Decision Making:  Patient answered questions  Goals of Care: The family endorses that what is most important right now is to focus on comfort and QOL     Accordingly, we have decided that the best plan to meet the patient's goals includes enrolling in hospice care and pivot to comfort-focused care           Significant Labs: All pertinent labs within the past 24 hours have been reviewed.  CBC:   Recent Labs   Lab 06/18/25  1554   WBC 13.44*   HGB 11.1*   HCT 36.6*   MCV 79*        BMP:  Recent Labs   Lab 06/18/25  1554 06/18/25  1822   *  --    *  --    K 6.2* 4.3   CL 96  --    CO2 23  --    BUN 14  --    CREATININE 0.7  --    CALCIUM 9.7  --      LFT:  Lab Results   Component Value Date    AST 73 (H) 06/18/2025    ALKPHOS 224 (H) 06/18/2025    BILITOT 0.3 06/18/2025     Albumin:   Albumin   Date Value Ref Range Status   06/18/2025 2.6 (L) 3.5 - 5.2 g/dL Final   03/18/2025 2.7 (L) 3.5 - 5.2 g/dL Final     Protein:   Protein Total   Date Value Ref Range Status   06/18/2025 9.7 (H) 6.0 - 8.4 gm/dL Final     Total Protein   Date Value Ref Range Status   03/18/2025 8.2 6.0 - 8.4 g/dL Final     Lactic acid:   Lab Results   Component Value Date    LACTATE 2.2 06/18/2025    LACTATE 2.8 (H) 06/18/2025       Significant Imaging: I have reviewed all pertinent imaging results/findings within the past 24 hours.

## 2025-06-19 NOTE — PLAN OF CARE
REVIEWED POC WITH PATIENT AND SISTERS. DISCUSSED EVALUATION OF PROCEDURE SITE ON TUESDAY ANDREEA (SISTER). WILL CALL ANDREEA ON TUESDAY TO SPEAK WITH INPATIENT HOSPICE NURSE ABOUT SITE CONDITION. PATIENT AND ANDREEA VERBALIZED UNDERSTANDING.

## 2025-06-19 NOTE — PROGRESS NOTES
"Pharmacokinetic Initial Assessment: IV Vancomycin    Assessment/Plan:    Initiate intravenous vancomycin with loading dose of 1750 mg once followed by a maintenance dose of vancomycin 1250mg IV every 12 hours  Desired empiric serum trough concentration is 10 to 20 mcg/mL  Draw vancomycin trough level 60 min prior to fourth dose on 6/20 at approximately 2000  Pharmacy will continue to follow and monitor vancomycin.      Please contact pharmacy at extension [phone] with any questions regarding this assessment.     Thank you for the consult,   Jerri Thornton       Patient brief summary:  Ernie Walton is a 69 y.o. male initiated on antimicrobial therapy with IV Vancomycin for treatment of suspected skin & soft tissue infection    Drug Allergies:   Review of patient's allergies indicates:   Allergen Reactions    Milk containing products (dairy) Nausea And Vomiting     Per pt milked when used with cooking causes pt to vomit       Actual Body Weight:   76.2 kg    Renal Function:   Estimated Creatinine Clearance: 107.6 mL/min (based on SCr of 0.7 mg/dL).,     Dialysis Method (if applicable):  N/A    CBC (last 72 hours):  Recent Labs   Lab Result Units 06/18/25  1554   WBC K/uL 13.44*   HGB gm/dL 11.1*   HCT % 36.6*   Platelet Count K/uL 397   Lymph % % 4.9*   Mono % % 4.0   Eos % % 0.1   Basophil % % 0.1       Metabolic Panel (last 72 hours):  Recent Labs   Lab Result Units 06/18/25  1554 06/18/25  1822 06/18/25  1921   Sodium mmol/L 134*  --   --    Potassium mmol/L 6.2* 4.3  --    Chloride mmol/L 96  --   --    CO2 mmol/L 23  --   --    Glucose mg/dL 154*  --   --    Glucose, UA   --   --  Negative   BUN mg/dL 14  --   --    Creatinine mg/dL 0.7  --   --    Albumin g/dL 2.6*  --   --    Bilirubin Total mg/dL 0.3  --   --    ALP unit/L 224*  --   --    AST unit/L 73*  --   --    ALT unit/L 26  --   --        Drug levels (last 3 results):  No results for input(s): "VANCOMYCINRA", "VANCORANDOM", "VANCOMYCINPE", "VANCOPEAK", " ""VANCOMYCINTR", "VANCOTROUGH" in the last 72 hours.    Microbiologic Results:  Microbiology Results (last 7 days)       Procedure Component Value Units Date/Time    Blood culture x two cultures. Draw prior to antibiotics. [[id number]] Collected: 06/18/25 1822    Order Status: Resulted Specimen: Blood from Peripheral, Antecubital, Right Updated: 06/18/25 1828    Blood culture x two cultures. Draw prior to antibiotics. [[id number]] Collected: 06/18/25 1554    Order Status: Resulted Specimen: Blood from Peripheral, Antecubital, Left Updated: 06/18/25 1558          Jerri Thornton [facility] 6/19/2025 10:05 AM    "

## 2025-06-19 NOTE — ASSESSMENT & PLAN NOTE
Impression:  Palliative care consulted for goals of care and advanced care planning   6/9/25:  Met with pt today along with his two sisters. Wife and daughter currently not at bedside, they are back home in [place], [place].  Pt shared that he was at his brother's when all of his symptoms started and while the news isn't great he is accepting of his condition and only hopes to transition home with excellent symptom management.   At this time, family and patient do not anticipate the pt returning back home to [place] as he has lots of family support here. They plan on going to the [facility] for a day or two so that his brother, Tom's house can be set up and ready for him to go home and do hospice there. Pt's daughter and wife also in agreement with the plan per the patient.  They do not have any questions at this time but the patient did want to make sure that the neuropathy medication Dr. Bailey prescribed him is continued following discharge. I explained that I would conduct a chart review as well as the [facility] hosp[facility] and any medication the patient wants to continue taking would be refilled.  The pt expressed gratitude for the care and honesty he has received not only on this admission but from Dr. Granados and CRISTAL Cook, [facility] outpatient.    Life Limiting Diagnosis:  Adenocarcinoma of the LLL of lung with bony metastasis.  -Prognosis-Time and potential for recovery: poor  -Functional status: fair.  Pt was able to manage ADLs  -Dementia diagnosis no  - Prior experience with serious illness: yes  -The patient has previously engaged in advance care planning or GOC discussions  - Insight/Understanding of illness: good    Symptom Management:  -Pain: continue with duloxetine 60 mg daily for neuropathy upon discharge. [facility] will assume coverage of pain medications on discharge.    Summary of recommendations and follow up plan:  -Most important goals at this time: QOL, comfort   -Code status: Full Code    -Disposition: IP hospice then home with hospice

## 2025-06-19 NOTE — CONSULTS
06/19/2025    NEUROSURGERY CONSULT    CC:  Leg weakness    HPI:  Mr. Walton is a 69-year-old male with known widespread adenocarcinoma who has recently stopped chemotherapy within the past few weeks and then transitioned to palliative/hospice.  His known diffuse metastatic osseous disease.  He complains of his right chest port being infected and being painful and is asking to have it removed.  When he woke up yesterday morning he was unable to walk and had weakness with numbness and tingling in his legs.  He has no symptoms in his arms.  He is asking when he can eat.  He had to have his friends carry him from his house to the to be brought to the hospital.  He denies any changes in bowel or bladder function    Past Medical History:   Diagnosis Date    Bone cancer     DDD (degenerative disc disease), lumbosacral     Liver cancer     Low back pain     Lung cancer     Scleroderma         Past Surgical History:   Procedure Laterality Date    LITHOTRIPSY      LUNG BIOPSY         Current Medications[1]     Review of patient's allergies indicates:   Allergen Reactions    Milk containing products (dairy) Nausea And Vomiting     Per pt milked when used with cooking causes pt to vomit        PHYSICAL EXAM  Awake alert oriented x3 pupils were equal round reactive to light  Extraocular motions intact  Face symmetric  Tongue midline  Follows commands in bilateral upper extremities with full and symmetric strength  Right lower extremity with minimal movement  Left lower extremity he is able to plantar flex and dorsiflex 3/5 otherwise 0/5  He states he is numb and tingling throughout    Vitals:    06/18/25 2203   BP: 129/70   Pulse: 82   Resp: 18   Temp:            IMAGING:  CT scans of the thoracic and lumbar spine showed diffuse osseous metastatic disease at T2 there was a pathologic compression fracture    MRI scans of the thoracic and lumbar spine showed diffuse osseous metastatic disease at T2 where he has this compression  fracture there is severe cord compression    A/P:  69-year-old male with 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  He is no longer candidate for cancer directed therapy and is on palliation.  I do not think a decompression and fusion which would take him several months to recover from would align with the goal of comfort directed care in the setting of widely metastatic cancer with a longer undergoing any cancer directed therapy.  The patient expressed understanding and agreement.  He is asking if his port can still be removed as he has fasted long enough for surgery.  Recommend palliative/hospice care consultation.         [1] No current facility-administered medications for this encounter.    Current Outpatient Medications:     DULoxetine (CYMBALTA) 60 MG capsule, Take 1 capsule (60 mg total) by mouth once daily. for 120 doses, Disp: 30 capsule, Rfl: 3    amoxicillin (AMOXIL) 500 MG capsule, TAKE 1 CAPSULE BY MOUTH THREE TIMES DAILY UNTIL ALL TAKEN (Patient not taking: Reported on 6/18/2025), Disp: , Rfl:     benzonatate (TESSALON) 100 MG capsule, Take 1 capsule (100 mg total) by mouth 3 (three) times daily as needed for Cough., Disp: 90 capsule, Rfl: 2    chlorhexidine (PERIDEX) 0.12 % solution, RINSE MOUTH WITH 15 ML ONCE DAILY IN THE EVENING (Patient not taking: Reported on 6/18/2025), Disp: , Rfl:     cyanocobalamin 1,000 mcg/mL injection, Inject 1,000 mcg into the muscle every 30 days., Disp: , Rfl:     dexAMETHasone (DECADRON) 4 MG Tab, Take 1 tablet (4 mg total)  by mouth once daily., Disp: 60 tablet, Rfl: 0    diphenhydrAMINE (BENADRYL) 50 MG capsule, Take 50mg by mouth 1 hour before contrast administration., Disp: 1 capsule, Rfl: 0    diphenhydrAMINE-aluminum-magnesium hydroxide-simethicone-LIDOcaine viscous HCl 2%, Swish and spit 15 mLs every 4 (four) hours as needed., Disp: 1 each, Rfl: 2    fluticasone propionate (FLONASE) 50 mcg/actuation nasal spray, 1 spray by Each Nostril route once daily., Disp: , Rfl:     Lactobacillus rhamnosus GG (CULTURELLE) 10 billion cell capsule, Take 1 capsule by mouth every morning., Disp: , Rfl:     LIDOcaine (LIDODERM) 5 %, Place 1 patch onto the skin once daily. Remove & Discard patch within 12 hours or as directed by MD, Disp: 10 patch, Rfl: 0    LIDOcaine 5 % Crea, SMARTSIG:Sparingly Topical 3 Times Daily, Disp: , Rfl:     LIDOcaine viscous HCl 2% (XYLOCAINE) 2 % Soln, , Disp: , Rfl:     magic mouthwash diphen/antac/lidoc, Swish and spit 15 mLs every 4 (four) hours as needed., Disp: 450 mL, Rfl: 2    mupirocin (BACTROBAN) 2 % ointment, Apply 1 Application topically., Disp: , Rfl:     mv, min #36-iron,carbonyl-FA (GERITOL COMPLETE) 16 mg iron- 0.38 mg Tab, Take 1 tablet by mouth once daily., Disp: , Rfl:     nintedanib (OFEV) 100 mg Cap, Take 100 mg by mouth 2 (two) times daily. (Patient not taking: Reported on 6/18/2025), Disp: , Rfl:     omega 3-dha-epa-fish oil 1,000 mg (120 mg-180 mg) Cap, Take 1,000 mg by mouth., Disp: , Rfl:     ondansetron (ZOFRAN-ODT) 4 MG TbDL, Take 4 mg by mouth., Disp: , Rfl:     oxyCODONE-acetaminophen (PERCOCET)  mg per tablet, Take 1 tablet by mouth every 6 (six) hours as needed for Pain., Disp: 120 tablet, Rfl: 0    pantoprazole (PROTONIX) 40 MG tablet, Take 40 mg by mouth once daily., Disp: , Rfl:     phenazopyridine (PYRIDIUM) 200 MG tablet, Take 200 mg by mouth 3 (three) times daily as needed., Disp: , Rfl:     pregabalin (LYRICA) 75 MG capsule, Take 75 mg by mouth 2 (two) times daily. (Patient  not taking: Reported on 6/18/2025), Disp: , Rfl:     raloxifene (EVISTA) 60 mg tablet, Take 1 tablet by mouth once daily. (Patient not taking: Reported on 6/18/2025), Disp: , Rfl:     sildenafil (REVATIO) 20 mg Tab, Take 1 tablet by mouth 3 (three) times daily. (Patient not taking: Reported on 6/18/2025), Disp: , Rfl:     triamcinolone acetonide 0.1% (KENALOG) 0.1 % ointment, APPLY OINTMENT TOPICALLY TO AFFECTED AREA TWICE DAILY FOR 30 DAYS, FOR LEFT RING FINGER., Disp: , Rfl:

## 2025-06-24 LAB
BACTERIA BLD CULT: NORMAL
BACTERIA BLD CULT: NORMAL

## 2025-06-26 ENCOUNTER — TELEPHONE (OUTPATIENT)
Dept: RADIOLOGY | Facility: HOSPITAL | Age: 70
End: 2025-06-26
Payer: MEDICARE